# Patient Record
Sex: FEMALE | Race: WHITE | NOT HISPANIC OR LATINO | URBAN - METROPOLITAN AREA
[De-identification: names, ages, dates, MRNs, and addresses within clinical notes are randomized per-mention and may not be internally consistent; named-entity substitution may affect disease eponyms.]

---

## 2017-06-06 ENCOUNTER — INPATIENT (INPATIENT)
Facility: HOSPITAL | Age: 82
LOS: 1 days | Discharge: HOME | End: 2017-06-08
Attending: INTERNAL MEDICINE

## 2017-06-06 DIAGNOSIS — N39.0 URINARY TRACT INFECTION, SITE NOT SPECIFIED: ICD-10-CM

## 2017-06-28 DIAGNOSIS — N39.0 URINARY TRACT INFECTION, SITE NOT SPECIFIED: ICD-10-CM

## 2017-06-28 DIAGNOSIS — D50.9 IRON DEFICIENCY ANEMIA, UNSPECIFIED: ICD-10-CM

## 2017-06-28 DIAGNOSIS — C85.87 OTHER SPECIFIED TYPES OF NON-HODGKIN LYMPHOMA, SPLEEN: ICD-10-CM

## 2017-06-28 DIAGNOSIS — E86.0 DEHYDRATION: ICD-10-CM

## 2017-06-28 DIAGNOSIS — D70.3 NEUTROPENIA DUE TO INFECTION: ICD-10-CM

## 2019-07-03 ENCOUNTER — RECORD ABSTRACTING (OUTPATIENT)
Age: 84
End: 2019-07-03

## 2019-07-03 DIAGNOSIS — K63.5 POLYP OF COLON: ICD-10-CM

## 2019-07-03 DIAGNOSIS — Z80.3 FAMILY HISTORY OF MALIGNANT NEOPLASM OF BREAST: ICD-10-CM

## 2019-07-03 DIAGNOSIS — Z78.9 OTHER SPECIFIED HEALTH STATUS: ICD-10-CM

## 2019-07-03 DIAGNOSIS — Z86.2 PERSONAL HISTORY OF DISEASES OF THE BLOOD AND BLOOD-FORMING ORGANS AND CERTAIN DISORDERS INVOLVING THE IMMUNE MECHANISM: ICD-10-CM

## 2019-07-03 DIAGNOSIS — Z86.69 PERSONAL HISTORY OF OTHER DISEASES OF THE NERVOUS SYSTEM AND SENSE ORGANS: ICD-10-CM

## 2019-07-03 DIAGNOSIS — M25.473 EFFUSION, UNSPECIFIED ANKLE: ICD-10-CM

## 2019-07-03 DIAGNOSIS — N32.81 OVERACTIVE BLADDER: ICD-10-CM

## 2019-07-03 PROBLEM — Z00.00 ENCOUNTER FOR PREVENTIVE HEALTH EXAMINATION: Status: ACTIVE | Noted: 2019-07-03

## 2019-07-03 LAB — CYTOLOGY CVX/VAG DOC THIN PREP: NORMAL

## 2019-07-03 RX ORDER — DICYCLOMINE HYDROCHLORIDE 20 MG/1
TABLET ORAL
Refills: 0 | Status: ACTIVE | COMMUNITY

## 2019-07-03 RX ORDER — PREGABALIN 50 MG/1
50 CAPSULE ORAL
Refills: 0 | Status: ACTIVE | COMMUNITY

## 2019-07-08 ENCOUNTER — APPOINTMENT (OUTPATIENT)
Dept: OBGYN | Facility: CLINIC | Age: 84
End: 2019-07-08
Payer: MEDICARE

## 2019-07-08 VITALS
WEIGHT: 168 LBS | BODY MASS INDEX: 30.91 KG/M2 | HEIGHT: 62 IN | DIASTOLIC BLOOD PRESSURE: 74 MMHG | SYSTOLIC BLOOD PRESSURE: 128 MMHG

## 2019-07-08 DIAGNOSIS — N95.2 POSTMENOPAUSAL ATROPHIC VAGINITIS: ICD-10-CM

## 2019-07-08 DIAGNOSIS — N81.4 UTEROVAGINAL PROLAPSE, UNSPECIFIED: ICD-10-CM

## 2019-07-08 DIAGNOSIS — N39.3 STRESS INCONTINENCE (FEMALE) (MALE): ICD-10-CM

## 2019-07-08 PROCEDURE — 99214 OFFICE O/P EST MOD 30 MIN: CPT

## 2019-07-08 NOTE — PHYSICAL EXAM
[Awake] : awake [Alert] : alert [Soft] : soft [Oriented x3] : oriented to person, place, and time [Normal] : urethra [No Bleeding] : there was no active vaginal bleeding [Acute Distress] : no acute distress [Mass] : no breast mass [Nipple Discharge] : no nipple discharge [Axillary LAD] : no axillary lymphadenopathy [Tender] : non tender [Atrophy] : atrophy [Cystocele] : a cystocele [Absent] : was absent [Adnexa Absent] : absent bilaterally

## 2020-01-01 ENCOUNTER — EMERGENCY (EMERGENCY)
Facility: HOSPITAL | Age: 85
LOS: 0 days | Discharge: HOME | End: 2020-09-19
Attending: EMERGENCY MEDICINE | Admitting: EMERGENCY MEDICINE
Payer: MEDICARE

## 2020-01-01 VITALS
DIASTOLIC BLOOD PRESSURE: 84 MMHG | RESPIRATION RATE: 18 BRPM | SYSTOLIC BLOOD PRESSURE: 201 MMHG | HEIGHT: 63 IN | TEMPERATURE: 98 F | HEART RATE: 68 BPM | OXYGEN SATURATION: 98 %

## 2020-01-01 VITALS — HEART RATE: 94 BPM | DIASTOLIC BLOOD PRESSURE: 81 MMHG | SYSTOLIC BLOOD PRESSURE: 174 MMHG

## 2020-01-01 DIAGNOSIS — Z96.659 PRESENCE OF UNSPECIFIED ARTIFICIAL KNEE JOINT: Chronic | ICD-10-CM

## 2020-01-01 DIAGNOSIS — M79.89 OTHER SPECIFIED SOFT TISSUE DISORDERS: ICD-10-CM

## 2020-01-01 DIAGNOSIS — R60.0 LOCALIZED EDEMA: ICD-10-CM

## 2020-01-01 DIAGNOSIS — Z91.041 RADIOGRAPHIC DYE ALLERGY STATUS: ICD-10-CM

## 2020-01-01 DIAGNOSIS — Z88.0 ALLERGY STATUS TO PENICILLIN: ICD-10-CM

## 2020-01-01 LAB
ALBUMIN SERPL ELPH-MCNC: 3.8 G/DL — SIGNIFICANT CHANGE UP (ref 3.5–5.2)
ALP SERPL-CCNC: 61 U/L — SIGNIFICANT CHANGE UP (ref 30–115)
ALT FLD-CCNC: 36 U/L — SIGNIFICANT CHANGE UP (ref 0–41)
ANION GAP SERPL CALC-SCNC: 14 MMOL/L — SIGNIFICANT CHANGE UP (ref 7–14)
APPEARANCE UR: CLEAR — SIGNIFICANT CHANGE UP
AST SERPL-CCNC: 50 U/L — HIGH (ref 0–41)
BILIRUB SERPL-MCNC: 0.4 MG/DL — SIGNIFICANT CHANGE UP (ref 0.2–1.2)
BILIRUB UR-MCNC: NEGATIVE — SIGNIFICANT CHANGE UP
BUN SERPL-MCNC: 26 MG/DL — HIGH (ref 10–20)
CALCIUM SERPL-MCNC: 9.7 MG/DL — SIGNIFICANT CHANGE UP (ref 8.5–10.1)
CHLORIDE SERPL-SCNC: 100 MMOL/L — SIGNIFICANT CHANGE UP (ref 98–110)
CO2 SERPL-SCNC: 21 MMOL/L — SIGNIFICANT CHANGE UP (ref 17–32)
COLOR SPEC: SIGNIFICANT CHANGE UP
CREAT SERPL-MCNC: 0.9 MG/DL — SIGNIFICANT CHANGE UP (ref 0.7–1.5)
DIFF PNL FLD: NEGATIVE — SIGNIFICANT CHANGE UP
GLUCOSE SERPL-MCNC: 159 MG/DL — HIGH (ref 70–99)
GLUCOSE UR QL: NEGATIVE — SIGNIFICANT CHANGE UP
HCT VFR BLD CALC: 30.4 % — LOW (ref 37–47)
HGB BLD-MCNC: 9.6 G/DL — LOW (ref 12–16)
KETONES UR-MCNC: NEGATIVE — SIGNIFICANT CHANGE UP
LEUKOCYTE ESTERASE UR-ACNC: NEGATIVE — SIGNIFICANT CHANGE UP
MCHC RBC-ENTMCNC: 27.4 PG — SIGNIFICANT CHANGE UP (ref 27–31)
MCHC RBC-ENTMCNC: 31.6 G/DL — LOW (ref 32–37)
MCV RBC AUTO: 86.6 FL — SIGNIFICANT CHANGE UP (ref 81–99)
NITRITE UR-MCNC: NEGATIVE — SIGNIFICANT CHANGE UP
NRBC # BLD: 0 /100 WBCS — SIGNIFICANT CHANGE UP (ref 0–0)
NT-PROBNP SERPL-SCNC: 850 PG/ML — HIGH (ref 0–300)
PH UR: 6 — SIGNIFICANT CHANGE UP (ref 5–8)
PLATELET # BLD AUTO: 120 K/UL — LOW (ref 130–400)
POTASSIUM SERPL-MCNC: 5.6 MMOL/L — HIGH (ref 3.5–5)
POTASSIUM SERPL-SCNC: 5.6 MMOL/L — HIGH (ref 3.5–5)
PROT SERPL-MCNC: 6.2 G/DL — SIGNIFICANT CHANGE UP (ref 6–8)
PROT UR-MCNC: NEGATIVE — SIGNIFICANT CHANGE UP
RBC # BLD: 3.51 M/UL — LOW (ref 4.2–5.4)
RBC # FLD: 17.7 % — HIGH (ref 11.5–14.5)
SODIUM SERPL-SCNC: 135 MMOL/L — SIGNIFICANT CHANGE UP (ref 135–146)
SP GR SPEC: 1.01 — SIGNIFICANT CHANGE UP (ref 1.01–1.03)
UROBILINOGEN FLD QL: SIGNIFICANT CHANGE UP
WBC # BLD: 5.58 K/UL — SIGNIFICANT CHANGE UP (ref 4.8–10.8)
WBC # FLD AUTO: 5.58 K/UL — SIGNIFICANT CHANGE UP (ref 4.8–10.8)

## 2020-01-01 PROCEDURE — 93970 EXTREMITY STUDY: CPT | Mod: 26

## 2020-01-01 PROCEDURE — 71046 X-RAY EXAM CHEST 2 VIEWS: CPT | Mod: 26

## 2020-01-01 PROCEDURE — 99284 EMERGENCY DEPT VISIT MOD MDM: CPT

## 2020-06-22 ENCOUNTER — INPATIENT (INPATIENT)
Facility: HOSPITAL | Age: 85
LOS: 3 days | Discharge: HOME | End: 2020-06-26
Attending: INTERNAL MEDICINE | Admitting: INTERNAL MEDICINE
Payer: MEDICARE

## 2020-06-22 VITALS
RESPIRATION RATE: 18 BRPM | TEMPERATURE: 100 F | DIASTOLIC BLOOD PRESSURE: 58 MMHG | SYSTOLIC BLOOD PRESSURE: 133 MMHG | HEART RATE: 95 BPM

## 2020-06-22 LAB
ALBUMIN SERPL ELPH-MCNC: 4.1 G/DL — SIGNIFICANT CHANGE UP (ref 3.5–5.2)
ALLERGY+IMMUNOLOGY DIAG STUDY NOTE: SIGNIFICANT CHANGE UP
ALP SERPL-CCNC: 61 U/L — SIGNIFICANT CHANGE UP (ref 30–115)
ALT FLD-CCNC: 21 U/L — SIGNIFICANT CHANGE UP (ref 0–41)
ANION GAP SERPL CALC-SCNC: 15 MMOL/L — HIGH (ref 7–14)
ANISOCYTOSIS BLD QL: SLIGHT — SIGNIFICANT CHANGE UP
APPEARANCE UR: CLEAR — SIGNIFICANT CHANGE UP
AST SERPL-CCNC: 27 U/L — SIGNIFICANT CHANGE UP (ref 0–41)
BACTERIA # UR AUTO: NEGATIVE — SIGNIFICANT CHANGE UP
BASE EXCESS BLDV CALC-SCNC: 1.2 MMOL/L — SIGNIFICANT CHANGE UP (ref -2–2)
BASOPHILS # BLD AUTO: 0 K/UL — SIGNIFICANT CHANGE UP (ref 0–0.2)
BASOPHILS NFR BLD AUTO: 0 % — SIGNIFICANT CHANGE UP (ref 0–1)
BILIRUB DIRECT SERPL-MCNC: <0.2 MG/DL — SIGNIFICANT CHANGE UP (ref 0–0.2)
BILIRUB INDIRECT FLD-MCNC: >0.2 MG/DL — SIGNIFICANT CHANGE UP (ref 0.2–1.2)
BILIRUB SERPL-MCNC: 0.4 MG/DL — SIGNIFICANT CHANGE UP (ref 0.2–1.2)
BILIRUB UR-MCNC: NEGATIVE — SIGNIFICANT CHANGE UP
BLD GP AB SCN SERPL QL: SIGNIFICANT CHANGE UP
BUN SERPL-MCNC: 21 MG/DL — HIGH (ref 10–20)
CA-I SERPL-SCNC: 1.24 MMOL/L — SIGNIFICANT CHANGE UP (ref 1.12–1.3)
CALCIUM SERPL-MCNC: 9.3 MG/DL — SIGNIFICANT CHANGE UP (ref 8.5–10.1)
CHLORIDE SERPL-SCNC: 99 MMOL/L — SIGNIFICANT CHANGE UP (ref 98–110)
CO2 SERPL-SCNC: 23 MMOL/L — SIGNIFICANT CHANGE UP (ref 17–32)
COLOR SPEC: YELLOW — SIGNIFICANT CHANGE UP
CREAT SERPL-MCNC: 1.2 MG/DL — SIGNIFICANT CHANGE UP (ref 0.7–1.5)
DIFF PNL FLD: NEGATIVE — SIGNIFICANT CHANGE UP
DIR ANTIGLOB POLYSPECIFIC INTERPRETATION: SIGNIFICANT CHANGE UP
EOSINOPHIL # BLD AUTO: 0 K/UL — SIGNIFICANT CHANGE UP (ref 0–0.7)
EOSINOPHIL NFR BLD AUTO: 0 % — SIGNIFICANT CHANGE UP (ref 0–8)
EPI CELLS # UR: 3 /HPF — SIGNIFICANT CHANGE UP (ref 0–5)
GAS PNL BLDV: 137 MMOL/L — SIGNIFICANT CHANGE UP (ref 136–145)
GAS PNL BLDV: SIGNIFICANT CHANGE UP
GIANT PLATELETS BLD QL SMEAR: PRESENT — SIGNIFICANT CHANGE UP
GLUCOSE SERPL-MCNC: 139 MG/DL — HIGH (ref 70–99)
GLUCOSE UR QL: NEGATIVE — SIGNIFICANT CHANGE UP
HCO3 BLDV-SCNC: 26 MMOL/L — SIGNIFICANT CHANGE UP (ref 22–29)
HCT VFR BLD CALC: 28.8 % — LOW (ref 37–47)
HCT VFR BLDA CALC: 29 % — LOW (ref 34–44)
HGB BLD CALC-MCNC: 9.5 G/DL — LOW (ref 14–18)
HGB BLD-MCNC: 9 G/DL — LOW (ref 12–16)
HYALINE CASTS # UR AUTO: 1 /LPF — SIGNIFICANT CHANGE UP (ref 0–7)
HYPOCHROMIA BLD QL: SLIGHT — SIGNIFICANT CHANGE UP
INR BLD: 1.04 RATIO — SIGNIFICANT CHANGE UP (ref 0.65–1.3)
KETONES UR-MCNC: NEGATIVE — SIGNIFICANT CHANGE UP
LACTATE BLDV-MCNC: 2.4 MMOL/L — HIGH (ref 0.5–1.6)
LACTATE SERPL-SCNC: 1.7 MMOL/L — SIGNIFICANT CHANGE UP (ref 0.7–2)
LEUKOCYTE ESTERASE UR-ACNC: ABNORMAL
LIDOCAIN IGE QN: 40 U/L — SIGNIFICANT CHANGE UP (ref 7–60)
LYMPHOCYTES # BLD AUTO: 0.18 K/UL — LOW (ref 1.2–3.4)
LYMPHOCYTES # BLD AUTO: 7 % — LOW (ref 20.5–51.1)
MANUAL SMEAR VERIFICATION: SIGNIFICANT CHANGE UP
MCHC RBC-ENTMCNC: 27 PG — SIGNIFICANT CHANGE UP (ref 27–31)
MCHC RBC-ENTMCNC: 31.3 G/DL — LOW (ref 32–37)
MCV RBC AUTO: 86.5 FL — SIGNIFICANT CHANGE UP (ref 81–99)
MICROCYTES BLD QL: SLIGHT — SIGNIFICANT CHANGE UP
MONOCYTES # BLD AUTO: 0.09 K/UL — LOW (ref 0.1–0.6)
MONOCYTES NFR BLD AUTO: 3.5 % — SIGNIFICANT CHANGE UP (ref 1.7–9.3)
NEUTROPHILS # BLD AUTO: 2.35 K/UL — SIGNIFICANT CHANGE UP (ref 1.4–6.5)
NEUTROPHILS NFR BLD AUTO: 79.1 % — HIGH (ref 42.2–75.2)
NEUTS BAND # BLD: 10.4 % — HIGH (ref 0–6)
NITRITE UR-MCNC: NEGATIVE — SIGNIFICANT CHANGE UP
PCO2 BLDV: 44 MMHG — SIGNIFICANT CHANGE UP (ref 41–51)
PH BLDV: 7.39 — SIGNIFICANT CHANGE UP (ref 7.26–7.43)
PH UR: 6 — SIGNIFICANT CHANGE UP (ref 5–8)
PLAT MORPH BLD: NORMAL — SIGNIFICANT CHANGE UP
PLATELET # BLD AUTO: 22 K/UL — LOW (ref 130–400)
PO2 BLDV: 26 MMHG — SIGNIFICANT CHANGE UP (ref 20–40)
POIKILOCYTOSIS BLD QL AUTO: SLIGHT — SIGNIFICANT CHANGE UP
POLYCHROMASIA BLD QL SMEAR: SLIGHT — SIGNIFICANT CHANGE UP
POTASSIUM BLDV-SCNC: 4.2 MMOL/L — SIGNIFICANT CHANGE UP (ref 3.3–5.6)
POTASSIUM SERPL-MCNC: 4.3 MMOL/L — SIGNIFICANT CHANGE UP (ref 3.5–5)
POTASSIUM SERPL-SCNC: 4.3 MMOL/L — SIGNIFICANT CHANGE UP (ref 3.5–5)
PROT SERPL-MCNC: 5.9 G/DL — LOW (ref 6–8)
PROT UR-MCNC: SIGNIFICANT CHANGE UP
PROTHROM AB SERPL-ACNC: 12 SEC — SIGNIFICANT CHANGE UP (ref 9.95–12.87)
RBC # BLD: 3.33 M/UL — LOW (ref 4.2–5.4)
RBC # FLD: 15.7 % — HIGH (ref 11.5–14.5)
RBC BLD AUTO: ABNORMAL
RBC CASTS # UR COMP ASSIST: 3 /HPF — SIGNIFICANT CHANGE UP (ref 0–4)
SAO2 % BLDV: 45 % — SIGNIFICANT CHANGE UP
SODIUM SERPL-SCNC: 137 MMOL/L — SIGNIFICANT CHANGE UP (ref 135–146)
SP GR SPEC: 1.02 — SIGNIFICANT CHANGE UP (ref 1.01–1.02)
UROBILINOGEN FLD QL: SIGNIFICANT CHANGE UP
WBC # BLD: 2.63 K/UL — LOW (ref 4.8–10.8)
WBC # FLD AUTO: 2.63 K/UL — LOW (ref 4.8–10.8)
WBC UR QL: 11 /HPF — HIGH (ref 0–5)

## 2020-06-22 PROCEDURE — 99285 EMERGENCY DEPT VISIT HI MDM: CPT | Mod: CS

## 2020-06-22 PROCEDURE — 71045 X-RAY EXAM CHEST 1 VIEW: CPT | Mod: 26

## 2020-06-22 RX ORDER — SODIUM CHLORIDE 9 MG/ML
2150 INJECTION INTRAMUSCULAR; INTRAVENOUS; SUBCUTANEOUS ONCE
Refills: 0 | Status: COMPLETED | OUTPATIENT
Start: 2020-06-22 | End: 2020-06-22

## 2020-06-22 RX ORDER — ACETAMINOPHEN 500 MG
650 TABLET ORAL ONCE
Refills: 0 | Status: COMPLETED | OUTPATIENT
Start: 2020-06-22 | End: 2020-06-22

## 2020-06-22 RX ORDER — MEROPENEM 1 G/30ML
1000 INJECTION INTRAVENOUS ONCE
Refills: 0 | Status: COMPLETED | OUTPATIENT
Start: 2020-06-22 | End: 2020-06-22

## 2020-06-22 RX ADMIN — MEROPENEM 100 MILLIGRAM(S): 1 INJECTION INTRAVENOUS at 20:49

## 2020-06-22 RX ADMIN — SODIUM CHLORIDE 1075 MILLILITER(S): 9 INJECTION INTRAMUSCULAR; INTRAVENOUS; SUBCUTANEOUS at 19:37

## 2020-06-22 NOTE — H&P ADULT - NSHPPHYSICALEXAM_GEN_ALL_CORE
PHYSICAL EXAM:  GENERAL: NAD, lying in bed comfortably  HEAD:  Atraumatic, Normocephalic  EYES: EOMI, PERRLA, conjunctiva and sclera clear  ENT: Moist mucous membranes  NECK: Supple, No JVD  CHEST/LUNG: Clear to auscultation bilaterally; No rales, rhonchi, wheezing, or rubs. Unlabored respirations  HEART: Regular rate and rhythm; No murmurs, rubs, or gallops  ABDOMEN: Bowel sounds present; Soft, Nontender, Nondistended. No hepatomegally  EXTREMITIES:  2+ Peripheral Pulses, brisk capillary refill. No clubbing, cyanosis, or edema  NERVOUS SYSTEM:  Alert & Oriented X3, speech clear. No deficits   MSK: FROM all 4 extremities, full and equal strength  SKIN: No rashes or lesions PHYSICAL EXAM:  GENERAL: NAD, lying in bed comfortably  HEAD:  Atraumatic, Normocephalic  EYES: EOMI, PERRLA, conjunctiva and sclera clear  ENT: Moist mucous membranes  NECK: Supple, No JVD  CHEST/LUNG: Clear to auscultation bilaterally; No rales, rhonchi, wheezing, or rubs. Unlabored respirations  HEART: Regular rate and rhythm; No murmurs, rubs, or gallops  ABDOMEN: Bowel sounds present; Soft, Nontender, Nondistended. No hepatomegally  EXTREMITIES:  2+ Peripheral Pulses, brisk capillary refill. No clubbing, cyanosis, or edema  NERVOUS SYSTEM:  Alert & Oriented X3, speech clear. No deficits   MSK: FROM all 4 extremities, full and equal strength  SKIN: diffuse upper and lower extremity bruising.

## 2020-06-22 NOTE — H&P ADULT - NSHPLABSRESULTS_GEN_ALL_CORE
9.0    2.63  )-----------( 22       ( 22 Jun 2020 18:26 )             28.8       06-22    137  |  99  |  21<H>  ----------------------------<  139<H>  4.3   |  23  |  1.2    Ca    9.3      22 Jun 2020 18:26    TPro  5.9<L>  /  Alb  4.1  /  TBili  0.4  /  DBili  <0.2  /  AST  27  /  ALT  21  /  AlkPhos  61  06-22

## 2020-06-22 NOTE — ED PROVIDER NOTE - ATTENDING CONTRIBUTION TO CARE
89 y/o female h/o lymphoma on active chemo, denies PSH, non-smoker, now presents with urinary frequency x week (on po bactrim x 1 day) and fever Tm (103.9, oral) today, sx are intermittent, denies modifying factors, associated nbnb vomiting and chills / rigors, denies headache, ear pain, sore throat, stuffy / runny nose, neck pain, cough, abdominal pain, diarrhea, anorexia, respiratory sx, change in bowel habits, rash or other associated complaints at present.    No old chart available for review.  I have reviewed and agree with the nursing note, except as documented in my note.    VSS, awake, alert, non-toxic appearing, lying comfortably in stretcher, in NAD, oropharynx clear, mmm, no JVD or bruit, no skin rash or lesions, chest CTAB, non-labored breathing, no w/r/r, +S1/S2, RRR, no m/r/g, abdomen soft, NT, ND, +BS, no peripheral edema or deformities, alert, clear speech.

## 2020-06-22 NOTE — H&P ADULT - ATTENDING COMMENTS
HPI:  89 year old female with a PMhx of diffuse large cell lymphoma, ITP, cervical disk inflammation.   Patient is on active chemotherapy for lymphoma. Her last session was one week prior at [facility] where she is getting Rituximab.   She has received 1 dose of rituximab for DLCL (1 week ago) and was scheduled for her second dose on 6/23.     CC: fever of 103 at home and chills    History goes back to: 1 day prior to admission when she started having chills and high grade fever.   She was at a barbecue with her relatives when she suddenly experienced these symptoms.   No exposure to COVID-19.      ROS is positive for: chronic urinary frequency    ROS is negative for: She reports no dysuria, no cough, no shortness of breath, no GI symptoms, no abdominal pain, no decrease PO intake.    Of note, patient had a UTI a few weeks ago and she completed a course of bactrim.     In the ED: Patient was hemodynamically stable, Ofdu=286.1  WBC=2, and plts=22  UA --> mildly positive   CXR: no signs of acute cardiopulmonary disease (unofficial reading)  Patient got 1 dose of meropenem in the ED. (22 Jun 2020 22:52)    REVIEW OF SYSTEMS: see cc/HPI  CONSTITUTIONAL: No weakness, fevers or chills  EYES/ENT: No visual changes;  No vertigo or throat pain   NECK: No pain or stiffness  RESPIRATORY: No cough, wheezing, hemoptysis; No shortness of breath  CARDIOVASCULAR: No chest pain or palpitations  GASTROINTESTINAL: No abdominal or epigastric pain. No nausea, vomiting, or hematemesis; No diarrhea or constipation. No melena or hematochezia.  GENITOURINARY: No dysuria, frequency or hematuria  NEUROLOGICAL: No numbness or weakness  SKIN: No itching, rashes    Physical Exam:  General: WN/WD NAD  Neurology: A&Ox3, nonfocal, follows commands  Eyes: PERRLA/ EOMI  ENT/Neck: Neck supple, trachea midline, No JVD  Respiratory: CTA B/L, No wheezing, rales, rhonchi  CV: Normal rate regular rhythm, S1S2, no murmurs, rubs or gallops  Abdominal: Soft, NT, ND +BS,   Extremities: No edema, + peripheral pulses  Skin: No Rashes, Hematoma, Ecchymosis  Incisions: n/a  Tubes: n/a    A/p  UTI- symptomatic / Fever and chills - PUI   -IV Abx   -check blood and Ucx   -Check COVID-19 PCR    DLCL / Pancytopenia - on active chemo   -monitor CBC and monitor for signs of bleeding   -c/w Acyclovir     KATHIA - stable  -IV fluids x 24 and repeat BMP  SCD to LE bilaterally for DVT prophylaxis

## 2020-06-22 NOTE — ED ADULT NURSE NOTE - CHIEF COMPLAINT QUOTE
pt. complaining of shakes and vomiting after taking medications. pt. had first chemo therapy last Tuesday due to lymphoma. pt. stated that her temperature was 103 F hour prior to arrival at ED.

## 2020-06-22 NOTE — H&P ADULT - NSHPREVIEWOFSYSTEMS_GEN_ALL_CORE
As in HPI REVIEW OF SYSTEMS:    CONSTITUTIONAL: FEVER and CHILLS  EYES/ENT: No visual changes;  No vertigo or throat pain   NECK: No pain or stiffness  RESPIRATORY: No cough, wheezing, hemoptysis; No shortness of breath  CARDIOVASCULAR: No chest pain or palpitations  GASTROINTESTINAL: No abdominal or epigastric pain. No nausea, vomiting, or hematemesis; No diarrhea or constipation. No melena or hematochezia.  GENITOURINARY: chronic urinary frequency.   NEUROLOGICAL: No numbness or weakness  SKIN: chronic easy bruising.

## 2020-06-22 NOTE — ED PROVIDER NOTE - CARE PLAN
Principal Discharge DX:	UTI (urinary tract infection)  Secondary Diagnosis:	Sepsis  Secondary Diagnosis:	Thrombocytopenia

## 2020-06-22 NOTE — H&P ADULT - ASSESSMENT
89 year old female    #      # Anemia and thrombocytopenia  Likely chemotherapy induced. Patient has been taking steroids for her thrombocytopenia 89 year old female with a PMhx of diffuse large cell lymphoma (currently on rituximab last session 1 week ago, next due on 6/23/2) , ITP, cervical disk inflammation.   Patient is presenting for high grade fevers and chills at home  In the ED, Rydi=945.1, hemodynamically stable and AAOx3  Patient is admitted for further evaluation of fever in the setting of immunosuppression     # Fever + chills at home (no fever documented yet in ED). No sepsis  Most likely UTI. UA is mildly positive, no dysuria  (however patient has chronic urinary frequency which could mask her symptoms) Completed a course of bactrim 2 weeks ago for UTI  Less likely covid (no symptoms and leukopenia could be due to chemotherapy)  CXR normal   - will start patient on ceftriaxone for uncomplicated UTI (aware of penicillin allergy - rash)  - follow up Ucx and Bcx  - f/u covid swab  - monitor for fevers    # Pancytopenia -  Pancytopenia likely from rituximab   # Thrombocytopenia, likely multifactorial in the setting of Hx of ITP -  Patient unsure when the last time her plts were normal.   Possible element of bactrim induced thrombocytopenia  She had previously completed a course of steroids from her PCP (Dr. Aj) for possible ITP  - no active signs of bleeding. Monitor for bleeding  - hold DVT prophylaxis  - outpatient follow up     # DLCL - on rituximab  2nd dose was due on 6/23/20  - continue acylovir prophylaxis  - continue folic acid    #Misc  - DVT Prophylaxis: SCDs in the setting of thrombocytopenia,  - Diet: regular  - Activity: as tolerated  - Code Status: Full Code    SOCIAL: patient lives alone, walks with a cane, comfortable caring out self care.    * MED REC COMPLETED WITH PATIENT*    Oncologist: Dr. Michael Hernandez - [facility]  PCP: Dr. Lui Aj. 89 year old female with a PMhx of diffuse large cell lymphoma (currently on rituximab last session 1 week ago, next due on 6/23/2) , ITP, cervical disk inflammation.   Patient is presenting for high grade fevers and chills at home  In the ED, Hwrs=941.1, hemodynamically stable and AAOx3  Patient is admitted for further evaluation and management of fever in the setting of immunosuppression     # Fever + chills at home (no fever documented yet in ED) in an immunocompromised patient. No sepsis  Most likely UTI. UA is mildly positive, no dysuria  (however patient has chronic urinary frequency which could mask her symptoms). Additionally, patient completed a course of bactrim 2 weeks ago for UTI.  Less likely covid PNA (no symptoms, no suggestive CXR findings and leukopenia could be due to Rituximab)  CXR normal   - will start patient on ceftriaxone for uncomplicated UTI (aware of penicillin allergy - rash)  - follow up Ucx and Bcx  - f/u covid swab  - monitor for fevers    # Pancytopenia -  Pancytopenia likely from rituximab   # Thrombocytopenia, likely multifactorial in the setting of Hx of ITP, rituximab -  Patient unsure when the last time her plts were normal.   Possible element of bactrim induced thrombocytopenia  She had previously completed a course of steroids from her PCP (Dr. Aj) for possible ITP  - no active signs of bleeding. Monitor for bleeding  - hold DVT prophylaxis  - outpatient follow up     # Possible KATHIA? Cr=1.2  No baseline, but no known hx of kidney disease  s/p 1L LR in ED  - encourage PO intake  - trend cr    # DLCL - on rituximab  2nd dose was due on 6/23/20  - continue acyclovir prophylaxis for VZV/HSV in the setting of immunosuppression  - continue folic acid    #Misc  - DVT Prophylaxis: SCDs in the setting of thrombocytopenia,  - Diet: regular  - Activity: as tolerated  - Code Status: Full Code    SOCIAL: patient lives alone, walks with a cane, comfortable with daily personal care.   * MED REC COMPLETED WITH PATIENT*    Oncologist: Dr. Michael Hernandez - [facility]  PCP: Dr. Lui Aj. 89 year old female with a PMhx of diffuse large cell lymphoma (currently on rituximab last session 1 week ago, next due on 6/23/2) , ITP, cervical disk inflammation.   Patient is presenting for high grade fevers and chills at home  In the ED, Svnw=143.1, hemodynamically stable and AAOx3  Patient is admitted for further evaluation and management of fever in the setting of immunosuppression     # Fever + chills at home (no fever documented yet in ED) in an immunocompromised patient. No sepsis  Most likely UTI. UA is mildly positive, no dysuria  (however patient has chronic urinary frequency which could mask her symptoms). Additionally, patient completed a course of bactrim 2 weeks ago for UTI.  Less likely covid PNA/other viral URTI (no symptoms, no suggestive CXR findings and leukopenia could be due to Rituximab)  CXR normal   - will start patient on ceftriaxone for uncomplicated UTI (aware of penicillin allergy - rash)  - follow up Ucx and Bcx  - f/u covid swab  - monitor for fevers    # Pancytopenia -  Pancytopenia likely from rituximab   # Thrombocytopenia, likely multifactorial in the setting of Hx of ITP, rituximab -  Patient unsure when the last time her plts were normal.   Possible element of bactrim induced thrombocytopenia  She had previously completed a course of steroids from her PCP (Dr. Aj) for possible ITP  - no active signs of bleeding. Monitor for bleeding  - hold DVT prophylaxis  - outpatient follow up     # Possible KATHIA? Cr=1.2  No baseline, but no known hx of kidney disease  s/p 1L LR in ED  - encourage PO intake  - trend cr    # DLCL - on rituximab  2nd dose was due on 6/23/20  - continue acyclovir prophylaxis for VZV/HSV in the setting of immunosuppression  - continue folic acid    #Misc  - DVT Prophylaxis: SCDs in the setting of thrombocytopenia,  - Diet: regular  - Activity: as tolerated  - Code Status: Full Code    SOCIAL: patient lives alone, walks with a cane, comfortable with daily personal care.   * MED REC COMPLETED WITH PATIENT*    Oncologist: Dr. Michael Hernandez - [facility]  PCP: Dr. Lui Aj.

## 2020-06-22 NOTE — ED PROVIDER NOTE - NS ED ROS FT
CONST: No fever, chills or bodyaches  EYES: No pain, redness, drainage or visual changes.  ENT: No ear pain or discharge, nasal discharge or congestion. No sore throat  CARD: No chest pain, palpitations  RESP: No SOB, cough  GI: No abdominal pain, N/V/D  : Urinary frequency, no dysuria.  MS: No joint pain, back pain or extremity pain/injury  SKIN: No rashes  NEURO: No headache, dizziness, paresthesias or LOC

## 2020-06-22 NOTE — ED PROVIDER NOTE - OBJECTIVE STATEMENT
89yo female with PMHx overactive bladder, lymphoma on active chemo, with last session one week prior at [facility] and due for today, however canceled 2/2 low platelets. Was started on prednisone by her PMD in consultation with her heme/onc.  Pt reports she has had urinary frequency for one week and was recently started on Bactrim by her oncologist of which she took one dose today and shortly after vomited. Pt notes rigors and tmax 103 at home, responsive to tylenol. Denies abdominal or flank pain. No SOB,CP. Pt has been in close contact with multiple visiting family members and has traveled to MD appts in [facility] and [facility].

## 2020-06-22 NOTE — H&P ADULT - HISTORY OF PRESENT ILLNESS
89 year old female with a PMhx of lymphoma, overactive bladder.   Patient is on active chemotherapy for lymphoma. Her last session was one week prior at [facility]     CC:    History goes back to:       Of note      ROS is positive for:     ROS is negative for:     In the ED: Patient was hemodynamically stable, Odbs=298.1  UA --> mildly positive   CXR: no signs of acute cardiopulmonary disease unofficial reading) 89 year old female with a PMhx of diffuse large cell lymphoma, ITP, cervical disk inflammation.   Patient is on active chemotherapy for lymphoma. Her last session was one week prior at [facility] where she is getting Rituximab.   She was received 1 dose of rituximab for DLCL (1 week ago) and was scheduled for her second dose on 6/23.     CC: fever of 103 at home and chills    History goes back to: 1 day prior to admission when she started having chills and high grade fever.   She was at a barbecue with her relatives when she suddenly experienced these symptoms.   No exposure to COVID-19.      ROS is positive for: chronic urinary frequency    ROS is negative for: She reports no dysuria, no cough, no shortness of breath, no GI symptoms, no abdominal pain, no decrease PO intake.    Of note, patient had a UTI a few weeks ago and she completed a course of bactrim.     In the ED: Patient was hemodynamically stable, Hhux=873.1  WBC=2, and plts=22  UA --> mildly positive   CXR: no signs of acute cardiopulmonary disease (unofficial reading)  Patient got 1 dose of meropenem in the ED. 89 year old female with a PMhx of diffuse large cell lymphoma, ITP, cervical disk inflammation.   Patient is on active chemotherapy for lymphoma. Her last session was one week prior at [facility] where she is getting Rituximab.   She has received 1 dose of rituximab for DLCL (1 week ago) and was scheduled for her second dose on 6/23.     CC: fever of 103 at home and chills    History goes back to: 1 day prior to admission when she started having chills and high grade fever.   She was at a barbecue with her relatives when she suddenly experienced these symptoms.   No exposure to COVID-19.      ROS is positive for: chronic urinary frequency    ROS is negative for: She reports no dysuria, no cough, no shortness of breath, no GI symptoms, no abdominal pain, no decrease PO intake.    Of note, patient had a UTI a few weeks ago and she completed a course of bactrim.     In the ED: Patient was hemodynamically stable, Lqfm=677.1  WBC=2, and plts=22  UA --> mildly positive   CXR: no signs of acute cardiopulmonary disease (unofficial reading)  Patient got 1 dose of meropenem in the ED.

## 2020-06-22 NOTE — ED PROVIDER NOTE - PHYSICAL EXAMINATION
CONST: Well appearing in NAD  EYES: PERRL, EOMI, Sclera and conjunctiva clear.   ENT: No nasal discharge.   NECK: Non-tender  CARD: Normal S1 S2; Normal rate and rhythm  RESP: Equal BS B/L, No wheezes, rhonchi or rales. No distress  GI: Soft, non-tender, non-distended.  MS: Normal ROM in all extremities. No midline spinal tenderness.  SKIN: Warm, dry, no acute rashes. Good turgor. Scattered ecchymosis to UEs  NEURO: A&Ox3, No focal deficits. Strength 5/5 with no sensory deficits. Steady gait

## 2020-06-22 NOTE — ED ADULT NURSE NOTE - OBJECTIVE STATEMENT
pt. complaining of shakes and fever after taking medications. pt. had first chemotherapy last Tuesday. pt. complaining of shakes and fever after taking medications. pt. had an episode of bloody stool and ,UTI symptom. pt. had first chemotherapy last Tuesday.

## 2020-06-23 DIAGNOSIS — Z96.659 PRESENCE OF UNSPECIFIED ARTIFICIAL KNEE JOINT: Chronic | ICD-10-CM

## 2020-06-23 LAB
ALBUMIN SERPL ELPH-MCNC: 3.5 G/DL — SIGNIFICANT CHANGE UP (ref 3.5–5.2)
ALP SERPL-CCNC: 54 U/L — SIGNIFICANT CHANGE UP (ref 30–115)
ALT FLD-CCNC: 18 U/L — SIGNIFICANT CHANGE UP (ref 0–41)
ANION GAP SERPL CALC-SCNC: 12 MMOL/L — SIGNIFICANT CHANGE UP (ref 7–14)
APTT BLD: SIGNIFICANT CHANGE UP SEC (ref 27–39.2)
AST SERPL-CCNC: 26 U/L — SIGNIFICANT CHANGE UP (ref 0–41)
BASOPHILS # BLD AUTO: 0 K/UL — SIGNIFICANT CHANGE UP (ref 0–0.2)
BASOPHILS NFR BLD AUTO: 0 % — SIGNIFICANT CHANGE UP (ref 0–1)
BILIRUB SERPL-MCNC: 0.4 MG/DL — SIGNIFICANT CHANGE UP (ref 0.2–1.2)
BUN SERPL-MCNC: 17 MG/DL — SIGNIFICANT CHANGE UP (ref 10–20)
CALCIUM SERPL-MCNC: 8.3 MG/DL — LOW (ref 8.5–10.1)
CHLORIDE SERPL-SCNC: 105 MMOL/L — SIGNIFICANT CHANGE UP (ref 98–110)
CO2 SERPL-SCNC: 23 MMOL/L — SIGNIFICANT CHANGE UP (ref 17–32)
CREAT SERPL-MCNC: 1 MG/DL — SIGNIFICANT CHANGE UP (ref 0.7–1.5)
CULTURE RESULTS: SIGNIFICANT CHANGE UP
EOSINOPHIL # BLD AUTO: 0.04 K/UL — SIGNIFICANT CHANGE UP (ref 0–0.7)
EOSINOPHIL NFR BLD AUTO: 2.1 % — SIGNIFICANT CHANGE UP (ref 0–8)
GLUCOSE SERPL-MCNC: 98 MG/DL — SIGNIFICANT CHANGE UP (ref 70–99)
HCT VFR BLD CALC: 26.9 % — LOW (ref 37–47)
HGB BLD-MCNC: 8.8 G/DL — LOW (ref 12–16)
IMM GRANULOCYTES NFR BLD AUTO: 7.8 % — HIGH (ref 0.1–0.3)
INR BLD: 0.76 RATIO — SIGNIFICANT CHANGE UP (ref 0.65–1.3)
LYMPHOCYTES # BLD AUTO: 0.6 K/UL — LOW (ref 1.2–3.4)
LYMPHOCYTES # BLD AUTO: 31.3 % — SIGNIFICANT CHANGE UP (ref 20.5–51.1)
MCHC RBC-ENTMCNC: 28.9 PG — SIGNIFICANT CHANGE UP (ref 27–31)
MCHC RBC-ENTMCNC: 32.7 G/DL — SIGNIFICANT CHANGE UP (ref 32–37)
MCV RBC AUTO: 88.5 FL — SIGNIFICANT CHANGE UP (ref 81–99)
MONOCYTES # BLD AUTO: 0.2 K/UL — SIGNIFICANT CHANGE UP (ref 0.1–0.6)
MONOCYTES NFR BLD AUTO: 10.4 % — HIGH (ref 1.7–9.3)
NEUTROPHILS # BLD AUTO: 0.93 K/UL — LOW (ref 1.4–6.5)
NEUTROPHILS NFR BLD AUTO: 48.4 % — SIGNIFICANT CHANGE UP (ref 42.2–75.2)
NRBC # BLD: 0 /100 WBCS — SIGNIFICANT CHANGE UP (ref 0–0)
PLATELET # BLD AUTO: 17 K/UL — CRITICAL LOW (ref 130–400)
POTASSIUM SERPL-MCNC: 4.3 MMOL/L — SIGNIFICANT CHANGE UP (ref 3.5–5)
POTASSIUM SERPL-SCNC: 4.3 MMOL/L — SIGNIFICANT CHANGE UP (ref 3.5–5)
PROT SERPL-MCNC: 5.1 G/DL — LOW (ref 6–8)
PROTHROM AB SERPL-ACNC: 8.7 SEC — LOW (ref 9.95–12.87)
RBC # BLD: 3.04 M/UL — LOW (ref 4.2–5.4)
RBC # FLD: 15.9 % — HIGH (ref 11.5–14.5)
SARS-COV-2 RNA SPEC QL NAA+PROBE: SIGNIFICANT CHANGE UP
SODIUM SERPL-SCNC: 140 MMOL/L — SIGNIFICANT CHANGE UP (ref 135–146)
SPECIMEN SOURCE: SIGNIFICANT CHANGE UP
WBC # BLD: 1.92 K/UL — LOW (ref 4.8–10.8)
WBC # FLD AUTO: 1.92 K/UL — LOW (ref 4.8–10.8)

## 2020-06-23 PROCEDURE — 86077 PHYS BLOOD BANK SERV XMATCH: CPT

## 2020-06-23 PROCEDURE — 99223 1ST HOSP IP/OBS HIGH 75: CPT

## 2020-06-23 RX ORDER — ACETAMINOPHEN 500 MG
650 TABLET ORAL ONCE
Refills: 0 | Status: COMPLETED | OUTPATIENT
Start: 2020-06-23 | End: 2020-06-23

## 2020-06-23 RX ORDER — FOLIC ACID 0.8 MG
1 TABLET ORAL DAILY
Refills: 0 | Status: DISCONTINUED | OUTPATIENT
Start: 2020-06-23 | End: 2020-06-26

## 2020-06-23 RX ORDER — LATANOPROST 0.05 MG/ML
1 SOLUTION/ DROPS OPHTHALMIC; TOPICAL AT BEDTIME
Refills: 0 | Status: DISCONTINUED | OUTPATIENT
Start: 2020-06-23 | End: 2020-06-26

## 2020-06-23 RX ORDER — CEFEPIME 1 G/1
2000 INJECTION, POWDER, FOR SOLUTION INTRAMUSCULAR; INTRAVENOUS EVERY 12 HOURS
Refills: 0 | Status: DISCONTINUED | OUTPATIENT
Start: 2020-06-23 | End: 2020-06-24

## 2020-06-23 RX ORDER — CEFTRIAXONE 500 MG/1
1000 INJECTION, POWDER, FOR SOLUTION INTRAMUSCULAR; INTRAVENOUS EVERY 24 HOURS
Refills: 0 | Status: DISCONTINUED | OUTPATIENT
Start: 2020-06-23 | End: 2020-06-23

## 2020-06-23 RX ORDER — ACYCLOVIR SODIUM 500 MG
400 VIAL (EA) INTRAVENOUS
Refills: 0 | Status: DISCONTINUED | OUTPATIENT
Start: 2020-06-23 | End: 2020-06-26

## 2020-06-23 RX ORDER — CHOLECALCIFEROL (VITAMIN D3) 125 MCG
1000 CAPSULE ORAL DAILY
Refills: 0 | Status: DISCONTINUED | OUTPATIENT
Start: 2020-06-23 | End: 2020-06-26

## 2020-06-23 RX ORDER — ACETAMINOPHEN 500 MG
650 TABLET ORAL EVERY 6 HOURS
Refills: 0 | Status: DISCONTINUED | OUTPATIENT
Start: 2020-06-23 | End: 2020-06-26

## 2020-06-23 RX ADMIN — Medication 650 MILLIGRAM(S): at 16:04

## 2020-06-23 RX ADMIN — Medication 1 MILLIGRAM(S): at 12:07

## 2020-06-23 RX ADMIN — Medication 1000 UNIT(S): at 12:07

## 2020-06-23 RX ADMIN — Medication 400 MILLIGRAM(S): at 18:50

## 2020-06-23 RX ADMIN — LATANOPROST 1 DROP(S): 0.05 SOLUTION/ DROPS OPHTHALMIC; TOPICAL at 21:52

## 2020-06-23 RX ADMIN — Medication 650 MILLIGRAM(S): at 00:06

## 2020-06-23 RX ADMIN — CEFTRIAXONE 100 MILLIGRAM(S): 500 INJECTION, POWDER, FOR SOLUTION INTRAMUSCULAR; INTRAVENOUS at 01:19

## 2020-06-23 RX ADMIN — Medication 400 MILLIGRAM(S): at 06:19

## 2020-06-23 RX ADMIN — Medication 650 MILLIGRAM(S): at 09:20

## 2020-06-23 RX ADMIN — CEFEPIME 100 MILLIGRAM(S): 1 INJECTION, POWDER, FOR SOLUTION INTRAMUSCULAR; INTRAVENOUS at 18:51

## 2020-06-23 NOTE — ED ADULT NURSE REASSESSMENT NOTE - NS ED NURSE REASSESS COMMENT FT1
patient admitted to medicine and moved to ED3 bed 4. Patient in stable condition and nad. Patient offers no complaints. Report given to BASHIR Ba.

## 2020-06-23 NOTE — PHARMACOTHERAPY INTERVENTION NOTE - COMMENTS
s/w dr frankel- aware of pt allergy & wishes to continue ceftriaxone- also is changing IVP to regular IV

## 2020-06-23 NOTE — PROGRESS NOTE ADULT - ASSESSMENT
89 year old female with a PMhx of diffuse large cell lymphoma (currently on rituximab last session 1 week ago, next due on 6/23/2) , ITP, cervical disk inflammation.   Patient is presenting for high grade fevers and chills at home  In the ED, Ddfz=863.1, hemodynamically stable and AAOx3  Patient is admitted for further evaluation and management of fever in the setting of immunosuppression     # Fever + chills at home (no fever documented yet in ED) in an immunocompromised patient. No sepsis  -Possible UTI. UA is not convincing , no dysuria  (however patient has chronic urinary frequency which could mask her symptoms). Additionally, patient completed a course of bactrim 2 weeks ago for UTI.  -Less likely covid PNA/other viral URTI (no symptoms, no suggestive CXR findings and leukopenia could be due to Rituximab)  -CXR normal   - will start patient on cefepime 2g IV q 12 hours until 48 hours afebrile.  for neutropenic fever with possibility of UTI   - follow up Ucx and Bcx  - f/u covid swab  - monitor for fevers    # Pancytopenia -  Pancytopenia likely from rituximab   # Thrombocytopenia, likely multifactorial in the setting of Hx of ITP, rituximab -  Patient unsure when the last time her plts were normal.   Possible element of bactrim induced thrombocytopenia  She had previously completed a course of steroids from her PCP (Dr. Aj) for possible ITP  - no active signs of bleeding. Monitor for bleeding  - hold DVT prophylaxis  - outpatient follow up     # Possible KATHIA? Cr=1.2  -No baseline, but no known hx of kidney disease  -s/p 1L LR in ED  - encourage PO intake  - trend cr    # DLCL - on rituximab  2nd dose was due on 6/23/20  - continue acyclovir prophylaxis for VZV/HSV in the setting of immunosuppression  - continue folic acid    #Misc  - DVT Prophylaxis: SCDs in the setting of thrombocytopenia,  - Diet: regular  - Activity: as tolerated  - Code Status: Full Code    SOCIAL: patient lives alone, walks with a cane, comfortable with daily personal care.   * MED REC COMPLETED WITH PATIENT*    Oncologist: Dr. Michael Hernandez - [facility]  PCP: Dr. Lui Aj.

## 2020-06-23 NOTE — CHART NOTE - NSCHARTNOTEFT_GEN_A_CORE
Pt seen by nocturnist in am.   Pt seen and examined at bedside. vitals and exam stable. Chart reviewed. Discussed plan with resident.   neurtopenic fever 2/2 UTI- follow up ID, continue cefipeme, follow up Urine culture, pt stable, follow up blood cultures.

## 2020-06-24 LAB
ALBUMIN SERPL ELPH-MCNC: 3.5 G/DL — SIGNIFICANT CHANGE UP (ref 3.5–5.2)
ALP SERPL-CCNC: 85 U/L — SIGNIFICANT CHANGE UP (ref 30–115)
ALT FLD-CCNC: 28 U/L — SIGNIFICANT CHANGE UP (ref 0–41)
ANION GAP SERPL CALC-SCNC: 14 MMOL/L — SIGNIFICANT CHANGE UP (ref 7–14)
AST SERPL-CCNC: 43 U/L — HIGH (ref 0–41)
BASOPHILS # BLD AUTO: 0.01 K/UL — SIGNIFICANT CHANGE UP (ref 0–0.2)
BASOPHILS NFR BLD AUTO: 0.5 % — SIGNIFICANT CHANGE UP (ref 0–1)
BILIRUB SERPL-MCNC: 0.6 MG/DL — SIGNIFICANT CHANGE UP (ref 0.2–1.2)
BUN SERPL-MCNC: 17 MG/DL — SIGNIFICANT CHANGE UP (ref 10–20)
CALCIUM SERPL-MCNC: 8.6 MG/DL — SIGNIFICANT CHANGE UP (ref 8.5–10.1)
CHLORIDE SERPL-SCNC: 103 MMOL/L — SIGNIFICANT CHANGE UP (ref 98–110)
CO2 SERPL-SCNC: 21 MMOL/L — SIGNIFICANT CHANGE UP (ref 17–32)
CREAT SERPL-MCNC: 1.1 MG/DL — SIGNIFICANT CHANGE UP (ref 0.7–1.5)
DIR ANTIGLOB POLYSPECIFIC INTERPRETATION: SIGNIFICANT CHANGE UP
EOSINOPHIL # BLD AUTO: 0.03 K/UL — SIGNIFICANT CHANGE UP (ref 0–0.7)
EOSINOPHIL NFR BLD AUTO: 1.4 % — SIGNIFICANT CHANGE UP (ref 0–8)
GLUCOSE SERPL-MCNC: 107 MG/DL — HIGH (ref 70–99)
HCT VFR BLD CALC: 26.7 % — LOW (ref 37–47)
HCT VFR BLD CALC: 29.6 % — LOW (ref 37–47)
HGB BLD-MCNC: 8.6 G/DL — LOW (ref 12–16)
HGB BLD-MCNC: 9.2 G/DL — LOW (ref 12–16)
IMM GRANULOCYTES NFR BLD AUTO: 7.9 % — HIGH (ref 0.1–0.3)
LDH SERPL L TO P-CCNC: 334 — HIGH (ref 50–242)
LDH SERPL L TO P-CCNC: 358 — HIGH (ref 50–242)
LYMPHOCYTES # BLD AUTO: 1.03 K/UL — LOW (ref 1.2–3.4)
LYMPHOCYTES # BLD AUTO: 47.7 % — SIGNIFICANT CHANGE UP (ref 20.5–51.1)
MCHC RBC-ENTMCNC: 27.1 PG — SIGNIFICANT CHANGE UP (ref 27–31)
MCHC RBC-ENTMCNC: 27.2 PG — SIGNIFICANT CHANGE UP (ref 27–31)
MCHC RBC-ENTMCNC: 31.1 G/DL — LOW (ref 32–37)
MCHC RBC-ENTMCNC: 32.2 G/DL — SIGNIFICANT CHANGE UP (ref 32–37)
MCV RBC AUTO: 84.5 FL — SIGNIFICANT CHANGE UP (ref 81–99)
MCV RBC AUTO: 87.3 FL — SIGNIFICANT CHANGE UP (ref 81–99)
MONOCYTES # BLD AUTO: 0.14 K/UL — SIGNIFICANT CHANGE UP (ref 0.1–0.6)
MONOCYTES NFR BLD AUTO: 6.5 % — SIGNIFICANT CHANGE UP (ref 1.7–9.3)
NEUTROPHILS # BLD AUTO: 0.78 K/UL — LOW (ref 1.4–6.5)
NEUTROPHILS NFR BLD AUTO: 36 % — LOW (ref 42.2–75.2)
NRBC # BLD: 0 /100 WBCS — SIGNIFICANT CHANGE UP (ref 0–0)
NRBC # BLD: 0 /100 WBCS — SIGNIFICANT CHANGE UP (ref 0–0)
PLATELET # BLD AUTO: 15 K/UL — CRITICAL LOW (ref 130–400)
PLATELET # BLD AUTO: 15 K/UL — CRITICAL LOW (ref 130–400)
POTASSIUM SERPL-MCNC: 4.4 MMOL/L — SIGNIFICANT CHANGE UP (ref 3.5–5)
POTASSIUM SERPL-SCNC: 4.4 MMOL/L — SIGNIFICANT CHANGE UP (ref 3.5–5)
PROT SERPL-MCNC: 5.6 G/DL — LOW (ref 6–8)
RBC # BLD: 3.16 M/UL — LOW (ref 4.2–5.4)
RBC # BLD: 3.16 M/UL — LOW (ref 4.2–5.4)
RBC # BLD: 3.39 M/UL — LOW (ref 4.2–5.4)
RBC # FLD: 15.9 % — HIGH (ref 11.5–14.5)
RBC # FLD: 16 % — HIGH (ref 11.5–14.5)
RETICS #: 47.7 K/UL — SIGNIFICANT CHANGE UP (ref 25–125)
RETICS/RBC NFR: 1.5 % — SIGNIFICANT CHANGE UP (ref 0.5–1.5)
SODIUM SERPL-SCNC: 138 MMOL/L — SIGNIFICANT CHANGE UP (ref 135–146)
URATE SERPL-MCNC: 5.8 MG/DL — SIGNIFICANT CHANGE UP (ref 2.5–7)
WBC # BLD: 2.16 K/UL — LOW (ref 4.8–10.8)
WBC # BLD: 5.54 K/UL — SIGNIFICANT CHANGE UP (ref 4.8–10.8)
WBC # FLD AUTO: 2.16 K/UL — LOW (ref 4.8–10.8)
WBC # FLD AUTO: 5.54 K/UL — SIGNIFICANT CHANGE UP (ref 4.8–10.8)

## 2020-06-24 PROCEDURE — 88189 FLOWCYTOMETRY/READ 16 & >: CPT | Mod: 59

## 2020-06-24 PROCEDURE — 76770 US EXAM ABDO BACK WALL COMP: CPT | Mod: 26

## 2020-06-24 PROCEDURE — 99223 1ST HOSP IP/OBS HIGH 75: CPT

## 2020-06-24 PROCEDURE — 99223 1ST HOSP IP/OBS HIGH 75: CPT | Mod: AI

## 2020-06-24 RX ORDER — MEROPENEM 1 G/30ML
500 INJECTION INTRAVENOUS EVERY 8 HOURS
Refills: 0 | Status: DISCONTINUED | OUTPATIENT
Start: 2020-06-24 | End: 2020-06-26

## 2020-06-24 RX ORDER — SODIUM CHLORIDE 9 MG/ML
1000 INJECTION, SOLUTION INTRAVENOUS
Refills: 0 | Status: DISCONTINUED | OUTPATIENT
Start: 2020-06-24 | End: 2020-06-26

## 2020-06-24 RX ORDER — SODIUM CHLORIDE 9 MG/ML
1000 INJECTION, SOLUTION INTRAVENOUS
Refills: 0 | Status: DISCONTINUED | OUTPATIENT
Start: 2020-06-24 | End: 2020-06-24

## 2020-06-24 RX ORDER — PANTOPRAZOLE SODIUM 20 MG/1
40 TABLET, DELAYED RELEASE ORAL
Refills: 0 | Status: DISCONTINUED | OUTPATIENT
Start: 2020-06-24 | End: 2020-06-26

## 2020-06-24 RX ADMIN — MEROPENEM 100 MILLIGRAM(S): 1 INJECTION INTRAVENOUS at 21:02

## 2020-06-24 RX ADMIN — MEROPENEM 100 MILLIGRAM(S): 1 INJECTION INTRAVENOUS at 15:17

## 2020-06-24 RX ADMIN — Medication 400 MILLIGRAM(S): at 05:52

## 2020-06-24 RX ADMIN — LATANOPROST 1 DROP(S): 0.05 SOLUTION/ DROPS OPHTHALMIC; TOPICAL at 21:19

## 2020-06-24 RX ADMIN — CEFEPIME 100 MILLIGRAM(S): 1 INJECTION, POWDER, FOR SOLUTION INTRAMUSCULAR; INTRAVENOUS at 05:52

## 2020-06-24 RX ADMIN — Medication 1000 UNIT(S): at 11:19

## 2020-06-24 RX ADMIN — Medication 650 MILLIGRAM(S): at 00:18

## 2020-06-24 RX ADMIN — SODIUM CHLORIDE 50 MILLILITER(S): 9 INJECTION, SOLUTION INTRAVENOUS at 15:19

## 2020-06-24 RX ADMIN — Medication 400 MILLIGRAM(S): at 17:47

## 2020-06-24 RX ADMIN — Medication 650 MILLIGRAM(S): at 14:01

## 2020-06-24 RX ADMIN — PANTOPRAZOLE SODIUM 40 MILLIGRAM(S): 20 TABLET, DELAYED RELEASE ORAL at 17:47

## 2020-06-24 RX ADMIN — Medication 70 MILLIGRAM(S): at 17:47

## 2020-06-24 RX ADMIN — Medication 1 MILLIGRAM(S): at 11:19

## 2020-06-24 NOTE — PROGRESS NOTE ADULT - SUBJECTIVE AND OBJECTIVE BOX
Patient is a 89y old  Female who presents with a chief complaint of Fever (2020 09:13)    INTERVAL HPI/OVERNIGHT EVENTS: reports having chills, feels slightly better, no dysuria. Not sure if she is having diarrhea.  ROS: Denies CP, SOB, AP  All other systems reviewed and are within normal limits.  InitialHPI:  89 year old female with a PMhx of diffuse large cell lymphoma, ITP, cervical disk inflammation.   Patient is on active chemotherapy for lymphoma. Her last session was one week prior at [facility] where she is getting Rituximab.   She has received 1 dose of rituximab for DLCL (1 week ago) and was scheduled for her second dose on .     CC: fever of 103 at home and chills    History goes back to: 1 day prior to admission when she started having chills and high grade fever.   She was at a barbecue with her relatives when she suddenly experienced these symptoms.   No exposure to COVID-19.      ROS is positive for: chronic urinary frequency    ROS is negative for: She reports no dysuria, no cough, no shortness of breath, no GI symptoms, no abdominal pain, no decrease PO intake.    Of note, patient had a UTI a few weeks ago and she completed a course of bactrim.     In the ED: Patient was hemodynamically stable, Emyq=114.1  WBC=2, and plts=22  UA --> mildly positive   CXR: no signs of acute cardiopulmonary disease (unofficial reading)  Patient got 1 dose of meropenem in the ED. (2020 22:52)    UTI(URINARY TRACT INFECTION),SEPSIS,THROMBOCYTOPENIA  ^UTI(URINARY TRACT INFECTION),SEPSIS,THROMBOCYTOPENIA  Handoff  MEWS Score  History of ITP  Overactive bladder  Diffuse large B cell lymphoma  UTI (urinary tract infection)  S/P total knee arthroplasty  FEVER  90+  Thrombocytopenia  Sepsis    PAST MEDICAL & SURGICAL HISTORY:  History of ITP  Overactive bladder  Diffuse large B cell lymphoma  S/P total knee arthroplasty: bilateral      General: NAD, AAO3  CV: S1 S2  Resp: decreased breath sounds at bases  GI: NT/ND/S +BS  MS: no clubbing/cyanosis/edema, 2+ pulses b/l  Neuro: NICHOLS, 2+reflexes thruout    MEDICATIONS  (STANDING):  acyclovir   Oral Tab/Cap 400 milliGRAM(s) Oral two times a day  cefepime   IVPB 2000 milliGRAM(s) IV Intermittent every 12 hours  cholecalciferol 1000 Unit(s) Oral daily  folic acid 1 milliGRAM(s) Oral daily  lactated ringers. 1000 milliLiter(s) (50 mL/Hr) IV Continuous <Continuous>  latanoprost 0.005% Ophthalmic Solution 1 Drop(s) Both EYES at bedtime  meropenem  IVPB 500 milliGRAM(s) IV Intermittent every 8 hours  pantoprazole    Tablet 40 milliGRAM(s) Oral before breakfast  predniSONE   Tablet 70 milliGRAM(s) Oral daily    MEDICATIONS  (PRN):  acetaminophen   Tablet .. 650 milliGRAM(s) Oral every 6 hours PRN Temp greater or equal to 38C (100.4F)    Home Medications:  acyclovir 400 mg oral tablet: 1 tab(s) orally 2 times a day (2020 00:06)  Centrum Silver oral tablet, chewable: 1 tab(s) orally once a day (2020 00:06)  folic acid 1 mg oral tablet: 1 tab(s) orally once a day (2020 00:06)  latanoprost 0.005% ophthalmic solution: 1 drop(s) to each affected eye once a day (in the evening) (2020 00:06)  Omega-3 oral capsule: 1 cap(s) orally once a day (2020 00:06)  Vitamin D3 1000 intl units (25 mcg) oral tablet: 1000 unit(s) orally once a day (2020 00:06)    Vital Signs Last 24 Hrs  T(C): 38.7 (2020 13:54), Max: 39 (2020 00:31)  T(F): 101.6 (2020 13:54), Max: 102.2 (2020 00:31)  HR: 92 (2020 13:54) (87 - 97)  BP: 106/53 (2020 13:54) (106/53 - 136/63)  BP(mean): --  RR: 18 (2020 13:54) (16 - 18)  SpO2: 98% (2020 18:58) (98% - 98%)  CAPILLARY BLOOD GLUCOSE        LABS:                        9.2    2.16  )-----------( 15       ( 2020 05:34 )             29.6     06-24    138  |  103  |  17  ----------------------------<  107<H>  4.4   |  21  |  1.1    Ca    8.6      2020 05:34    TPro  5.6<L>  /  Alb  3.5  /  TBili  0.6  /  DBili  x   /  AST  43<H>  /  ALT  28  /  AlkPhos  85  06-24    LIVER FUNCTIONS - ( 2020 05:34 )  Alb: 3.5 g/dL / Pro: 5.6 g/dL / ALK PHOS: 85 U/L / ALT: 28 U/L / AST: 43 U/L / GGT: x               PT/INR - ( 2020 07:14 )   PT: TNP sec;   INR: TNP ratio         PTT - ( 2020 07:14 )  PTT:tnp sec  Urinalysis Basic - ( 2020 20:09 )    Color: Yellow / Appearance: Clear / S.025 / pH: x  Gluc: x / Ketone: Negative  / Bili: Negative / Urobili: <2 mg/dL   Blood: x / Protein: Trace / Nitrite: Negative   Leuk Esterase: Large / RBC: 3 /HPF / WBC 11 /HPF   Sq Epi: x / Non Sq Epi: 3 /HPF / Bacteria: Negative              Culture - Urine (collected 2020 20:09)  Source: .Urine Clean Catch (Midstream)  Final Report (2020 21:16):    <10,000 CFU/mL Normal Urogenital Karmen    Culture - Blood (collected 2020 18:26)  Source: .Blood Blood  Preliminary Report (2020 02:00):    No growth to date.    Culture - Blood (collected 2020 18:26)  Source: .Blood Blood  Preliminary Report (2020 02:00):    No growth to date.      Chart, Consultant(s) Notes Reviewed:  [x ] YES  [ ] NO  Care Discussed with Consultants/Other Providers/ Housestaff [ x] YES  [ ] NO  Radiology, labs, old available records personally reviewed.

## 2020-06-24 NOTE — CONSULT NOTE ADULT - ASSESSMENT
89 yo female with PMHx of DLBCL, ITP, presented for fever with chills.     #Neutropenic fever -Unknown source at this time  #Hx of DLBCL (On Rituximab for 5 weeks s/p 1 dose on 6/16, due for next dose on 6/23)  #Moderate neutropenia () with thrombocytopenia (Plt 15)  - 89 yo female with PMHx of DLBCL, ITP, presented for fever with chills.     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  -If platelet count continue to trend down, can start IVIG vs steroids (Dexamethasone 40mg/day for 4 days (with no taper) or prednisone 1mg/kg for 1-2 weeks and then taper).  -Monitor CBC with differential daily. Transfuse platelet if less than 10k or if <50k with active bleeding.       ***INCOMPLETE NOTE*** 89 yo female with PMHx of DLBCL, ITP, presented for fever with chills.     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  -If platelet count continue to trend down, can start IVIG vs steroids (Dexamethasone 40mg/day for 4 days (with no taper) or prednisone 1mg/kg for 1-2 weeks and then taper).  -Monitor CBC with differential daily.  -Transfuse platelet if less than 10k or if <50k with active bleeding.   -Get records from Dr Hernandez's office if possible -Prev testing (HIV, Hepatitis panel, EBV, BM biopsy, prev CBC)    #Neutropenic fever with sepsis present on admission-Likely source is acute pyelonephritis   -UA shows no pyuria and Ucx negative likely because pt recently was on PO Bactrim.   -Treat with meropenem 500mg IV q8hrly for at least 7-10 days  -Neutropenic precautions  -Follow up with ID for further recommendations.       ***INCOMPLETE NOTE*** 89 yo female with PMHx of DLBCL, ITP, presented for fever with chills.     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 Pt was due for rituximab as well outpt.   -We can try 1 dose of Neupogen for neutropenia  -Monitor CBC with differential daily.  -Transfuse platelet if less than 10k or if <50k with active bleeding.   -Get records from Dr Hernandez's office if possible -Prev testing(HIV, Hepatitis panel, EBV, BM biopsy, prev CBC), type and staging of lymphoma, pre bone marrow biopsies.    #Neutropenic fever with sepsis present on admission-Likely source is acute pyelonephritis   -UA shows no pyuria and Ucx negative likely because pt recently was on PO Bactrim.   -Treat with meropenem 500mg IV q8hrly for at least 7-10 days  -Neutropenic precautions  -Follow up with ID for further recommendations.       ***INCOMPLETE NOTE*** 89 yo female with PMHx of low grade B cell lymphoma with bone marrow involvement, ITP, presented for fever with chills.     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 Pt was due for rituximab as well outpt.   -We can try 1 dose of Neupogen for neutropenia  -Monitor CBC with differential daily.  -Transfuse platelet if less than 10k or if <50k with active bleeding.   -Get records from Dr Hernandez's office if possible -Prev testing(HIV, Hepatitis panel, EBV, BM biopsy, prev CBC), type and staging of lymphoma, pre bone marrow biopsies.    #Neutropenic fever with sepsis present on admission-Likely source is acute pyelonephritis   -UA shows no pyuria and Ucx negative likely because pt recently was on PO Bactrim.   -Treat with meropenem 500mg IV q8hrly for at least 7-10 days  -Neutropenic precautions  -Follow up with ID for further recommendations.       ***INCOMPLETE NOTE*** 89 yo female with PMHx of low grade B cell lymphoma with bone marrow involvement, immune thrombocytopenia, presented for fever with chills.   #Thrombocytopenia  -Likely due to sepsis vs drug induced (ws on bactrim recently) with history of Immune thrombocytopenia (was treated with Rituxan 375mg/m2 on 6/16/2020).   -HIV, Hepatitis panel was recently checked on 6/10/2020 -Negative  -Treat the underlying infection with IV antibiotics per ID.  -Continue to monitor platelet count for now. Transfuse platelet if less than 10k or if <50k with active bleeding.   -If platelet count continues to trend down, can consider trial of steroids if okay with ID -Prednisone 1mg/kg daily with Protonix 40mg daily    #Chronic normocytic anemia   -Hb 9.2; baseline around 10  -Check Vit B12, folate, haptoglobin, LDH, retic count, Zackary profile, SPEP  -C/w folic acid supplementation    #Neutropenia with Hx of low grade B cell lymphoma with bone marrow involvement in 2017 (; no neutropenia on admission)   -Could be autoimmune (hard to say given no recent flow, bone marrow biopsy or imaging) vs due to sepsis  -Last flow cytometry from 2017 showed T cell granulocytes    -Send peripheral flow cytometry; Check Quantitative immunoglobulin levels  -No need fo Neupogen at this time  -Pt can follow up with Dr Hernandez for further workup outpatient    #Acute pyelonephritis with sepsis present on admission  -Treat with abx per ID  -Neutropenic precautions  -Pt is having diarrheal episodes -Would recommend sending C diff with appropriate contact isolation in the meantime. 89 yo female with PMHx of low grade B cell lymphoma with bone marrow involvement, ?immune thrombocytopenia, presented for fever with chills.       #Thrombocytopenia -Likely due to sepsis vs drug induced (ws on bactrim recently) with history of Immune thrombocytopenia (was treated with one dose Rituxan 375mg/m2 on 6/16/2020).   -HIV, Hepatitis panel was recently checked on 6/10/2020 -Negative  -Treat the underlying infection with IV antibiotics per ID.  -Continue to monitor platelet count for now. Transfuse platelet if less than 10k or if active bleeding.   If Ok with ID- can start her on prednisone 1mg/kg once daily with Protonix 40mg daily- We can see if platelets improve with steroids     #Chronic normocytic anemia   -Hb 9.2; baseline around 10  -Check Vit B12, folate, haptoglobin, LDH, retic count, Zackary profile, SPEP, HAROON  -C/w folic acid supplementation    #Neutropenia with Hx of low grade B cell lymphoma with bone marrow involvement in 2017 (; no neutropenia on admission)   -Could be autoimmune (hard to say given no recent flow, bone marrow biopsy or imaging) vs due to sepsis  -Last flow cytometry from 2017 showed T cell granulocytes    -Send peripheral flow cytometry; Check Quantitative immunoglobulin levels  -No need for Neupogen at this time  -Pt can follow up with Dr Hernandez for further workup outpatient    #Acute pyelonephritis with sepsis present on admission  -Treat with abx per ID  -Neutropenic precautions  -Pt is having diarrheal episodes -Would recommend sending C diff with appropriate contact isolation in the meantime.     Above plan discussed with medical resident   Dr Garrison also spoke to pt's Oncologist Dr Hernandez    We will follow with you 89 yo female with PMHx of low grade B cell lymphoma with bone marrow involvement,  Pt has been chronically neutropenic and thrombocytopenic  treated previously with Rituxan and steroids.    #Thrombocytopenia -Likely worsened due to sepsis vs drug induced (ws on bactrim recently) with history of Immune thrombocytopenia (was treated with one dose Rituxan 375mg/m2 on 6/16/2020).   -HIV, Hepatitis panel was recently checked on 6/10/2020 -Negative  -Treat the underlying infection with IV antibiotics per ID.  -Continue to monitor platelet count for now. Transfuse platelet if less than 10k or if active bleeding.   If Ok with ID- can start her on prednisone 1mg/kg once daily with Protonix 40mg daily- We can see if platelets improve with steroids     #Chronic normocytic anemia   -Hb 9.2; baseline around 10  -Check Vit B12, folate, haptoglobin, LDH, retic count, Zackary profile, SPEP, HAROON  -C/w folic acid supplementation    #Neutropenia with Hx of low grade B cell lymphoma with bone marrow involvement in 2017 (; no neutropenia on admission)   -Could be autoimmune (hard to say given no recent flow, bone marrow biopsy or imaging) vs due to sepsis  -Last flow cytometry from 2017 showed T cell granulocytes    -Send peripheral flow cytometry; Check Quantitative immunoglobulin levels  -No need for Neupogen at this time  -Pt can follow up with Dr Hernandez for further workup outpatient    #Acute pyelonephritis with sepsis present on admission  -Treat with abx per ID  -Neutropenic precautions  -Pt is having diarrheal episodes -Would recommend sending C diff with appropriate contact isolation in the meantime.     Above plan discussed with medical resident   Dr Garrison also spoke to pt's Oncologist Dr Hernandez    We will follow with you

## 2020-06-24 NOTE — CONSULT NOTE ADULT - ASSESSMENT
89 yo female with PMHx of DLBCL, ITP, presented for fever with chills.     IMPRESSION;   #Sepsis ( T 102, WBC 1.9 ) secondary to possible acute pyelonephritis  No pyuria ( was on po Bactrim 2 wks PTA )  Doubt drug induced fevers ( would expect a rash )  No PNA  Blood & Urine cxs NGTD 6/22  COVID-19 NG 6/22    RECOMMENDATIONS;   Meropenem 500 mg iv q8h

## 2020-06-24 NOTE — CONSULT NOTE ADULT - SUBJECTIVE AND OBJECTIVE BOX
MONTSENEYMAR SINGH  89y, Female  Allergy: iodine (Unknown)  penicillins (Unknown)  Vicodin (Unknown)      All historical available data reviewed.    HPI:  89 year old female with a PMhx of diffuse large cell lymphoma, ITP, cervical disk inflammation.   Patient is on active chemotherapy for lymphoma. Her last session was one week prior at [facility] where she is getting Rituximab.   She has received 1 dose of rituximab for DLCL (1 week ago) and was scheduled for her second dose on .     CC: fever of 103 at home and chills    History goes back to: 1 day prior to admission when she started having chills and high grade fever.   She was at a barbecue with her relatives when she suddenly experienced these symptoms.   No exposure to COVID-19.      ROS is positive for: chronic urinary frequency    ROS is negative for: She reports no dysuria, no cough, no shortness of breath, no GI symptoms, no abdominal pain, no decrease PO intake.    Of note, patient had a UTI a few weeks ago and she completed a course of bactrim.     In the ED: Patient was hemodynamically stable, Vdan=459.1  WBC=2, and plts=22  UA --> mildly positive   CXR: no signs of acute cardiopulmonary disease (unofficial reading)  Patient got 1 dose of meropenem in the ED. (2020 22:52)    FAMILY HISTORY:    PAST MEDICAL & SURGICAL HISTORY:  History of ITP  Overactive bladder  Diffuse large B cell lymphoma  S/P total knee arthroplasty: bilateral        VITALS:  T(F): 99.1, Max: 102.2 (20 @ 00:31)  HR: 97  BP: 112/55  RR: 18Vital Signs Last 24 Hrs  T(C): 37.3 (2020 05:46), Max: 39 (2020 00:31)  T(F): 99.1 (2020 05:46), Max: 102.2 (2020 00:31)  HR: 97 (2020 05:46) (87 - 97)  BP: 112/55 (2020 05:46) (112/55 - 141/60)  BP(mean): --  RR: 18 (2020 05:46) (16 - 18)  SpO2: 98% (2020 18:58) (98% - 98%)    TESTS & MEASUREMENTS:                        9.2    2.16  )-----------( 15       ( 2020 05:34 )             29.6         138  |  103  |  17  ----------------------------<  107<H>  4.4   |  21  |  1.1    Ca    8.6      2020 05:34    TPro  5.6<L>  /  Alb  3.5  /  TBili  0.6  /  DBili  x   /  AST  43<H>  /  ALT  28  /  AlkPhos  85  24    LIVER FUNCTIONS - ( 2020 05:34 )  Alb: 3.5 g/dL / Pro: 5.6 g/dL / ALK PHOS: 85 U/L / ALT: 28 U/L / AST: 43 U/L / GGT: x             Culture - Urine (collected 20 @ 20:09)  Source: .Urine Clean Catch (Midstream)  Final Report (20 @ 21:16):    <10,000 CFU/mL Normal Urogenital Karmen    Culture - Blood (collected 20 @ 18:26)  Source: .Blood Blood  Preliminary Report (20 @ 02:00):    No growth to date.    Culture - Blood (collected 20 @ 18:26)  Source: .Blood Blood  Preliminary Report (20 @ 02:00):    No growth to date.      Urinalysis Basic - ( 2020 20:09 )    Color: Yellow / Appearance: Clear / S.025 / pH: x  Gluc: x / Ketone: Negative  / Bili: Negative / Urobili: <2 mg/dL   Blood: x / Protein: Trace / Nitrite: Negative   Leuk Esterase: Large / RBC: 3 /HPF / WBC 11 /HPF   Sq Epi: x / Non Sq Epi: 3 /HPF / Bacteria: Negative          RADIOLOGY & ADDITIONAL TESTS:  Personal review of radiological diagnostics performed  Echo and EKG results noted when applicable.     MEDICATIONS:  acetaminophen   Tablet .. 650 milliGRAM(s) Oral every 6 hours PRN  acyclovir   Oral Tab/Cap 400 milliGRAM(s) Oral two times a day  cefepime   IVPB 2000 milliGRAM(s) IV Intermittent every 12 hours  cholecalciferol 1000 Unit(s) Oral daily  folic acid 1 milliGRAM(s) Oral daily  latanoprost 0.005% Ophthalmic Solution 1 Drop(s) Both EYES at bedtime      ANTIBIOTICS:  acyclovir   Oral Tab/Cap 400 milliGRAM(s) Oral two times a day  cefepime   IVPB 2000 milliGRAM(s) IV Intermittent every 12 hours

## 2020-06-24 NOTE — CONSULT NOTE ADULT - SUBJECTIVE AND OBJECTIVE BOX
Patient is a 89y old  Female who presents with a chief complaint of Fever and chills (2020 09:12)    REASON FOR CONSULT: Fever with neutropenia in the setting of DLBCL    HPI:  89 year old female with a PMhx of diffuse large cell lymphoma, ITP, cervical disk inflammation.   Patient is on active chemotherapy for lymphoma. Her last session was one week prior at [facility] where she is getting Rituximab.   She has received 1 dose of rituximab for DLCL (1 week ago) and was scheduled for her second dose on .   CC: fever of 103 at home and chills  History goes back to: 1 day prior to admission when she started having chills and high grade fever.   She was at a barbecue with her relatives when she suddenly experienced these symptoms.   No exposure to COVID-19.    ROS is positive for: chronic urinary frequency  ROS is negative for: She reports no dysuria, no cough, no shortness of breath, no GI symptoms, no abdominal pain, no decrease PO intake.  Of note, patient had a UTI a few weeks ago and she completed a course of bactrim.   In the ED: Patient was hemodynamically stable, Mzug=042.1  WBC=2, and plts=22; UA --> mildly positive     Hospital course:  Pt admitted for neutropenic fever, with Tmax of 102.2F. CXR does not show any focal consolidation. UA showed large leuk esterase, otherwise unremarkable. Blood cx negative. Urine cx showed <08891 normal urogenital brett. Pt on cefepime and acyclovir. COVID-PCR negative on . ID consulted as well.     Interval history:      PAST MEDICAL & SURGICAL HISTORY:  History of ITP  Overactive bladder  Diffuse large B cell lymphoma  S/P total knee arthroplasty: bilateral      SOCIAL HISTORY:  Lives with                       at   Smoking:  Alcohol:  Illicit drugs:    ECOG:    FAMILY HISTORY:      Allergies  iodine (Unknown)  penicillins -Rash  Vicodin (Unknown)    ROS:  Negative except for:      Height (cm): 160 (20 @ 18:58)  Weight (kg): 73.6 (20 @ 18:58)  BMI (kg/m2): 28.7 (20 @ 18:58)  BSA (m2): 1.77 (20 @ 18:58)    Vital Signs Last 24 Hrs  T(C): 37.3 (2020 05:46), Max: 39 (2020 00:31)  T(F): 99.1 (2020 05:46), Max: 102.2 (2020 00:31)  HR: 97 (2020 05:46) (87 - 97)  BP: 112/55 (2020 05:46) (112/55 - 141/60)  RR: 18 (2020 05:46) (16 - 18)  SpO2: 98% (2020 18:58) (98% - 98%)    PHYSICAL EXAM  General: adult in NAD  HEENT: clear oropharynx, anicteric sclera, pink conjunctiva  Neck: supple  CV: normal S1/S2 with no murmur rubs or gallops  Lungs: positive air movement b/l ant lungs,clear to auscultation, no wheezes, no rales  Abdomen: soft non-tender non-distended, no hepatosplenomegaly  Ext: no clubbing cyanosis or edema  Skin: no rashes and no petechiae  Neuro: alert and oriented X 4, no focal deficits    MEDICATIONS:  HOME:  acyclovir 400 mg oral tablet: 1 tab(s) orally 2 times a day (2020 00:06)  Centrum Silver oral tablet, chewable: 1 tab(s) orally once a day (2020 00:06)  folic acid 1 mg oral tablet: 1 tab(s) orally once a day (2020 00:06)  latanoprost 0.005% ophthalmic solution: 1 drop(s) to each affected eye once a day (in the evening) (2020 00:06)  Omega-3 oral capsule: 1 cap(s) orally once a day (2020 00:06)  Vitamin D3 1000 intl units (25 mcg) oral tablet: 1000 unit(s) orally once a day (2020 00:06)    INPATIENT  acyclovir   Oral Tab/Cap 400 milliGRAM(s) Oral two times a day  cefepime   IVPB 2000 milliGRAM(s) IV Intermittent every 12 hours  cholecalciferol 1000 Unit(s) Oral daily  folic acid 1 milliGRAM(s) Oral daily  latanoprost 0.005% Ophthalmic Solution 1 Drop(s) Both EYES at bedtime  acetaminophen   Tablet .. 650 milliGRAM(s) Oral every 6 hours PRN Temp greater or equal to 38C (100.4F)      LABS:                          9.2    2.16  )-----------( 15       ( 2020 05:34 )             29.6         Mean Cell Volume : 87.3 fL  Mean Cell Hemoglobin : 27.1 pg  Mean Cell Hemoglobin Concentration : 31.1 g/dL  Auto Neutrophil # : 0.78 K/uL  Auto Lymphocyte # : 1.03 K/uL  Auto Monocyte # : 0.14 K/uL  Auto Eosinophil # : 0.03 K/uL  Auto Basophil # : 0.01 K/uL  Auto Neutrophil % : 36.0 %  Auto Lymphocyte % : 47.7 %  Auto Monocyte % : 6.5 %  Auto Eosinophil % : 1.4 %  Auto Basophil % : 0.5 %      Serial CBC's   @ 05:34  Hct-29.6 / Hgb-9.2 / Plat-15 / RBC-3.39 / WBC-2.16  Serial CBC's   @ 07:14  Hct-26.9 / Hgb-8.8 / Plat-17 / RBC-3.04 / WBC-1.92  Serial CBC's   @ 18:26  Hct-28.8 / Hgb-9.0 / Plat-22 / RBC-3.33 / WBC-2.63      06-24    138  |  103  |  17  ----------------------------<  107<H>  4.4   |  21  |  1.1    Ca    8.6      2020 05:34    TPro  5.6<L>  /  Alb  3.5  /  TBili  0.6  /  DBili  x   /  AST  43<H>  /  ALT  28  /  AlkPhos  85  06-24      PT/INR - ( 2020 07:14 )   PT: TNP sec;   INR: TNP ratio    PTT - ( 2020 07:14 )  PTT:tnp sec    Urinalysis Basic - ( 2020 20:09 )  Color: Yellow / Appearance: Clear / S.025 / pH: x  Gluc: x / Ketone: Negative  / Bili: Negative / Urobili: <2 mg/dL   Blood: x / Protein: Trace / Nitrite: Negative   Leuk Esterase: Large / RBC: 3 /HPF / WBC 11 /HPF   Sq Epi: x / Non Sq Epi: 3 /HPF / Bacteria: Negative      Culture - Urine (collected 2020 20:09)  Source: .Urine Clean Catch (Midstream)  Final Report (2020 21:16):    <10,000 CFU/mL Normal Urogenital Brett    Culture - Blood (collected 2020 18:26)  Source: .Blood Blood  Preliminary Report (2020 02:00):    No growth to date.    Culture - Blood (collected 2020 18:26)  Source: .Blood Blood  Preliminary Report (2020 02:00):    No growth to date.    RADIOLOGY & ADDITIONAL STUDIES:    Xray Chest 1 View- PORTABLE-Urgent (20 @ 20:19) >    Impression:    No radiographic evidence of acute cardiopulmonary disease. Patient is a 89y old  Female who presents with a chief complaint of Fever and chills (2020 09:12)    REASON FOR CONSULT: Fever with neutropenia in the setting of DLBCL    HPI:  89 year old female with a PMhx of diffuse large cell lymphoma, ITP, cervical disk inflammation, urinary stress incontinence   Patient is on active chemotherapy for lymphoma. Her last session was one week prior at [facility] where she is getting Rituximab.   She has received 1 dose of rituximab for DLCL (1 week ago) and was scheduled for her second dose on .   CC: fever of 103 at home and chills  History goes back to: 1 day prior to admission when she started having chills and high grade fever.   She was at a barbecue with her relatives when she suddenly experienced these symptoms.   No exposure to COVID-19.    ROS is positive for: chronic urinary frequency  ROS is negative for: She reports no dysuria, no cough, no shortness of breath, no GI symptoms, no abdominal pain, no decrease PO intake.  Of note, patient had a UTI a few weeks ago and she completed a course of bactrim.   In the ED: Patient was hemodynamically stable, Pxut=744.1  WBC=2, and plts=22; UA --> mildly positive     Hospital course:  Pt admitted for neutropenic fever, with Tmax of 102.2F. CXR does not show any focal consolidation. UA showed large leuk esterase, otherwise unremarkable. Blood cx negative. Urine cx showed <[id number] normal urogenital brett. Pt on cefepime and acyclovir. COVID-PCR negative on . ID consulted as well.     Interval history:  Per pt, she attended a BBQ on , where she had to hold her urine for several hours, and on monday she started having fever 103.9F associated with chills. Pt also had fever with chills last night and this AM. Pt denied any difficulty breathing, cough, chest pain. Pt complains of right sided abdominal pain that has been present for years, is episodic, non radiating, and is not associated with nausea/vomiting/diarrhea or constipation.  Pt had 1 episode of vomiting in the ER as well, which was non bloody and non bilious. Pt had an episode of loose stool this AM, which was non bloody (pt has had hx of bright red blood mixed with stool about 3 weeks ago).   Pt endorsed having increased urinary frequency but no dysuria or hesitancy.   Other than the bluish/black discoloration of b/l UE and LE, pt denied any other skin rash.     Hem/Onc History:     PAST MEDICAL & SURGICAL HISTORY:  History of ITP  Overactive bladder  Diffuse large B cell lymphoma  S/P total knee arthroplasty: bilateral  Hx of colon polyps s/p removal  Hx of hysterectomy  Hx of cholecystectomy       SOCIAL HISTORY:  Lives alone and is independent with ADLs;  and has 6 children with good family support  Smoking: None  Alcohol: Occasional wine  Illicit drugs: None    ECO (capable of self care, but no work activities, out of bed >50% of the day)    FAMILY HISTORY:  Hx of malignant Colon Ca -  and brother (Last colonoscopy >10 yrs ago, normal per pt)   Hx of Brest malignancy- Sister (Last mammo  -normal)    Allergies  iodine (Unknown)  penicillins -Rash  Vicodin (Unknown)    ROS:  Negative except for:  fever  Chills  Right sided abdominal pain  loose stool x1; vomiting x1  Increased urinary frequency    Height (cm): 160 (20 @ 18:58)  Weight (kg): 73.6 (20 @ 18:58)  BMI (kg/m2): 28.7 (20 @ 18:58)  BSA (m2): 1.77 (20 @ 18:58)    Vital Signs Last 24 Hrs  T(C): 37.3 (2020 05:46), Max: 39 (2020 00:31)  T(F): 99.1 (2020 05:46), Max: 102.2 (2020 00:31)  HR: 97 (2020 05:46) (87 - 97)  BP: 112/55 (2020 05:46) (112/55 - 141/60)  RR: 18 (2020 05:46) (16 - 18)  SpO2: 98% (2020 18:58) (98% - 98%)    PHYSICAL EXAM  General: adult in NAD  HEENT: clear oropharynx, anicteric sclera, pink conjunctiva  Neck: supple  CV: normal S1/S2 with no murmur rubs or gallops  Lungs: positive air movement b/l ant lungs, clear to auscultation, no wheezes, no rales  Abdomen: soft non-tender non-distended, no hepatosplenomegaly  Ext: no clubbing cyanosis or edema  Skin: Black/blue discoloration present on b/l UE; Legs covered with SCDs.   Neuro: alert and oriented X 4, no focal deficits    MEDICATIONS:  HOME:  acyclovir 400 mg oral tablet: 1 tab(s) orally 2 times a day (2020 00:06)  Centrum Silver oral tablet, chewable: 1 tab(s) orally once a day (2020 00:06)  folic acid 1 mg oral tablet: 1 tab(s) orally once a day (2020 00:06)  latanoprost 0.005% ophthalmic solution: 1 drop(s) to each affected eye once a day (in the evening) (2020 00:06)  Omega-3 oral capsule: 1 cap(s) orally once a day (2020 00:06)  Vitamin D3 1000 intl units (25 mcg) oral tablet: 1000 unit(s) orally once a day (2020 00:06)    INPATIENT  acyclovir   Oral Tab/Cap 400 milliGRAM(s) Oral two times a day  cefepime   IVPB 2000 milliGRAM(s) IV Intermittent every 12 hours  cholecalciferol 1000 Unit(s) Oral daily  folic acid 1 milliGRAM(s) Oral daily  latanoprost 0.005% Ophthalmic Solution 1 Drop(s) Both EYES at bedtime  acetaminophen   Tablet .. 650 milliGRAM(s) Oral every 6 hours PRN Temp greater or equal to 38C (100.4F)      LABS:                          9.2    2.16  )-----------( 15       ( 2020 05:34 )             29.6         Mean Cell Volume : 87.3 fL  Mean Cell Hemoglobin : 27.1 pg  Mean Cell Hemoglobin Concentration : 31.1 g/dL  Auto Neutrophil # : 0.78 K/uL  Auto Lymphocyte # : 1.03 K/uL  Auto Monocyte # : 0.14 K/uL  Auto Eosinophil # : 0.03 K/uL  Auto Basophil # : 0.01 K/uL  Auto Neutrophil % : 36.0 %  Auto Lymphocyte % : 47.7 %  Auto Monocyte % : 6.5 %  Auto Eosinophil % : 1.4 %  Auto Basophil % : 0.5 %      Serial CBC's   @ 05:34  Hct-29.6 / Hgb-9.2 / Plat-15 / RBC-3.39 / WBC-2.16  Serial CBC's   @ 07:14  Hct-26.9 / Hgb-8.8 / Plat-17 / RBC-3.04 / WBC-1.92  Serial CBC's   @ 18:26  Hct-28.8 / Hgb-9.0 / Plat-22 / RBC-3.33 / WBC-2.63      06    138  |  103  |  17  ----------------------------<  107<H>  4.4   |  21  |  1.1    Ca    8.6      2020 05:34    TPro  5.6<L>  /  Alb  3.5  /  TBili  0.6  /  DBili  x   /  AST  43<H>  /  ALT  28  /  AlkPhos  85  0624      PT/INR - ( 2020 07:14 )   PT: TNP sec;   INR: TNP ratio    PTT - ( 2020 07:14 )  PTT:tnp sec    Urinalysis Basic - ( 2020 20:09 )  Color: Yellow / Appearance: Clear / S.025 / pH: x  Gluc: x / Ketone: Negative  / Bili: Negative / Urobili: <2 mg/dL   Blood: x / Protein: Trace / Nitrite: Negative   Leuk Esterase: Large / RBC: 3 /HPF / WBC 11 /HPF   Sq Epi: x / Non Sq Epi: 3 /HPF / Bacteria: Negative      Culture - Urine (collected 2020 20:09)  Source: .Urine Clean Catch (Midstream)  Final Report (2020 21:16):    <10,000 CFU/mL Normal Urogenital Brett    Culture - Blood (collected 2020 18:26)  Source: .Blood Blood  Preliminary Report (2020 02:00):    No growth to date.    Culture - Blood (collected 2020 18:26)  Source: .Blood Blood  Preliminary Report (2020 02:00):    No growth to date.    RADIOLOGY & ADDITIONAL STUDIES:    Xray Chest 1 View- PORTABLE-Urgent (20 @ 20:19) >    Impression:    No radiographic evidence of acute cardiopulmonary disease. Patient is a 89y old  Female who presents with a chief complaint of Fever and chills (2020 09:12)    REASON FOR CONSULT: Fever with neutropenia in the setting of DLBCL    HPI:  89 year old female with a PMhx of diffuse large cell lymphoma, ITP, cervical disk inflammation, urinary stress incontinence   Patient is on active chemotherapy for lymphoma. Her last session was one week prior at [facility] where she is getting Rituximab.   She has received 1 dose of rituximab for DLCL (1 week ago) and was scheduled for her second dose on .   CC: fever of 103 at home and chills  History goes back to: 1 day prior to admission when she started having chills and high grade fever.   She was at a barbecue with her relatives when she suddenly experienced these symptoms.   No exposure to COVID-19.    ROS is positive for: chronic urinary frequency  ROS is negative for: She reports no dysuria, no cough, no shortness of breath, no GI symptoms, no abdominal pain, no decrease PO intake.  Of note, patient had a UTI a few weeks ago and she completed a course of bactrim.   In the ED: Patient was hemodynamically stable, Klua=209.1  WBC=2, and plts=22; UA --> mildly positive     Hospital course:  Pt admitted for neutropenic fever, with Tmax of 102.2F. CXR does not show any focal consolidation. UA showed large leuk esterase, otherwise unremarkable. Blood cx negative. Urine cx showed <99275 normal urogenital karmen. Pt on cefepime and acyclovir. COVID-PCR negative on . ID consulted as well.     Interval history:  Per pt, she attended a BBQ on , where she had to hold her urine for several hours, and on monday she started having fever 103.9F associated with chills. Pt also had fever with chills last night and this AM. Pt denied any difficulty breathing, cough, chest pain. Pt complains of right sided abdominal pain that has been present for years, is episodic, non radiating, and is not associated with nausea/vomiting/diarrhea or constipation.  Pt had 1 episode of vomiting in the ER as well, which was non bloody and non bilious. Pt had an episode of loose stool this AM, which was non bloody (pt has had hx of bright red blood mixed with stool about 3 weeks ago).   Pt endorsed having increased urinary frequency but no dysuria or hesitancy.   Other than the bluish/black discoloration of b/l UE and LE, pt denied any other skin rash.     Hem/Onc History: Pt is being treated for DLBCL and low platelets (?ITP) by Dr Michael Hernandez at [facility]. Pt received 8 weeks of weekly Rituxan 4 years ago for 'low platelets' and 'large lymph nodes'. Rituxan was started last week again for a course of 5 weeks this time.     PAST MEDICAL & SURGICAL HISTORY:  History of ITP  Overactive bladder  Diffuse large B cell lymphoma  S/P total knee arthroplasty: bilateral  Hx of colon polyps s/p removal  Hx of hysterectomy  Hx of cholecystectomy       SOCIAL HISTORY:  Lives alone and is independent with ADLs;  and has 6 children with good family support  Smoking: None  Alcohol: Occasional wine  Illicit drugs: None    ECO (capable of self care, but no work activities, out of bed >50% of the day)    FAMILY HISTORY:  Hx of malignant Colon Ca -  and brother (Last colonoscopy >10 yrs ago, normal per pt)   Hx of Brest malignancy- Sister (Last mammo  -normal)    Allergies  iodine (Unknown)  penicillins -Rash  Vicodin (Unknown)    ROS:  Negative except for:  fever  Chills  Right sided abdominal pain  loose stool x1; vomiting x1  Increased urinary frequency    Height (cm): 160 (20 @ 18:58)  Weight (kg): 73.6 (20 @ 18:58)  BMI (kg/m2): 28.7 (20 @ 18:58)  BSA (m2): 1.77 (20 @ 18:58)    Vital Signs Last 24 Hrs  T(C): 37.3 (2020 05:46), Max: 39 (2020 00:31)  T(F): 99.1 (2020 05:46), Max: 102.2 (2020 00:31)  HR: 97 (2020 05:46) (87 - 97)  BP: 112/55 (2020 05:46) (112/55 - 141/60)  RR: 18 (2020 05:46) (16 - 18)  SpO2: 98% (2020 18:58) (98% - 98%)    PHYSICAL EXAM  General: adult in NAD  HEENT: clear oropharynx, anicteric sclera, pink conjunctiva  Neck: supple  CV: normal S1/S2 with no murmur rubs or gallops  Lungs: positive air movement b/l ant lungs, clear to auscultation, no wheezes, no rales  Abdomen: soft non-tender non-distended, no hepatosplenomegaly  Ext: no clubbing cyanosis or edema  Skin: Black/blue discoloration present on b/l UE; Legs covered with SCDs.   Neuro: alert and oriented X 4, no focal deficits    MEDICATIONS:  HOME:  acyclovir 400 mg oral tablet: 1 tab(s) orally 2 times a day (2020 00:06)  Centrum Silver oral tablet, chewable: 1 tab(s) orally once a day (2020 00:06)  folic acid 1 mg oral tablet: 1 tab(s) orally once a day (2020 00:06)  latanoprost 0.005% ophthalmic solution: 1 drop(s) to each affected eye once a day (in the evening) (2020 00:06)  Omega-3 oral capsule: 1 cap(s) orally once a day (2020 00:06)  Vitamin D3 1000 intl units (25 mcg) oral tablet: 1000 unit(s) orally once a day (2020 00:06)    INPATIENT  acyclovir   Oral Tab/Cap 400 milliGRAM(s) Oral two times a day  cefepime   IVPB 2000 milliGRAM(s) IV Intermittent every 12 hours  cholecalciferol 1000 Unit(s) Oral daily  folic acid 1 milliGRAM(s) Oral daily  latanoprost 0.005% Ophthalmic Solution 1 Drop(s) Both EYES at bedtime  acetaminophen   Tablet .. 650 milliGRAM(s) Oral every 6 hours PRN Temp greater or equal to 38C (100.4F)      LABS:                          9.2    2.16  )-----------( 15       ( 2020 05:34 )             29.6         Mean Cell Volume : 87.3 fL  Mean Cell Hemoglobin : 27.1 pg  Mean Cell Hemoglobin Concentration : 31.1 g/dL  Auto Neutrophil # : 0.78 K/uL  Auto Lymphocyte # : 1.03 K/uL  Auto Monocyte # : 0.14 K/uL  Auto Eosinophil # : 0.03 K/uL  Auto Basophil # : 0.01 K/uL  Auto Neutrophil % : 36.0 %  Auto Lymphocyte % : 47.7 %  Auto Monocyte % : 6.5 %  Auto Eosinophil % : 1.4 %  Auto Basophil % : 0.5 %      Serial CBC's   @ 05:34  Hct-29.6 / Hgb-9.2 / Plat-15 / RBC-3.39 / WBC-2.16  Serial CBC's   @ 07:14  Hct-26.9 / Hgb-8.8 / Plat-17 / RBC-3.04 / WBC-1.92  Serial CBC's   @ 18:26  Hct-28.8 / Hgb-9.0 / Plat-22 / RBC-3.33 / WBC-2.63          138  |  103  |  17  ----------------------------<  107<H>  4.4   |  21  |  1.1    Ca    8.6      2020 05:34    TPro  5.6<L>  /  Alb  3.5  /  TBili  0.6  /  DBili  x   /  AST  43<H>  /  ALT  28  /  AlkPhos  85  06-24      PT/INR - ( 2020 07:14 )   PT: TNP sec;   INR: TNP ratio    PTT - ( 2020 07:14 )  PTT:tnp sec    Urinalysis Basic - ( 2020 20:09 )  Color: Yellow / Appearance: Clear / S.025 / pH: x  Gluc: x / Ketone: Negative  / Bili: Negative / Urobili: <2 mg/dL   Blood: x / Protein: Trace / Nitrite: Negative   Leuk Esterase: Large / RBC: 3 /HPF / WBC 11 /HPF   Sq Epi: x / Non Sq Epi: 3 /HPF / Bacteria: Negative      Culture - Urine (collected 2020 20:09)  Source: .Urine Clean Catch (Midstream)  Final Report (2020 21:16):    <10,000 CFU/mL Normal Urogenital Karmen    Culture - Blood (collected 2020 18:26)  Source: .Blood Blood  Preliminary Report (2020 02:00):    No growth to date.    Culture - Blood (collected 2020 18:26)  Source: .Blood Blood  Preliminary Report (2020 02:00):    No growth to date.    RADIOLOGY & ADDITIONAL STUDIES:    Xray Chest 1 View- PORTABLE-Urgent (20 @ 20:19) >    Impression:    No radiographic evidence of acute cardiopulmonary disease. Patient is a 89y old  Female who presents with a chief complaint of Fever and chills (2020 09:12)    REASON FOR CONSULT: Fever with neutropenia in the setting of DLBCL    HPI:  89 year old female with a PMhx of diffuse large cell lymphoma, ITP, cervical disk inflammation, urinary stress incontinence   Patient is on active chemotherapy for lymphoma. Her last session was one week prior at [facility] where she is getting Rituximab.   She has received 1 dose of rituximab for DLCL (1 week ago) and was scheduled for her second dose on .   CC: fever of 103 at home and chills  History goes back to: 1 day prior to admission when she started having chills and high grade fever.   She was at a barbecue with her relatives when she suddenly experienced these symptoms.   No exposure to COVID-19.    ROS is positive for: chronic urinary frequency  ROS is negative for: She reports no dysuria, no cough, no shortness of breath, no GI symptoms, no abdominal pain, no decrease PO intake.  Of note, patient had a UTI a few weeks ago and she completed a course of bactrim.   In the ED: Patient was hemodynamically stable, Meri=467.1  WBC=2, and plts=22; UA --> mildly positive     Hospital course:  Pt admitted for neutropenic fever, with Tmax of 102.2F. CXR does not show any focal consolidation. UA showed large leuk esterase, otherwise unremarkable. Blood cx negative. Urine cx showed <52811 normal urogenital karmen. Pt on cefepime and acyclovir. COVID-PCR negative on . ID consulted as well.     Interval history:  Per pt, she attended a BBQ on , where she had to hold her urine for several hours, and on monday she started having fever 103.9F associated with chills. Pt also had fever with chills last night and this AM. Pt denied any difficulty breathing, cough, chest pain. Pt complains of right sided abdominal pain that has been present for years, is episodic, non radiating, and is not associated with nausea/vomiting/diarrhea or constipation.  Pt had 1 episode of vomiting in the ER as well, which was non bloody and non bilious. Pt had an episode of loose stool this AM, which was non bloody (pt has had hx of bright red blood mixed with stool about 3 weeks ago).   Pt endorsed having increased urinary frequency but no dysuria or hesitancy.   Other than the bluish/black discoloration of b/l UE and LE, pt denied any other skin rash.     Hem/Onc History: Per pt, she is being treated for DLBCL and low platelets (?ITP) by Dr Michael Hernandez at [facility]. Pt received 8 weeks of weekly Rituxan 4 years ago for 'low platelets' and 'large lymph nodes'. Rituxan was started last week again for a course of 5 weeks this time.   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 Bone marrow biopsy showed monoclonal B cell lymphocytes. Pt also has history of immune neutropenia and immune thrombocytopenia.  Pt was treated with Rituxan 4 years ago, and was stable, but recently she had her plt count drop to 30s-40s and she was started on Prednisone with improvement in platelet count, and was started on Rituxan as well last week.   Per the oncologist, pt was supposed to taper the dose of prednisone but given her admission to the hospital, she likely stopped it abruptly.     PAST MEDICAL & SURGICAL HISTORY:  History of ITP  Overactive bladder  Low grade B cell lymphoma  S/P total knee arthroplasty: bilateral  Hx of colon polyps s/p removal  Hx of hysterectomy  Hx of cholecystectomy       SOCIAL HISTORY:  Lives alone and is independent with ADLs;  and has 6 children with good family support  Smoking: None  Alcohol: Occasional wine  Illicit drugs: None    ECO (capable of self care, but no work activities, out of bed >50% of the day)    FAMILY HISTORY:  Hx of malignant Colon Ca -  and brother (Last colonoscopy >10 yrs ago, normal per pt)   Hx of Brest malignancy- Sister (Last mammo  -normal)    Allergies  iodine (Unknown)  penicillins -Rash  Vicodin (Unknown)    ROS:  Negative except for:  fever  Chills  Right sided abdominal pain  loose stool x1; vomiting x1  Increased urinary frequency    Height (cm): 160 (20 @ 18:58)  Weight (kg): 73.6 (20 @ 18:58)  BMI (kg/m2): 28.7 (20 @ 18:58)  BSA (m2): 1.77 (20 @ 18:58)    Vital Signs Last 24 Hrs  T(C): 37.3 (2020 05:46), Max: 39 (2020 00:31)  T(F): 99.1 (2020 05:46), Max: 102.2 (2020 00:31)  HR: 97 (2020 05:46) (87 - 97)  BP: 112/55 (2020 05:46) (112/55 - 141/60)  RR: 18 (2020 05:46) (16 - 18)  SpO2: 98% (2020 18:58) (98% - 98%)    PHYSICAL EXAM  General: adult in NAD  HEENT: clear oropharynx, anicteric sclera, pink conjunctiva  Neck: supple  CV: normal S1/S2 with no murmur rubs or gallops  Lungs: positive air movement b/l ant lungs, clear to auscultation, no wheezes, no rales  Abdomen: soft non-tender non-distended, no hepatosplenomegaly  Ext: no clubbing cyanosis or edema  Skin: Black/blue discoloration present on b/l UE; Legs covered with SCDs.   Neuro: alert and oriented X 4, no focal deficits    MEDICATIONS:  HOME:  acyclovir 400 mg oral tablet: 1 tab(s) orally 2 times a day (2020 00:06)  Centrum Silver oral tablet, chewable: 1 tab(s) orally once a day (:06)  folic acid 1 mg oral tablet: 1 tab(s) orally once a day (2020 00:06)  latanoprost 0.005% ophthalmic solution: 1 drop(s) to each affected eye once a day (in the evening) (2020 00:06)  Omega-3 oral capsule: 1 cap(s) orally once a day (2020 00:06)  Vitamin D3 1000 intl units (25 mcg) oral tablet: 1000 unit(s) orally once a day (2020 00:06)    INPATIENT  acyclovir   Oral Tab/Cap 400 milliGRAM(s) Oral two times a day  cefepime   IVPB 2000 milliGRAM(s) IV Intermittent every 12 hours  cholecalciferol 1000 Unit(s) Oral daily  folic acid 1 milliGRAM(s) Oral daily  latanoprost 0.005% Ophthalmic Solution 1 Drop(s) Both EYES at bedtime  acetaminophen   Tablet .. 650 milliGRAM(s) Oral every 6 hours PRN Temp greater or equal to 38C (100.4F)      LABS:                          9.2    2.16  )-----------( 15       ( 2020 05:34 )             29.6         Mean Cell Volume : 87.3 fL  Mean Cell Hemoglobin : 27.1 pg  Mean Cell Hemoglobin Concentration : 31.1 g/dL  Auto Neutrophil # : 0.78 K/uL <--0.93<----2.35  Auto Lymphocyte # : 1.03 K/uL  Auto Monocyte # : 0.14 K/uL  Auto Eosinophil # : 0.03 K/uL  Auto Basophil # : 0.01 K/uL  Auto Neutrophil % : 36.0 %  Auto Lymphocyte % : 47.7 %  Auto Monocyte % : 6.5 %  Auto Eosinophil % : 1.4 %  Auto Basophil % : 0.5 %      Serial CBC's   @ 05:34  Hct-29.6 / Hgb-9.2 / Plat-15 / RBC-3.39 / WBC-2.16  Serial CBC's   @ 07:14  Hct-26.9 / Hgb-8.8 / Plat-17 / RBC-3.04 / WBC-1.92  Serial CBC's   @ 18:26  Hct-28.8 / Hgb-9.0 / Plat-22 / RBC-3.33 / WBC-2.63    PREVIOUS RECORDS in   WBC trend (2017) 5.19-->2.74-->2.90  Hb trend (2017) 9.7-->9.8-->10.3--->8.9  Platelet count (2017) 259-->179-->176-->135        138  |  103  |  17  ----------------------------<  107<H>  4.4   |  21  |  1.1    Ca    8.6      2020 05:34    TPro  5.6<L>  /  Alb  3.5  /  TBili  0.6  /  DBili  x   /  AST  43<H>  /  ALT  28  /  AlkPhos  85        PT/INR - ( 2020 07:14 )   PT: TNP sec;   INR: TNP ratio    PTT - ( 2020 07:14 )  PTT:tnp sec    Urinalysis Basic - ( 2020 20:09 )  Color: Yellow / Appearance: Clear / S.025 / pH: x  Gluc: x / Ketone: Negative  / Bili: Negative / Urobili: <2 mg/dL   Blood: x / Protein: Trace / Nitrite: Negative   Leuk Esterase: Large / RBC: 3 /HPF / WBC 11 /HPF   Sq Epi: x / Non Sq Epi: 3 /HPF / Bacteria: Negative      Culture - Urine (collected 2020 20:09)  Source: .Urine Clean Catch (Midstream)  Final Report (2020 21:16):    <10,000 CFU/mL Normal Urogenital Karmen    Culture - Blood (collected 2020 18:26)  Source: .Blood Blood  Preliminary Report (2020 02:00):    No growth to date.    Culture - Blood (collected 2020 18:26)  Source: .Blood Blood  Preliminary Report (2020 02:00):    No growth to date.    RADIOLOGY & ADDITIONAL STUDIES:    Xray Chest 1 View- PORTABLE-Urgent (20 @ 20:19) >    Impression:    No radiographic evidence of acute cardiopulmonary disease. Patient is a 89y old  Female who presents with a chief complaint of Fever and chills (2020 09:12)    REASON FOR CONSULT: Fever with neutropenia in the setting of DLBCL    HPI:  89 year old female with a PMhx of diffuse large cell lymphoma, ITP, cervical disk inflammation, urinary stress incontinence   Patient is on active chemotherapy for lymphoma. Her last session was one week prior at [facility] where she is getting Rituximab.   She has received 1 dose of rituximab for DLCL (1 week ago) and was scheduled for her second dose on .   CC: fever of 103 at home and chills  History goes back to: 1 day prior to admission when she started having chills and high grade fever.   She was at a barbecue with her relatives when she suddenly experienced these symptoms.   No exposure to COVID-19.    ROS is positive for: chronic urinary frequency  ROS is negative for: She reports no dysuria, no cough, no shortness of breath, no GI symptoms, no abdominal pain, no decrease PO intake.  Of note, patient had a UTI a few weeks ago and she completed a course of bactrim.   In the ED: Patient was hemodynamically stable, Hbbr=160.1  WBC=2, and plts=22; UA --> mildly positive     Hospital course:  Pt admitted for neutropenic fever, with Tmax of 102.2F. CXR does not show any focal consolidation. UA showed large leuk esterase, otherwise unremarkable. Blood cx negative. Urine cx showed <[id number] normal urogenital karmen. Pt on cefepime and acyclovir. COVID-PCR negative on . ID consulted as well.     Interval history:  Per pt, she attended a BBQ on , where she had to hold her urine for several hours, and on monday she started having fever 103.9F associated with chills. Pt also had fever with chills last night and this AM. Pt denied any difficulty breathing, cough, chest pain. Pt complains of right sided abdominal pain that has been present for years, is episodic, non radiating, and is not associated with nausea/vomiting/diarrhea or constipation.  Pt had 1 episode of vomiting in the ER as well, which was non bloody and non bilious. Pt had an episode of loose stool this AM, which was non bloody (pt has had hx of bright red blood mixed with stool about 3 weeks ago).   Pt endorsed having increased urinary frequency but no dysuria or hesitancy.   Pt denied any recent fall, bleeding, any family hx of bleeding disorders or any new medications.  Other than the bluish/black discoloration of b/l UE and LE, pt denied any other skin rash.     Hem/Onc History: Per pt, she is being treated for DLBCL and low platelets (?ITP) by Dr Michael Hernandez at [facility]. Pt received 8 weeks of weekly Rituxan 4 years ago for 'low platelets' and 'large lymph nodes'. Rituxan was started last week again for a course of 5 weeks this time.   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 Bone marrow biopsy showed monoclonal B cell lymphocytes. Pt also has history of immune neutropenia and immune thrombocytopenia.  Pt was treated with Rituxan 4 years ago, and was stable, but recently she had her plt count drop to 30s-40s and she was started on Prednisone with improvement in platelet count, and was started on Rituxan as well last week.   Per the oncologist, pt was supposed to taper the dose of prednisone but given her admission to the hospital, she likely stopped it abruptly.   Unclear if pt was ever tested for HIV, Hepatitis panel, KEITH, Vit B12 or folate. Given pt was on Rituxan, pt should have been likely tested for Hep B.    PAST MEDICAL & SURGICAL HISTORY:  History of ITP  Overactive bladder  Low grade B cell lymphoma  S/P total knee arthroplasty: bilateral  Hx of colon polyps s/p removal  Hx of hysterectomy  Hx of cholecystectomy       SOCIAL HISTORY:  Lives alone and is independent with ADLs;  and has 6 children with good family support  Smoking: None  Alcohol: Occasional wine  Illicit drugs: None    ECO (capable of self care, but no work activities, out of bed >50% of the day)    FAMILY HISTORY:  Hx of malignant Colon Ca -  and brother (Last colonoscopy >10 yrs ago, normal per pt)   Hx of Brest malignancy- Sister (Last mammo  -normal)    Allergies  iodine (Unknown)  penicillins -Rash  Vicodin (Unknown)    ROS:  Negative except for:  fever  Chills  Right sided abdominal pain  loose stool x1; vomiting x1  Increased urinary frequency    Height (cm): 160 (20 @ 18:58)  Weight (kg): 73.6 (20 @ 18:58)  BMI (kg/m2): 28.7 (20 @ 18:58)  BSA (m2): 1.77 (20 @ 18:58)    Vital Signs Last 24 Hrs  T(C): 37.3 (2020 05:46), Max: 39 (2020 00:31)  T(F): 99.1 (2020 05:46), Max: 102.2 (2020 00:31)  HR: 97 (2020 05:46) (87 - 97)  BP: 112/55 (2020 05:46) (112/55 - 141/60)  RR: 18 (2020 05:46) (16 - 18)  SpO2: 98% (2020 18:58) (98% - 98%)    PHYSICAL EXAM  General: adult in NAD  HEENT: clear oropharynx, anicteric sclera, pink conjunctiva  Neck: supple  CV: normal S1/S2 with no murmur rubs or gallops  Lungs: positive air movement b/l ant lungs, clear to auscultation, no wheezes, no rales  Abdomen: soft non-tender non-distended, no hepatosplenomegaly  Ext: no clubbing cyanosis or edema  Skin: Black/blue discoloration present on b/l UE; Legs covered with SCDs.   Neuro: alert and oriented X 4, no focal deficits    MEDICATIONS:  HOME:  acyclovir 400 mg oral tablet: 1 tab(s) orally 2 times a day (2020 00:06)  Centrum Silver oral tablet, chewable: 1 tab(s) orally once a day (2020 00:06)  folic acid 1 mg oral tablet: 1 tab(s) orally once a day (2020 00:06)  latanoprost 0.005% ophthalmic solution: 1 drop(s) to each affected eye once a day (in the evening) (:06)  Omega-3 oral capsule: 1 cap(s) orally once a day (2020 00:06)  Vitamin D3 1000 intl units (25 mcg) oral tablet: 1000 unit(s) orally once a day (2020 00:06)    INPATIENT  acyclovir   Oral Tab/Cap 400 milliGRAM(s) Oral two times a day  cefepime   IVPB 2000 milliGRAM(s) IV Intermittent every 12 hours  cholecalciferol 1000 Unit(s) Oral daily  folic acid 1 milliGRAM(s) Oral daily  latanoprost 0.005% Ophthalmic Solution 1 Drop(s) Both EYES at bedtime  acetaminophen   Tablet .. 650 milliGRAM(s) Oral every 6 hours PRN Temp greater or equal to 38C (100.4F)      LABS:                          9.2    2.16  )-----------( 15       ( 2020 05:34 )             29.6         Mean Cell Volume : 87.3 fL  Mean Cell Hemoglobin : 27.1 pg  Mean Cell Hemoglobin Concentration : 31.1 g/dL  Auto Neutrophil # : 0.78 K/uL <--0.93<----2.35  Auto Lymphocyte # : 1.03 K/uL  Auto Monocyte # : 0.14 K/uL  Auto Eosinophil # : 0.03 K/uL  Auto Basophil # : 0.01 K/uL  Auto Neutrophil % : 36.0 %  Auto Lymphocyte % : 47.7 %  Auto Monocyte % : 6.5 %  Auto Eosinophil % : 1.4 %  Auto Basophil % : 0.5 %      Serial CBC's   @ 05:34  Hct-29.6 / Hgb-9.2 / Plat-15 / RBC-3.39 / WBC-2.16  Serial CBC's   @ 07:14  Hct-26.9 / Hgb-8.8 / Plat-17 / RBC-3.04 / WBC-1.92  Serial CBC's   @ 18:26  Hct-28.8 / Hgb-9.0 / Plat-22 / RBC-3.33 / WBC-2.63    PREVIOUS RECORDS in   WBC trend (2017) 5.19-->2.74-->2.90  Hb trend (2017) 9.7-->9.8-->10.3--->8.9  Platelet count (2017) 259-->179-->176-->135        138  |  103  |  17  ----------------------------<  107<H>  4.4   |  21  |  1.1    Ca    8.6      2020 05:34    TPro  5.6<L>  /  Alb  3.5  /  TBili  0.6  /  DBili  x   /  AST  43<H>  /  ALT  28  /  AlkPhos  85  06-24      PT/INR - ( 2020 07:14 )   PT: TNP sec;   INR: TNP ratio    PTT - ( 2020 07:14 )  PTT:tnp sec    Urinalysis Basic - ( 2020 20:09 )  Color: Yellow / Appearance: Clear / S.025 / pH: x  Gluc: x / Ketone: Negative  / Bili: Negative / Urobili: <2 mg/dL   Blood: x / Protein: Trace / Nitrite: Negative   Leuk Esterase: Large / RBC: 3 /HPF / WBC 11 /HPF   Sq Epi: x / Non Sq Epi: 3 /HPF / Bacteria: Negative      Culture - Urine (collected 2020 20:09)  Source: .Urine Clean Catch (Midstream)  Final Report (2020 21:16):    <10,000 CFU/mL Normal Urogenital Karmen    Culture - Blood (collected 2020 18:26)  Source: .Blood Blood  Preliminary Report (2020 02:00):    No growth to date.    Culture - Blood (collected 2020 18:26)  Source: .Blood Blood  Preliminary Report (2020 02:00):    No growth to date.    RADIOLOGY & ADDITIONAL STUDIES:    Xray Chest 1 View- PORTABLE-Urgent (20 @ 20:19) >    Impression:    No radiographic evidence of acute cardiopulmonary disease. Patient is a 89y old  Female who presents with a chief complaint of Fever and chills (2020 09:12)    REASON FOR CONSULT: Fever with neutropenia in the setting of DLBCL    HPI:  89 year old female with a PMhx of diffuse large cell lymphoma, ITP, cervical disk inflammation, urinary stress incontinence   Patient is on active chemotherapy for lymphoma. Her last session was one week prior at [facility] where she is getting Rituximab.   She has received 1 dose of rituximab for DLCL (1 week ago) and was scheduled for her second dose on .   CC: fever of 103 at home and chills  History goes back to: 1 day prior to admission when she started having chills and high grade fever.   She was at a barbecue with her relatives when she suddenly experienced these symptoms.   No exposure to COVID-19.    ROS is positive for: chronic urinary frequency  ROS is negative for: She reports no dysuria, no cough, no shortness of breath, no GI symptoms, no abdominal pain, no decrease PO intake.  Of note, patient had a UTI a few weeks ago and she completed a course of bactrim.   In the ED: Patient was hemodynamically stable, Wibf=041.1  WBC=2, and plts=22; UA --> mildly positive     Hospital course:  Pt admitted for neutropenic fever, with Tmax of 102.2F. CXR does not show any focal consolidation. UA showed large leuk esterase, otherwise unremarkable. Blood cx negative. Urine cx showed <86058 normal urogenital karmen. Pt on cefepime and acyclovir. COVID-PCR negative on . ID consulted as well.     Interval history:  Per pt, she attended a BBQ on , where she had to hold her urine for several hours, and on monday she started having fever 103.9F associated with chills. Pt also had fever with chills last night and this AM. Pt denied any difficulty breathing, cough, chest pain. Pt complains of right sided abdominal pain that has been present for years, is episodic, non radiating, and is not associated with nausea/vomiting/diarrhea or constipation.  Pt had 1 episode of vomiting in the ER as well, which was non bloody and non bilious. Pt had an episode of loose stool this AM, which was non bloody (pt has had hx of bright red blood mixed with stool about 3 weeks ago).   Pt endorsed having increased urinary frequency but no dysuria or hesitancy.   Pt denied any recent fall, bleeding, any family hx of bleeding disorders or any new medications.  Other than the bluish/black discoloration of b/l UE and LE, pt denied any other skin rash.     Hem/Onc History: Per pt, she is being treated for DLBCL and low platelets (?ITP) by Dr Michael Hernandez at [facility]. Pt received 8 weeks of weekly Rituxan 4 years ago for 'low platelets' and 'large lymph nodes'. Rituxan was started last week again for a course of 5 weeks this time.   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 Bone marrow biopsy showed monoclonal B cell lymphocytes. Pt also has history of immune neutropenia and immune thrombocytopenia.  Pt was treated with Rituxan 4 years ago, and was stable, but recently she had her plt count drop to 30s-40s and she was started on Prednisone with improvement in platelet count, and was started on Rituxan as well last week.   Per the oncologist, pt was supposed to taper the dose of prednisone but given her admission to the hospital, she likely stopped it abruptly.   Unclear if pt was ever tested for HIV, Hepatitis panel, KEITH, Vit B12 or folate. Given pt was on Rituxan, pt should have been likely tested for Hep B.    PAST MEDICAL & SURGICAL HISTORY:  History of ITP  Overactive bladder  Low grade B cell lymphoma  S/P total knee arthroplasty: bilateral  Hx of colon polyps s/p removal  Hx of hysterectomy  Hx of cholecystectomy       SOCIAL HISTORY:  Lives alone and is independent with ADLs;  and has 6 children with good family support  Smoking: None  Alcohol: Occasional wine  Illicit drugs: None    ECO (capable of self care, but no work activities, out of bed >50% of the day)    FAMILY HISTORY:  Hx of malignant Colon Ca -  and brother (Last colonoscopy >10 yrs ago, normal per pt)   Hx of Brest malignancy- Sister (Last mammo  -normal)    Allergies  iodine (Unknown)  penicillins -Rash  Vicodin (Unknown)    ROS:  Negative except for:  fever  Chills  Right sided abdominal pain  loose stool x1; vomiting x1  Increased urinary frequency    Height (cm): 160 (20 @ 18:58)  Weight (kg): 73.6 (20 @ 18:58)  BMI (kg/m2): 28.7 (20 @ 18:58)  BSA (m2): 1.77 (20 @ 18:58)    Vital Signs Last 24 Hrs  T(C): 37.3 (2020 05:46), Max: 39 (2020 00:31)  T(F): 99.1 (2020 05:46), Max: 102.2 (2020 00:31)  HR: 97 (2020 05:46) (87 - 97)  BP: 112/55 (2020 05:46) (112/55 - 141/60)  RR: 18 (2020 05:46) (16 - 18)  SpO2: 98% (2020 18:58) (98% - 98%)    PHYSICAL EXAM  General: adult in NAD  HEENT: clear oropharynx, anicteric sclera, pink conjunctiva  Neck: supple  CV: normal S1/S2 with no murmur rubs or gallops  Lungs: positive air movement b/l ant lungs, clear to auscultation, no wheezes, no rales  Abdomen: soft non-tender non-distended, no hepatosplenomegaly  Ext: no clubbing cyanosis or edema  Skin: Black/blue discoloration present on b/l UE; Legs covered with SCDs.   Neuro: alert and oriented X 4, no focal deficits    MEDICATIONS:  HOME:  acyclovir 400 mg oral tablet: 1 tab(s) orally 2 times a day (2020 00:06)  Centrum Silver oral tablet, chewable: 1 tab(s) orally once a day (2020 00:06)  folic acid 1 mg oral tablet: 1 tab(s) orally once a day (2020 00:06)  latanoprost 0.005% ophthalmic solution: 1 drop(s) to each affected eye once a day (in the evening) (:06)  Omega-3 oral capsule: 1 cap(s) orally once a day (2020 00:06)  Vitamin D3 1000 intl units (25 mcg) oral tablet: 1000 unit(s) orally once a day (2020 00:06)    INPATIENT  acyclovir   Oral Tab/Cap 400 milliGRAM(s) Oral two times a day  cefepime   IVPB 2000 milliGRAM(s) IV Intermittent every 12 hours  cholecalciferol 1000 Unit(s) Oral daily  folic acid 1 milliGRAM(s) Oral daily  latanoprost 0.005% Ophthalmic Solution 1 Drop(s) Both EYES at bedtime  acetaminophen   Tablet .. 650 milliGRAM(s) Oral every 6 hours PRN Temp greater or equal to 38C (100.4F)      LABS:                          9.2    2.16  )-----------( 15       ( 2020 05:34 )             29.6         Mean Cell Volume : 87.3 fL  Mean Cell Hemoglobin : 27.1 pg  Mean Cell Hemoglobin Concentration : 31.1 g/dL  Auto Neutrophil # : 0.78 K/uL <--0.93<----2.35  Auto Lymphocyte # : 1.03 K/uL  Auto Monocyte # : 0.14 K/uL  Auto Eosinophil # : 0.03 K/uL  Auto Basophil # : 0.01 K/uL  Auto Neutrophil % : 36.0 %  Auto Lymphocyte % : 47.7 %  Auto Monocyte % : 6.5 %  Auto Eosinophil % : 1.4 %  Auto Basophil % : 0.5 %      Serial CBC's   @ 05:34  Hct-29.6 / Hgb-9.2 / Plat-15 / RBC-3.39 / WBC-2.16  Serial CBC's   @ 07:14  Hct-26.9 / Hgb-8.8 / Plat-17 / RBC-3.04 / WBC-1.92  Serial CBC's   @ 18:26  Hct-28.8 / Hgb-9.0 / Plat-22 / RBC-3.33 / WBC-2.63    PREVIOUS RECORDS in   WBC trend (2017) 5.19-->2.74-->2.90  Hb trend (2017) 9.7-->9.8-->10.3--->8.9  Platelet count (2017) 259-->179-->176-->135        138  |  103  |  17  ----------------------------<  107<H>  4.4   |  21  |  1.1    Ca    8.6      2020 05:34    TPro  5.6<L>  /  Alb  3.5  /  TBili  0.6  /  DBili  x   /  AST  43<H>  /  ALT  28  /  AlkPhos  85  06-24      PT/INR - ( 2020 07:14 )   PT: TNP sec;   INR: TNP ratio    PTT - ( 2020 07:14 )  PTT:tnp sec    Lactate Dehydrogenase, Serum: 358 (20 @ 05:34)  Lactate, Blood: 1.7 mmol/L (20 @ 22:03)    Urinalysis Basic - ( 2020 20:09 )  Color: Yellow / Appearance: Clear / S.025 / pH: x  Gluc: x / Ketone: Negative  / Bili: Negative / Urobili: <2 mg/dL   Blood: x / Protein: Trace / Nitrite: Negative   Leuk Esterase: Large / RBC: 3 /HPF / WBC 11 /HPF   Sq Epi: x / Non Sq Epi: 3 /HPF / Bacteria: Negative      Culture - Urine (collected 2020 20:09)  Source: .Urine Clean Catch (Midstream)  Final Report (2020 21:16):    <10,000 CFU/mL Normal Urogenital Karmen    Culture - Blood (collected 2020 18:26)  Source: .Blood Blood  Preliminary Report (2020 02:00):    No growth to date.    Culture - Blood (collected 2020 18:26)  Source: .Blood Blood  Preliminary Report (2020 02:00):    No growth to date.    RADIOLOGY & ADDITIONAL STUDIES:    Xray Chest 1 View- PORTABLE-Urgent (20 @ 20:19) >    Impression:    No radiographic evidence of acute cardiopulmonary disease. Patient is a 89y old  Female who presents with a chief complaint of Fever and chills (2020 09:12)    REASON FOR CONSULT: Fever with neutropenia in the setting of DLBCL    HPI:  89 year old female with a PMhx of diffuse large cell lymphoma, ITP, cervical disk inflammation, urinary stress incontinence   Patient is on active chemotherapy for lymphoma. Her last session was one week prior at [facility] where she is getting Rituximab.   She has received 1 dose of rituximab for DLCL (1 week ago) and was scheduled for her second dose on .   CC: fever of 103 at home and chills  History goes back to: 1 day prior to admission when she started having chills and high grade fever.   She was at a barbecue with her relatives when she suddenly experienced these symptoms.   No exposure to COVID-19.    ROS is positive for: chronic urinary frequency  ROS is negative for: She reports no dysuria, no cough, no shortness of breath, no GI symptoms, no abdominal pain, no decrease PO intake.  Of note, patient had a UTI a few weeks ago and she completed a course of bactrim.   In the ED: Patient was hemodynamically stable, Afzk=295.1  WBC=2, and plts=22; UA --> mildly positive     Hospital course:  Pt admitted for neutropenic fever, with Tmax of 102.2F. CXR does not show any focal consolidation. UA showed large leuk esterase, otherwise unremarkable. Blood cx negative. Urine cx showed <87154 normal urogenital karmen. Pt on cefepime and acyclovir. COVID-PCR negative on . ID consulted as well.     Interval history:  Per pt, she attended a BBQ on , where she had to hold her urine for several hours, and on monday she started having fever 103.9F associated with chills. Pt also had fever with chills last night and this AM. Pt denied any difficulty breathing, cough, chest pain. Pt complains of right sided abdominal pain that has been present for years, is episodic, non radiating, and is not associated with nausea/vomiting/diarrhea or constipation.  Pt had 1 episode of vomiting in the ER as well, which was non bloody and non bilious. Pt had an episode of loose stool this AM, which was non bloody (pt has had hx of bright red blood mixed with stool about 3 weeks ago).   Pt endorsed having increased urinary frequency but no dysuria or hesitancy.   Pt denied any recent fall, bleeding, any family hx of bleeding disorders or any new medications.  Other than the bluish/black discoloration of b/l UE and LE, pt denied any other skin rash.     Hem/Onc History: Per pt, she is being treated for DLBCL and low platelets (?ITP) by Dr Michael Hernandez at [facility]. Pt received 8 weeks of weekly Rituxan 2 years ago for 'low platelets' and 'large lymph nodes'. Rituxan was started last week again for a course of 5 weeks this time.   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 Bone marrow biopsy showed monoclonal B cell lymphocytes. Pt also has history of immune neutropenia and immune thrombocytopenia.  Pt was treated with Rituxan 2 years ago, and was stable, but recently she had her plt count drop to 30s-40s and she was started on Prednisone with improvement in platelet count, and was started on Rituxan as well last week.   Per the oncologist, pt was supposed to taper the dose of prednisone but given her admission to the hospital, she likely stopped it abruptly, but the family says that she finished the taper.   Unclear if pt was ever tested for HIV, Hepatitis panel, KEITH, Vit B12 or folate. Given pt was on Rituxan, pt should have been likely tested for Hep B.    PAST MEDICAL & SURGICAL HISTORY:  History of ITP  Overactive bladder  Low grade B cell lymphoma  S/P total knee arthroplasty: bilateral  Hx of colon polyps s/p removal  Hx of hysterectomy  Hx of cholecystectomy       SOCIAL HISTORY:  Lives alone and is independent with ADLs;  and has 6 children with good family support  Smoking: None  Alcohol: Occasional wine  Illicit drugs: None    ECO (capable of self care, but no work activities, out of bed >50% of the day)    FAMILY HISTORY:  Hx of malignant Colon Ca -  and brother (Last colonoscopy >10 yrs ago, normal per pt)   Hx of Brest malignancy- Sister (Last mammo 2018 -normal)    Allergies  iodine (Unknown)  penicillins -Rash  Vicodin (Unknown)    ROS:  Negative except for:  fever  Chills  Right sided abdominal pain  loose stool x1; vomiting x1  Increased urinary frequency    Height (cm): 160 (20 @ 18:58)  Weight (kg): 73.6 (20 @ 18:58)  BMI (kg/m2): 28.7 (20 @ 18:58)  BSA (m2): 1.77 (20 @ 18:58)    Vital Signs Last 24 Hrs  T(C): 37.3 (2020 05:46), Max: 39 (2020 00:31)  T(F): 99.1 (2020 05:46), Max: 102.2 (2020 00:31)  HR: 97 (2020 05:46) (87 - 97)  BP: 112/55 (2020 05:46) (112/55 - 141/60)  RR: 18 (2020 05:46) (16 - 18)  SpO2: 98% (2020 18:58) (98% - 98%)    PHYSICAL EXAM  General: adult in NAD  HEENT: clear oropharynx, anicteric sclera, pink conjunctiva  Neck: supple  CV: normal S1/S2 with no murmur rubs or gallops  Lungs: positive air movement b/l ant lungs, clear to auscultation, no wheezes, no rales  Abdomen: soft non-tender non-distended, no hepatosplenomegaly  Ext: no clubbing cyanosis or edema  Skin: Black/blue discoloration present on b/l UE; Legs covered with SCDs.   Neuro: alert and oriented X 4, no focal deficits    MEDICATIONS:  HOME:  acyclovir 400 mg oral tablet: 1 tab(s) orally 2 times a day (:06)  Centrum Silver oral tablet, chewable: 1 tab(s) orally once a day (2020 00:06)  folic acid 1 mg oral tablet: 1 tab(s) orally once a day (:06)  latanoprost 0.005% ophthalmic solution: 1 drop(s) to each affected eye once a day (in the evening) (:06)  Omega-3 oral capsule: 1 cap(s) orally once a day (2020 00:06)  Vitamin D3 1000 intl units (25 mcg) oral tablet: 1000 unit(s) orally once a day (:06)    INPATIENT  acyclovir   Oral Tab/Cap 400 milliGRAM(s) Oral two times a day  cefepime   IVPB 2000 milliGRAM(s) IV Intermittent every 12 hours  cholecalciferol 1000 Unit(s) Oral daily  folic acid 1 milliGRAM(s) Oral daily  latanoprost 0.005% Ophthalmic Solution 1 Drop(s) Both EYES at bedtime  acetaminophen   Tablet .. 650 milliGRAM(s) Oral every 6 hours PRN Temp greater or equal to 38C (100.4F)      LABS:                          9.2    2.16  )-----------( 15       ( 2020 05:34 )             29.6         Mean Cell Volume : 87.3 fL  Mean Cell Hemoglobin : 27.1 pg  Mean Cell Hemoglobin Concentration : 31.1 g/dL  Auto Neutrophil # : 0.78 K/uL <--0.93<----2.35  Auto Lymphocyte # : 1.03 K/uL  Auto Monocyte # : 0.14 K/uL  Auto Eosinophil # : 0.03 K/uL  Auto Basophil # : 0.01 K/uL  Auto Neutrophil % : 36.0 %  Auto Lymphocyte % : 47.7 %  Auto Monocyte % : 6.5 %  Auto Eosinophil % : 1.4 %  Auto Basophil % : 0.5 %      Serial CBC's   @ 05:34  Hct-29.6 / Hgb-9.2 / Plat-15 / RBC-3.39 / WBC-2.16  Serial CBC's   @ 07:14  Hct-26.9 / Hgb-8.8 / Plat-17 / RBC-3.04 / WBC-1.92  Serial CBC's   @ 18:26  Hct-28.8 / Hgb-9.0 / Plat-22 / RBC-3.33 / WBC-2.63    PREVIOUS RECORDS in   WBC trend (2017) 5.19-->2.74-->2.90  Hb trend (2017) 9.7-->9.8-->10.3--->8.9  Platelet count (2017) 259-->179-->176-->135        138  |  103  |  17  ----------------------------<  107<H>  4.4   |  21  |  1.1    Ca    8.6      2020 05:34    TPro  5.6<L>  /  Alb  3.5  /  TBili  0.6  /  DBili  x   /  AST  43<H>  /  ALT  28  /  AlkPhos  85  06-24      PT/INR - ( 2020 07:14 )   PT: TNP sec;   INR: TNP ratio    PTT - ( 2020 07:14 )  PTT:tnp sec    Lactate Dehydrogenase, Serum: 358 (20 @ 05:34)  Lactate, Blood: 1.7 mmol/L (20 @ 22:03)    Urinalysis Basic - ( 2020 20:09 )  Color: Yellow / Appearance: Clear / S.025 / pH: x  Gluc: x / Ketone: Negative  / Bili: Negative / Urobili: <2 mg/dL   Blood: x / Protein: Trace / Nitrite: Negative   Leuk Esterase: Large / RBC: 3 /HPF / WBC 11 /HPF   Sq Epi: x / Non Sq Epi: 3 /HPF / Bacteria: Negative      Culture - Urine (collected 2020 20:09)  Source: .Urine Clean Catch (Midstream)  Final Report (2020 21:16):    <10,000 CFU/mL Normal Urogenital Karmen    Culture - Blood (collected 2020 18:26)  Source: .Blood Blood  Preliminary Report (2020 02:00):    No growth to date.    Culture - Blood (collected 2020 18:26)  Source: .Blood Blood  Preliminary Report (2020 02:00):    No growth to date.    RADIOLOGY & ADDITIONAL STUDIES:    Xray Chest 1 View- PORTABLE-Urgent (20 @ 20:19) >    Impression:    No radiographic evidence of acute cardiopulmonary disease. Patient is a 89y old  Female who presents with a chief complaint of Fever and chills (2020 09:12)    REASON FOR CONSULT: Fever with neutropenia in the setting of low grade B cell lymphoma    HPI:  89 year old female with a PMhx of diffuse large cell lymphoma, ITP, cervical disk inflammation, urinary stress incontinence   Patient is on active chemotherapy for lymphoma. Her last session was one week prior at [facility] where she is getting Rituximab.   She has received 1 dose of rituximab for DLCL (1 week ago) and was scheduled for her second dose on .   CC: fever of 103 at home and chills  History goes back to: 1 day prior to admission when she started having chills and high grade fever.   She was at a barbecue with her relatives when she suddenly experienced these symptoms.   No exposure to COVID-19.    ROS is positive for: chronic urinary frequency  ROS is negative for: She reports no dysuria, no cough, no shortness of breath, no GI symptoms, no abdominal pain, no decrease PO intake.  Of note, patient had a UTI a few weeks ago and she completed a course of bactrim.   In the ED: Patient was hemodynamically stable, Nssm=309.1  WBC=2, and plts=22; UA --> mildly positive     Hospital course:  Pt admitted for neutropenic fever, with Tmax of 102.2F. CXR does not show any focal consolidation. UA showed large leuk esterase, otherwise unremarkable. Blood cx negative. Urine cx showed <[id number] normal urogenital karmen. Pt on cefepime and acyclovir. COVID-PCR negative on . ID consulted as well.     Interval history:  Per pt, she attended a BBQ on , where she had to hold her urine for several hours, and on monday she started having fever 103.9F associated with chills. Pt also had fever with chills last night and this AM. Pt denied any difficulty breathing, cough, chest pain. Pt complains of right sided abdominal pain that has been present for years, is episodic, non radiating, and is not associated with nausea/vomiting/diarrhea or constipation.  Pt had 1 episode of vomiting in the ER as well, which was non bloody and non bilious. Pt had an episode of loose stool this AM, which was non bloody (pt has had hx of bright red blood mixed with stool about 3 weeks ago).   Pt endorsed having increased urinary frequency but no dysuria or hesitancy.   Pt denied any recent fall, bleeding, any family hx of bleeding disorders or any new medications.  Other than the bluish/black discoloration of b/l UE and LE, pt denied any other skin rash.     Hem/Onc History: Per pt, she is being treated for DLBCL and low platelets (?ITP) by Dr Michael Hernandez at [facility]. Pt received 8 weeks of weekly Rituxan 2 years ago for 'low platelets' and 'large lymph nodes'. Rituxan was started last week again for a course of 5 weeks this time.   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 Bone marrow biopsy showed monoclonal B cell lymphocytes. Pt also has history of immune neutropenia and immune thrombocytopenia.  Pt was treated with Rituxan 2 years ago, and was stable, but recently she had her plt count drop to 30s-40s and she was started on Prednisone with improvement in platelet count, and was started on Rituxan as well last week.   Per the oncologist, pt was supposed to taper the dose of prednisone but given her admission to the hospital, she likely stopped it abruptly, but the family says that she finished the taper.   Unclear if pt was ever tested for HIV, Hepatitis panel, KEITH, Vit B12 or folate. Given pt was on Rituxan, pt should have been likely tested for Hep B.    PAST MEDICAL & SURGICAL HISTORY:  History of ITP  Overactive bladder  Low grade B cell lymphoma  S/P total knee arthroplasty: bilateral  Hx of colon polyps s/p removal  Hx of hysterectomy  Hx of cholecystectomy       SOCIAL HISTORY:  Lives alone and is independent with ADLs;  and has 6 children with good family support  Smoking: None  Alcohol: Occasional wine  Illicit drugs: None    ECO (capable of self care, but no work activities, out of bed >50% of the day)    FAMILY HISTORY:  Hx of malignant Colon Ca -  and brother (Last colonoscopy >10 yrs ago, normal per pt)   Hx of Brest malignancy- Sister (Last mammo 2018 -normal)    Allergies  iodine (Unknown)  penicillins -Rash  Vicodin (Unknown)    ROS:  Negative except for:  fever  Chills  Right sided abdominal pain  loose stool x1; vomiting x1  Increased urinary frequency    Height (cm): 160 (06-23-20 @ 18:58)  Weight (kg): 73.6 (20 @ 18:58)  BMI (kg/m2): 28.7 (20 @ 18:58)  BSA (m2): 1.77 (20 @ 18:58)    Vital Signs Last 24 Hrs  T(C): 37.3 (2020 05:46), Max: 39 (2020 00:31)  T(F): 99.1 (2020 05:46), Max: 102.2 (2020 00:31)  HR: 97 (2020 05:46) (87 - 97)  BP: 112/55 (2020 05:46) (112/55 - 141/60)  RR: 18 (2020 05:46) (16 - 18)  SpO2: 98% (2020 18:58) (98% - 98%)    PHYSICAL EXAM  General: adult in NAD  HEENT: clear oropharynx, anicteric sclera, pink conjunctiva  Neck: supple  CV: normal S1/S2 with no murmur rubs or gallops  Lungs: positive air movement b/l ant lungs, clear to auscultation, no wheezes, no rales  Abdomen: soft non-tender non-distended, no hepatosplenomegaly  Ext: no clubbing cyanosis or edema  Skin: Black/blue discoloration present on b/l UE; Legs covered with SCDs.   Neuro: alert and oriented X 4, no focal deficits    MEDICATIONS:  HOME:  acyclovir 400 mg oral tablet: 1 tab(s) orally 2 times a day (:06)  Centrum Silver oral tablet, chewable: 1 tab(s) orally once a day (2020 00:06)  folic acid 1 mg oral tablet: 1 tab(s) orally once a day (2020 00:06)  latanoprost 0.005% ophthalmic solution: 1 drop(s) to each affected eye once a day (in the evening) (:06)  Omega-3 oral capsule: 1 cap(s) orally once a day (2020 00:06)  Vitamin D3 1000 intl units (25 mcg) oral tablet: 1000 unit(s) orally once a day (2020 00:06)    INPATIENT  acyclovir   Oral Tab/Cap 400 milliGRAM(s) Oral two times a day  cefepime   IVPB 2000 milliGRAM(s) IV Intermittent every 12 hours  cholecalciferol 1000 Unit(s) Oral daily  folic acid 1 milliGRAM(s) Oral daily  latanoprost 0.005% Ophthalmic Solution 1 Drop(s) Both EYES at bedtime  acetaminophen   Tablet .. 650 milliGRAM(s) Oral every 6 hours PRN Temp greater or equal to 38C (100.4F)      LABS:                          9.2    2.16  )-----------( 15       ( 2020 05:34 )             29.6         Mean Cell Volume : 87.3 fL  Mean Cell Hemoglobin : 27.1 pg  Mean Cell Hemoglobin Concentration : 31.1 g/dL  Auto Neutrophil # : 0.78 K/uL <--0.93<----2.35  Auto Lymphocyte # : 1.03 K/uL  Auto Monocyte # : 0.14 K/uL  Auto Eosinophil # : 0.03 K/uL  Auto Basophil # : 0.01 K/uL  Auto Neutrophil % : 36.0 %  Auto Lymphocyte % : 47.7 %  Auto Monocyte % : 6.5 %  Auto Eosinophil % : 1.4 %  Auto Basophil % : 0.5 %      Serial CBC's   @ 05:34  Hct-29.6 / Hgb-9.2 / Plat-15 / RBC-3.39 / WBC-2.16  Serial CBC's   @ 07:14  Hct-26.9 / Hgb-8.8 / Plat-17 / RBC-3.04 / WBC-1.92  Serial CBC's   @ 18:26  Hct-28.8 / Hgb-9.0 / Plat-22 / RBC-3.33 / WBC-2.63    PREVIOUS RECORDS in 2017  WBC trend (2017) 5.19-->2.74-->2.90  Hb trend (2017) 9.7-->9.8-->10.3--->8.9  Platelet count (2017) 259-->179-->176-->135        138  |  103  |  17  ----------------------------<  107<H>  4.4   |  21  |  1.1    Ca    8.6      2020 05:34    TPro  5.6<L>  /  Alb  3.5  /  TBili  0.6  /  DBili  x   /  AST  43<H>  /  ALT  28  /  AlkPhos  85  06-24      PT/INR - ( 2020 07:14 )   PT: TNP sec;   INR: TNP ratio    PTT - ( 2020 07:14 )  PTT:tnp sec    Lactate Dehydrogenase, Serum: 358 (20 @ 05:34)  Lactate, Blood: 1.7 mmol/L (20 @ 22:03)    Urinalysis Basic - ( 2020 20:09 )  Color: Yellow / Appearance: Clear / S.025 / pH: x  Gluc: x / Ketone: Negative  / Bili: Negative / Urobili: <2 mg/dL   Blood: x / Protein: Trace / Nitrite: Negative   Leuk Esterase: Large / RBC: 3 /HPF / WBC 11 /HPF   Sq Epi: x / Non Sq Epi: 3 /HPF / Bacteria: Negative      Culture - Urine (collected 2020 20:09)  Source: .Urine Clean Catch (Midstream)  Final Report (2020 21:16):    <10,000 CFU/mL Normal Urogenital Karmen    Culture - Blood (collected 2020 18:26)  Source: .Blood Blood  Preliminary Report (2020 02:00):    No growth to date.    Culture - Blood (collected 2020 18:26)  Source: .Blood Blood  Preliminary Report (2020 02:00):    No growth to date.    RADIOLOGY & ADDITIONAL STUDIES:    Xray Chest 1 View- PORTABLE-Urgent (20 @ 20:19) >    Impression:    No radiographic evidence of acute cardiopulmonary disease. Patient is a 89y old  Female who presents with a chief complaint of Fever and chills (2020 09:12)    REASON FOR CONSULT: Fever with neutropenia in the setting of low grade B cell lymphoma    HPI:  89 year old female with a PMhx of diffuse large cell lymphoma, ITP, cervical disk inflammation, urinary stress incontinence   Patient is on active chemotherapy for lymphoma. Her last session was one week prior at [facility] where she is getting Rituximab.   She has received 1 dose of rituximab for DLCL (1 week ago) and was scheduled for her second dose on .   CC: fever of 103 at home and chills  History goes back to: 1 day prior to admission when she started having chills and high grade fever.   She was at a barbecue with her relatives when she suddenly experienced these symptoms.   No exposure to COVID-19.    ROS is positive for: chronic urinary frequency  ROS is negative for: She reports no dysuria, no cough, no shortness of breath, no GI symptoms, no abdominal pain, no decrease PO intake.  Of note, patient had a UTI a few weeks ago and she completed a course of bactrim.   In the ED: Patient was hemodynamically stable, Gzcx=569.1  WBC=2, and plts=22; UA --> mildly positive     Hospital course:  Pt admitted for neutropenic fever, with Tmax of 102.2F. CXR does not show any focal consolidation. UA showed large leuk esterase, otherwise unremarkable. Blood cx negative. Urine cx showed <[id number] normal urogenital karmen. Pt on cefepime and acyclovir. COVID-PCR negative on . ID consulted as well.     Interval history:  Per pt, she attended a BBQ on , where she had to hold her urine for several hours, and on monday she started having fever 103.9F associated with chills. Pt also had fever with chills last night and this AM. Pt denied any difficulty breathing, cough, chest pain. Pt complains of right sided abdominal pain that has been present for years, is episodic, non radiating, and is not associated with nausea/vomiting/diarrhea or constipation.  Pt had 1 episode of vomiting in the ER as well, which was non bloody and non bilious. Pt had an episode of loose stool this AM, which was non bloody (pt has had hx of bright red blood mixed with stool about 3 weeks ago).   Pt endorsed having increased urinary frequency but no dysuria or hesitancy.   Pt denied any recent fall, bleeding, any family hx of bleeding disorders or any new medications.  Other than the bluish/black discoloration of b/l UE and LE, pt denied any other skin rash.     Hem/Onc History: Per pt, she is being treated for DLBCL and low platelets (?ITP) by Dr Michael Hernandez at [facility]. Pt received 8 weeks of weekly Rituxan 2 years ago for 'low platelets' and 'large lymph nodes'. Rituxan was started last week again for a course of 5 weeks this time.   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 Bone marrow biopsy showed monoclonal B cell lymphocytes. Pt also has history of immune neutropenia and immune thrombocytopenia.  Pt was treated with Rituxan 2 years ago, and was stable, but recently she had her plt count drop to 30s-40s and she was started on Prednisone with improvement in platelet count, and was started on Rituxan as well last week.   Per the oncologist, pt was supposed to taper the dose of prednisone but given her admission to the hospital, she likely stopped it abruptly, but the family says that she finished the taper.   Unclear if pt was ever tested for HIV, Hepatitis panel, KEITH, Vit B12 or folate. Given pt was on Rituxan, pt should have been likely tested for Hep B.    PAST MEDICAL & SURGICAL HISTORY:  History of ITP  Overactive bladder  Low grade B cell lymphoma  S/P total knee arthroplasty: bilateral  Hx of colon polyps s/p removal  Hx of hysterectomy  Hx of cholecystectomy       SOCIAL HISTORY:  Lives alone and is independent with ADLs;  and has 6 children with good family support  Smoking: None  Alcohol: Occasional wine  Illicit drugs: None    ECO (capable of self care, but no work activities, out of bed >50% of the day)    FAMILY HISTORY:  Hx of malignant Colon Ca -  and brother (Last colonoscopy >10 yrs ago, normal per pt)   Hx of Brest malignancy- Sister (Last mammo 2018 -normal)    Allergies  iodine (Unknown)  penicillins -Rash  Vicodin (Unknown)    ROS:  Negative except for:  fever  Chills  Right sided abdominal pain  loose stool x1; vomiting x1  Increased urinary frequency    Height (cm): 160 (06-23-20 @ 18:58)  Weight (kg): 73.6 (20 @ 18:58)  BMI (kg/m2): 28.7 (20 @ 18:58)  BSA (m2): 1.77 (20 @ 18:58)    Vital Signs Last 24 Hrs  T(C): 37.3 (2020 05:46), Max: 39 (2020 00:31)  T(F): 99.1 (2020 05:46), Max: 102.2 (2020 00:31)  HR: 97 (2020 05:46) (87 - 97)  BP: 112/55 (2020 05:46) (112/55 - 141/60)  RR: 18 (2020 05:46) (16 - 18)  SpO2: 98% (2020 18:58) (98% - 98%)    PHYSICAL EXAM  General: adult in NAD  HEENT: clear oropharynx, anicteric sclera, pink conjunctiva  Neck: supple  CV: normal S1/S2 with no murmur rubs or gallops  Lungs: positive air movement b/l ant lungs, clear to auscultation, no wheezes, no rales  Abdomen: soft non-tender non-distended, no hepatosplenomegaly  Ext: no clubbing cyanosis or edema  Skin: Black/blue discoloration present on b/l UE; Legs covered with SCDs.   Neuro: alert and oriented X 4, no focal deficits    MEDICATIONS:  HOME:  acyclovir 400 mg oral tablet: 1 tab(s) orally 2 times a day (:06)  Centrum Silver oral tablet, chewable: 1 tab(s) orally once a day (2020 00:06)  folic acid 1 mg oral tablet: 1 tab(s) orally once a day (2020 00:06)  latanoprost 0.005% ophthalmic solution: 1 drop(s) to each affected eye once a day (in the evening) (:06)  Omega-3 oral capsule: 1 cap(s) orally once a day (2020 00:06)  Vitamin D3 1000 intl units (25 mcg) oral tablet: 1000 unit(s) orally once a day (2020 00:06)    INPATIENT  acyclovir   Oral Tab/Cap 400 milliGRAM(s) Oral two times a day  cefepime   IVPB 2000 milliGRAM(s) IV Intermittent every 12 hours  cholecalciferol 1000 Unit(s) Oral daily  folic acid 1 milliGRAM(s) Oral daily  latanoprost 0.005% Ophthalmic Solution 1 Drop(s) Both EYES at bedtime  acetaminophen   Tablet .. 650 milliGRAM(s) Oral every 6 hours PRN Temp greater or equal to 38C (100.4F)      LABS:                          9.2    2.16  )-----------( 15       ( 2020 05:34 )             29.6         Mean Cell Volume : 87.3 fL  Mean Cell Hemoglobin : 27.1 pg  Mean Cell Hemoglobin Concentration : 31.1 g/dL  Auto Neutrophil # : 0.78 K/uL <--0.93<----2.35  Auto Lymphocyte # : 1.03 K/uL  Auto Monocyte # : 0.14 K/uL  Auto Eosinophil # : 0.03 K/uL  Auto Basophil # : 0.01 K/uL  Auto Neutrophil % : 36.0 %  Auto Lymphocyte % : 47.7 %  Auto Monocyte % : 6.5 %  Auto Eosinophil % : 1.4 %  Auto Basophil % : 0.5 %      Serial CBC's   @ 05:34  Hct-29.6 / Hgb-9.2 / Plat-15 / RBC-3.39 / WBC-2.16  Serial CBC's   @ 07:14  Hct-26.9 / Hgb-8.8 / Plat-17 / RBC-3.04 / WBC-1.92  Serial CBC's   @ 18:26  Hct-28.8 / Hgb-9.0 / Plat-22 / RBC-3.33 / WBC-2.63    PREVIOUS RECORDS in 2017  WBC trend (2017) 5.19-->2.74-->2.90  Hb trend (2017) 9.7-->9.8-->10.3--->8.9  Platelet count (2017) 259-->179-->176-->135        138  |  103  |  17  ----------------------------<  107<H>  4.4   |  21  |  1.1    Ca    8.6      2020 05:34    TPro  5.6<L>  /  Alb  3.5  /  TBili  0.6  /  DBili  x   /  AST  43<H>  /  ALT  28  /  AlkPhos  85  06-24      PT/INR - ( 2020 07:14 )   PT: TNP sec;   INR: TNP ratio    PTT - ( 2020 07:14 )  PTT:tnp sec    Lactate Dehydrogenase, Serum: 358 (20 @ 05:34)  Lactate, Blood: 1.7 mmol/L (20 @ 22:03)    Urinalysis Basic - ( 2020 20:09 )  Color: Yellow / Appearance: Clear / S.025 / pH: x  Gluc: x / Ketone: Negative  / Bili: Negative / Urobili: <2 mg/dL   Blood: x / Protein: Trace / Nitrite: Negative   Leuk Esterase: Large / RBC: 3 /HPF / WBC 11 /HPF   Sq Epi: x / Non Sq Epi: 3 /HPF / Bacteria: Negative      Culture - Urine (collected 2020 20:09)  Source: .Urine Clean Catch (Midstream)  Final Report (2020 21:16):    <10,000 CFU/mL Normal Urogenital Karmen    Culture - Blood (collected 2020 18:26)  Source: .Blood Blood  Preliminary Report (2020 02:00):    No growth to date.    Culture - Blood (collected 2020 18:26)  Source: .Blood Blood  Preliminary Report (2020 02:00):    No growth to date.    PBF: No giant platelets, no clumps seen. 2-3 platelets/HPF, lymphocytes+; No blast cells seen    RADIOLOGY & ADDITIONAL STUDIES:    Xray Chest 1 View- PORTABLE-Urgent (20 @ 20:19) >    Impression:    No radiographic evidence of acute cardiopulmonary disease.

## 2020-06-24 NOTE — PROGRESS NOTE ADULT - SUBJECTIVE AND OBJECTIVE BOX
HPI  Patient is a 89y old Female who presents with a chief complaint of Fever and chills (2020 16:21)    Currently admitted to medicine with the primary diagnosis of UTI (urinary tract infection)     Today is hospital day 2d.     INTERVAL HPI / OVERNIGHT EVENTS:  Patient was examined and seen at bedside. This morning she is resting comfortably in bed and reports no new issues or overnight events.     ROS: Otherwise unremarkable     PAST MEDICAL & SURGICAL HISTORY  History of ITP  Overactive bladder  Diffuse large B cell lymphoma  S/P total knee arthroplasty: bilateral    ALLERGIES  iodine (Unknown)  penicillins (Unknown)  Vicodin (Unknown)    MEDICATIONS  STANDING MEDICATIONS  acyclovir   Oral Tab/Cap 400 milliGRAM(s) Oral two times a day  cefepime   IVPB 2000 milliGRAM(s) IV Intermittent every 12 hours  cholecalciferol 1000 Unit(s) Oral daily  folic acid 1 milliGRAM(s) Oral daily  lactated ringers. 1000 milliLiter(s) IV Continuous <Continuous>  latanoprost 0.005% Ophthalmic Solution 1 Drop(s) Both EYES at bedtime  meropenem  IVPB 500 milliGRAM(s) IV Intermittent every 8 hours  pantoprazole    Tablet 40 milliGRAM(s) Oral before breakfast  predniSONE   Tablet 70 milliGRAM(s) Oral daily    PRN MEDICATIONS  acetaminophen   Tablet .. 650 milliGRAM(s) Oral every 6 hours PRN    VITALS:  T(F): 101.6  HR: 92  BP: 106/53  RR: 18  SpO2: 98%    PHYSICAL EXAM  GEN: NAD, Resting comfortably in bed  PULM: Clear to auscultation bilaterally, No wheezes  CVS: Regular rate and rhythm, S1-S2, no murmurs  ABD: Soft, non-tender, non-distended, no guarding  EXT: No edema  NEURO: AAOx3, no focal deficits    LABS                        9.2    2.16  )-----------( 15       ( 2020 05:34 )             29.6     06-24    138  |  103  |  17  ----------------------------<  107<H>  4.4   |  21  |  1.1    Ca    8.6      2020 05:34    TPro  5.6<L>  /  Alb  3.5  /  TBili  0.6  /  DBili  x   /  AST  43<H>  /  ALT  28  /  AlkPhos  85  06-24    PT/INR - ( 2020 07:14 )   PT: TNP sec;   INR: TNP ratio         PTT - ( 2020 07:14 )  PTT:tnp sec  Urinalysis Basic - ( 2020 20:09 )    Color: Yellow / Appearance: Clear / S.025 / pH: x  Gluc: x / Ketone: Negative  / Bili: Negative / Urobili: <2 mg/dL   Blood: x / Protein: Trace / Nitrite: Negative   Leuk Esterase: Large / RBC: 3 /HPF / WBC 11 /HPF   Sq Epi: x / Non Sq Epi: 3 /HPF / Bacteria: Negative            Culture - Urine (collected 2020 20:09)  Source: .Urine Clean Catch (Midstream)  Final Report (2020 21:16):    <10,000 CFU/mL Normal Urogenital Karmen    Culture - Blood (collected 2020 18:26)  Source: .Blood Blood  Preliminary Report (2020 02:00):    No growth to date.    Culture - Blood (collected 2020 18:26)  Source: .Blood Blood  Preliminary Report (2020 02:00):    No growth to date.      RADIOLOGY    Assessment and Plan:   · Assessment		  89 year old female with a PMhx of diffuse large cell lymphoma (currently on rituximab last session 1 week ago, next due on ) , ITP, cervical disk inflammation presenting for high grade fevers and chills at home. Patient is admitted for further evaluation and management of fever in the setting of immunosuppression.    #thrombocytopenia, possibly sepsis vs. 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 (; no neutropenia on admission)   -Could be autoimmune (hard to say given no recent flow, bone marrow biopsy or imaging) vs due to sepsis  -Last flow cytometry from 2017 showed T cell granulocytes    -Send peripheral flow cytometry; Check Quantitative immunoglobulin levels  -No need for Neupogen at this time  -Pt can follow up with Dr Hernandez for further workup outpatient    #Acute pyelonephritis with sepsis present on admission  -Treat with abx per ID  -Neutropenic precautions  -Pt is having diarrheal episodes -Would recommend sending C diff with appropriate contact isolation in the meantime.     #Misc  - DVT Prophylaxis: SCDs in the setting of thrombocytopenia,  - Diet: regular  - Activity: as tolerated  - Code Status: Full Code

## 2020-06-24 NOTE — CONSULT NOTE ADULT - ATTENDING COMMENTS
Pt seen and examined. Agree with above A/p as detailed by resident with edits as appropriate.  Case also d/w with pt's oncologist Dr Hernandez.

## 2020-06-24 NOTE — PROGRESS NOTE ADULT - ASSESSMENT
89 year old female with a PMhx of diffuse large cell lymphoma (currently on rituximab last session 1 week ago, next due on 6/23/2) , ITP, cervical disk inflammation.   Patient is presenting for high grade fevers and chills at home  In the ED, Veho=844.1, hemodynamically stable and AAOx3  Patient is admitted for further evaluation and management of fever in the setting of immunosuppression     # Sepsis/Neuropenic Fever   -Possible UTI/Pyelo. UA is not convincing , no dysuria  (however patient has chronic urinary frequency which could mask her symptoms). Additionally, patient completed a course of bactrim 2 weeks ago for UTI.  -CXR normal   - cont Merop  - follow up Ucx and Bcx  - monitor for fevers  -renal u/s w/ PVR    # Pancytopenia -  Pancytopenia likely from rituximab   # Thrombocytopenia, likely multifactorial in the setting of Hx of ITP, rituximab -  Patient unsure when the last time her plts were normal.   Possible element of bactrim induced thrombocytopenia  She had previously completed a course of steroids from her PCP (Dr. Aj) for possible ITP  - no active signs of bleeding. Monitor for bleeding  - hold DVT prophylaxis  - PO Prednisone if sepsis improves   -keep Plts>10 or >50 if active bleeding (currently none)    # Possible KATHIA? Cr=1.2  -No baseline, but no known hx of kidney disease  -s/p 1L LR in ED  - gentle hydration    # DLCL - on rituximab  2nd dose was due on 6/23/20  - continue acyclovir prophylaxis for VZV/HSV in the setting of immunosuppression  - continue folic acid    #Misc  - DVT Prophylaxis: SCDs in the setting of thrombocytopenia,  - Diet: regular  - Activity: as tolerated  - Code Status: Full Code    SOCIAL: patient lives alone, walks with a cane, comfortable with daily personal care.     Oncologist: Dr. Michael Hernandez - [facility]  PCP: Dr. Lui Aj.     #Progress Note Handoff  Pending (specify):  Consults_ID, Onc___Clinical improvement and stability_____Tests___Renal u/s_____PT____x____  Pt/Family discussion: Pt informed and agrees with the current plan  Disposition: Home______/SNF_______/To be determined____x____ 89 year old female with a PMhx of diffuse large cell lymphoma (currently on rituximab last session 1 week ago, next due on 6/23/2) , ITP, cervical disk inflammation.   Patient is presenting for high grade fevers and chills at home  In the ED, Tidn=182.1, hemodynamically stable and AAOx3  Patient is admitted for further evaluation and management of fever in the setting of immunosuppression     # Sepsis/Neuropenic Fever   -Possible UTI/Pyelo. UA is not convincing , no dysuria  (however patient has chronic urinary frequency which could mask her symptoms). Additionally, patient completed a course of bactrim 2 weeks ago for UTI.  -CXR normal   - cont Merop  - follow up Ucx and Bcx  - monitor for fevers  -renal u/s w/ PVR  -check c. dif if having diarrhea    # Pancytopenia -  Pancytopenia likely from rituximab   # Thrombocytopenia, likely multifactorial in the setting of Hx of ITP, rituximab -  Patient unsure when the last time her plts were normal.   Possible element of bactrim induced thrombocytopenia  She had previously completed a course of steroids from her PCP (Dr. Aj) for possible ITP  - no active signs of bleeding. Monitor for bleeding  - hold DVT prophylaxis  - PO Prednisone if sepsis improves   -keep Plts>10 or >50 if active bleeding (currently none)    # Possible KATHIA? Cr=1.2  -No baseline, but no known hx of kidney disease  -s/p 1L LR in ED  - gentle hydration    # DLCL - on rituximab  2nd dose was due on 6/23/20  - continue acyclovir prophylaxis for VZV/HSV in the setting of immunosuppression  - continue folic acid    #Misc  - DVT Prophylaxis: SCDs in the setting of thrombocytopenia,  - Diet: regular  - Activity: as tolerated  - Code Status: Full Code    High risk pt. Prognosis is guarded    SOCIAL: patient lives alone, walks with a cane, comfortable with daily personal care.     Oncologist: Dr. Michael Hernandez - [facility]  PCP: Dr. Lui Aj.     #Progress Note Handoff  Pending (specify):  Consults_ID, Onc___Clinical improvement and stability_____Tests___Renal u/s_____PT____x____  Pt/Family discussion: Pt informed and agrees with the current plan  Disposition: Home______/SNF_______/To be determined____x____

## 2020-06-25 LAB
% ALBUMIN: 57.8 % — SIGNIFICANT CHANGE UP
% ALPHA 1: 9.6 % — SIGNIFICANT CHANGE UP
% ALPHA 2: 12.7 % — SIGNIFICANT CHANGE UP
% BETA: 9.2 % — SIGNIFICANT CHANGE UP
% GAMMA: 10.7 % — SIGNIFICANT CHANGE UP
ALBUMIN SERPL ELPH-MCNC: 3.1 G/DL — LOW (ref 3.6–5.5)
ALBUMIN/GLOB SERPL ELPH: 1.4 RATIO — SIGNIFICANT CHANGE UP
ALPHA1 GLOB SERPL ELPH-MCNC: 0.5 G/DL — HIGH (ref 0.1–0.4)
ALPHA2 GLOB SERPL ELPH-MCNC: 0.7 G/DL — SIGNIFICANT CHANGE UP (ref 0.5–1)
ANION GAP SERPL CALC-SCNC: 14 MMOL/L — SIGNIFICANT CHANGE UP (ref 7–14)
B-GLOBULIN SERPL ELPH-MCNC: 0.5 G/DL — SIGNIFICANT CHANGE UP (ref 0.5–1)
BASOPHILS # BLD AUTO: 0.01 K/UL — SIGNIFICANT CHANGE UP (ref 0–0.2)
BASOPHILS NFR BLD AUTO: 0.2 % — SIGNIFICANT CHANGE UP (ref 0–1)
BUN SERPL-MCNC: 26 MG/DL — HIGH (ref 10–20)
C DIFF BY PCR RESULT: NEGATIVE — SIGNIFICANT CHANGE UP
C DIFF TOX GENS STL QL NAA+PROBE: SIGNIFICANT CHANGE UP
CALCIUM SERPL-MCNC: 8.2 MG/DL — LOW (ref 8.5–10.1)
CHLORIDE SERPL-SCNC: 99 MMOL/L — SIGNIFICANT CHANGE UP (ref 98–110)
CO2 SERPL-SCNC: 18 MMOL/L — SIGNIFICANT CHANGE UP (ref 17–32)
CREAT SERPL-MCNC: 1 MG/DL — SIGNIFICANT CHANGE UP (ref 0.7–1.5)
DIR ANTIGLOB POLYSPECIFIC INTERPRETATION: SIGNIFICANT CHANGE UP
EOSINOPHIL # BLD AUTO: 0 K/UL — SIGNIFICANT CHANGE UP (ref 0–0.7)
EOSINOPHIL NFR BLD AUTO: 0 % — SIGNIFICANT CHANGE UP (ref 0–8)
FOLATE SERPL-MCNC: >20 NG/ML — SIGNIFICANT CHANGE UP
GAMMA GLOBULIN: 0.6 G/DL — SIGNIFICANT CHANGE UP (ref 0.6–1.6)
GLUCOSE SERPL-MCNC: 180 MG/DL — HIGH (ref 70–99)
HAPTOGLOB SERPL-MCNC: 145 MG/DL — SIGNIFICANT CHANGE UP (ref 34–200)
HCT VFR BLD CALC: 26.9 % — LOW (ref 37–47)
HGB BLD-MCNC: 8.7 G/DL — LOW (ref 12–16)
IGA FLD-MCNC: 55 MG/DL — LOW (ref 84–499)
IGG FLD-MCNC: 542 MG/DL — LOW (ref 610–1660)
IGM SERPL-MCNC: 79 MG/DL — SIGNIFICANT CHANGE UP (ref 35–242)
IMM GRANULOCYTES NFR BLD AUTO: 5.6 % — HIGH (ref 0.1–0.3)
INTERPRETATION SERPL IFE-IMP: SIGNIFICANT CHANGE UP
LYMPHOCYTES # BLD AUTO: 0.72 K/UL — LOW (ref 1.2–3.4)
LYMPHOCYTES # BLD AUTO: 11 % — LOW (ref 20.5–51.1)
MAGNESIUM SERPL-MCNC: 1.8 MG/DL — SIGNIFICANT CHANGE UP (ref 1.8–2.4)
MCHC RBC-ENTMCNC: 27.8 PG — SIGNIFICANT CHANGE UP (ref 27–31)
MCHC RBC-ENTMCNC: 32.3 G/DL — SIGNIFICANT CHANGE UP (ref 32–37)
MCV RBC AUTO: 85.9 FL — SIGNIFICANT CHANGE UP (ref 81–99)
MONOCYTES # BLD AUTO: 0.26 K/UL — SIGNIFICANT CHANGE UP (ref 0.1–0.6)
MONOCYTES NFR BLD AUTO: 4 % — SIGNIFICANT CHANGE UP (ref 1.7–9.3)
NEUTROPHILS # BLD AUTO: 5.2 K/UL — SIGNIFICANT CHANGE UP (ref 1.4–6.5)
NEUTROPHILS NFR BLD AUTO: 79.2 % — HIGH (ref 42.2–75.2)
NRBC # BLD: 0 /100 WBCS — SIGNIFICANT CHANGE UP (ref 0–0)
PLATELET # BLD AUTO: 18 K/UL — CRITICAL LOW (ref 130–400)
POTASSIUM SERPL-MCNC: 4.3 MMOL/L — SIGNIFICANT CHANGE UP (ref 3.5–5)
POTASSIUM SERPL-SCNC: 4.3 MMOL/L — SIGNIFICANT CHANGE UP (ref 3.5–5)
PROT PATTERN SERPL ELPH-IMP: SIGNIFICANT CHANGE UP
PROT SERPL-MCNC: 5.3 G/DL — LOW (ref 6–8.3)
PROT SERPL-MCNC: 5.3 G/DL — LOW (ref 6–8.3)
RBC # BLD: 3.13 M/UL — LOW (ref 4.2–5.4)
RBC # FLD: 16.1 % — HIGH (ref 11.5–14.5)
SODIUM SERPL-SCNC: 131 MMOL/L — LOW (ref 135–146)
VIT B12 SERPL-MCNC: 746 PG/ML — SIGNIFICANT CHANGE UP (ref 232–1245)
WBC # BLD: 6.56 K/UL — SIGNIFICANT CHANGE UP (ref 4.8–10.8)
WBC # FLD AUTO: 6.56 K/UL — SIGNIFICANT CHANGE UP (ref 4.8–10.8)

## 2020-06-25 PROCEDURE — 99232 SBSQ HOSP IP/OBS MODERATE 35: CPT

## 2020-06-25 PROCEDURE — 99233 SBSQ HOSP IP/OBS HIGH 50: CPT

## 2020-06-25 RX ORDER — VANCOMYCIN HCL 1 G
125 VIAL (EA) INTRAVENOUS EVERY 6 HOURS
Refills: 0 | Status: DISCONTINUED | OUTPATIENT
Start: 2020-06-25 | End: 2020-06-26

## 2020-06-25 RX ORDER — SODIUM CHLORIDE 9 MG/ML
500 INJECTION INTRAMUSCULAR; INTRAVENOUS; SUBCUTANEOUS ONCE
Refills: 0 | Status: COMPLETED | OUTPATIENT
Start: 2020-06-25 | End: 2020-06-25

## 2020-06-25 RX ORDER — DIPHENHYDRAMINE HCL 50 MG
25 CAPSULE ORAL ONCE
Refills: 0 | Status: COMPLETED | OUTPATIENT
Start: 2020-06-25 | End: 2020-06-25

## 2020-06-25 RX ADMIN — Medication 400 MILLIGRAM(S): at 18:12

## 2020-06-25 RX ADMIN — SODIUM CHLORIDE 75 MILLILITER(S): 9 INJECTION, SOLUTION INTRAVENOUS at 12:53

## 2020-06-25 RX ADMIN — Medication 25 MILLIGRAM(S): at 02:17

## 2020-06-25 RX ADMIN — LATANOPROST 1 DROP(S): 0.05 SOLUTION/ DROPS OPHTHALMIC; TOPICAL at 23:03

## 2020-06-25 RX ADMIN — Medication 70 MILLIGRAM(S): at 06:12

## 2020-06-25 RX ADMIN — Medication 1000 UNIT(S): at 11:44

## 2020-06-25 RX ADMIN — Medication 400 MILLIGRAM(S): at 06:14

## 2020-06-25 RX ADMIN — MEROPENEM 100 MILLIGRAM(S): 1 INJECTION INTRAVENOUS at 23:04

## 2020-06-25 RX ADMIN — PANTOPRAZOLE SODIUM 40 MILLIGRAM(S): 20 TABLET, DELAYED RELEASE ORAL at 06:12

## 2020-06-25 RX ADMIN — Medication 125 MILLIGRAM(S): at 11:43

## 2020-06-25 RX ADMIN — Medication 125 MILLIGRAM(S): at 18:12

## 2020-06-25 RX ADMIN — Medication 125 MILLIGRAM(S): at 23:03

## 2020-06-25 RX ADMIN — MEROPENEM 100 MILLIGRAM(S): 1 INJECTION INTRAVENOUS at 06:14

## 2020-06-25 RX ADMIN — MEROPENEM 100 MILLIGRAM(S): 1 INJECTION INTRAVENOUS at 14:33

## 2020-06-25 RX ADMIN — Medication 1 MILLIGRAM(S): at 11:44

## 2020-06-25 RX ADMIN — SODIUM CHLORIDE 500 MILLILITER(S): 9 INJECTION INTRAMUSCULAR; INTRAVENOUS; SUBCUTANEOUS at 06:16

## 2020-06-25 NOTE — PROGRESS NOTE ADULT - ASSESSMENT
· Assessment		  89 yo female with PMHx of DLBCL, ITP, presented for fever with chills.     IMPRESSION;   #Resolved Sepsis secondary to possible acute pyelonephritis  No pyuria ( was on po Bactrim 2 wks PTA )  Doubt drug induced fevers ( would expect a rash )  Not Bactrim induced thrombocytopenia as would be very unusual after 2 weeks  No PNA  Blood & Urine cxs NGTD 6/22  COVID-19 NG 6/22    diarrhea : r/o CD    RECOMMENDATIONS;   Meropenem 500 mg iv q8h  stools for CD  po vancomycin 125 mg q6h

## 2020-06-25 NOTE — PROGRESS NOTE ADULT - SUBJECTIVE AND OBJECTIVE BOX
Patient is a 89y old  Female who presents with a chief complaint of Fever and chills (24 Jun 2020 16:21)      Subjective: Her loose BM is better today. C diff testing not sent yet as she did not have BM   She had no fevers overnight         Vital Signs Last 24 Hrs  T(C): 36.4 (25 Jun 2020 05:03), Max: 38.7 (24 Jun 2020 13:54)  T(F): 97.6 (25 Jun 2020 05:03), Max: 101.6 (24 Jun 2020 13:54)  HR: 84 (25 Jun 2020 13:03) (70 - 92)  BP: 137/67 (25 Jun 2020 13:03) (92/48 - 137/67)  BP(mean): --  RR: 20 (25 Jun 2020 13:03) (18 - 20)  SpO2: --    PHYSICAL EXAM  General: adult in NAD  Neck: supple  CV: normal S1/S2   Lungs: positive air movement b/l ant lungs  Abdomen: soft non-tender   Ext: no clubbing cyanosis or edema  Skin: Bruising noted in b/l UE   Neuro: alert and oriented X 4, no focal deficits    MEDICATIONS  (STANDING):  acyclovir   Oral Tab/Cap 400 milliGRAM(s) Oral two times a day  cholecalciferol 1000 Unit(s) Oral daily  folic acid 1 milliGRAM(s) Oral daily  lactated ringers. 1000 milliLiter(s) (75 mL/Hr) IV Continuous <Continuous>  latanoprost 0.005% Ophthalmic Solution 1 Drop(s) Both EYES at bedtime  meropenem  IVPB 500 milliGRAM(s) IV Intermittent every 8 hours  pantoprazole    Tablet 40 milliGRAM(s) Oral before breakfast  predniSONE   Tablet 70 milliGRAM(s) Oral daily  vancomycin    Solution 125 milliGRAM(s) Oral every 6 hours    MEDICATIONS  (PRN):  acetaminophen   Tablet .. 650 milliGRAM(s) Oral every 6 hours PRN Temp greater or equal to 38C (100.4F)      LABS:                          8.7    6.56  )-----------( 18       ( 25 Jun 2020 11:14 )             26.9         Mean Cell Volume : 85.9 fL  Mean Cell Hemoglobin : 27.8 pg  Mean Cell Hemoglobin Concentration : 32.3 g/dL  Auto Neutrophil # : 5.20 K/uL  Auto Lymphocyte # : 0.72 K/uL  Auto Monocyte # : 0.26 K/uL  Auto Eosinophil # : 0.00 K/uL  Auto Basophil # : 0.01 K/uL  Auto Neutrophil % : 79.2 %  Auto Lymphocyte % : 11.0 %  Auto Monocyte % : 4.0 %  Auto Eosinophil % : 0.0 %  Auto Basophil % : 0.2 %      Serial CBC's  06-25 @ 11:14  Hct-26.9 / Hgb-8.7 / Plat-18 / RBC-3.13 / WBC-6.56  Serial CBC's  06-24 @ 20:52  Hct-26.7 / Hgb-8.6 / Plat-15 / RBC-3.16 / WBC-5.54  Serial CBC's  06-24 @ 05:34  Hct-29.6 / Hgb-9.2 / Plat-15 / RBC-3.39 / WBC-2.16  Serial CBC's  06-23 @ 07:14  Hct-26.9 / Hgb-8.8 / Plat-17 / RBC-3.04 / WBC-1.92  Serial CBC's  06-22 @ 18:26  Hct-28.8 / Hgb-9.0 / Plat-22 / RBC-3.33 / WBC-2.63      06-25    131<L>  |  99  |  26<H>  ----------------------------<  180<H>  4.3   |  18  |  1.0    Ca    8.2<L>      25 Jun 2020 11:14  Mg     1.8     06-25    TPro  5.6<L>  /  Alb  3.5  /  TBili  0.6  /  DBili  x   /  AST  43<H>  /  ALT  28  /  AlkPhos  85  06-24          Reticulocyte Percent: 1.5 % (06-24 @ 20:52)                    Culture - Urine (collected 22 Jun 2020 20:09)  Source: .Urine Clean Catch (Midstream)  Final Report (23 Jun 2020 21:16):    <10,000 CFU/mL Normal Urogenital Karmen    Culture - Blood (collected 22 Jun 2020 18:26)  Source: .Blood Blood  Preliminary Report (24 Jun 2020 02:00):    No growth to date.    Culture - Blood (collected 22 Jun 2020 18:26)  Source: .Blood Blood  Preliminary Report (24 Jun 2020 02:00):    No growth to date.            BLOOD SMEAR INTERPRETATION:       RADIOLOGY & ADDITIONAL STUDIES:

## 2020-06-25 NOTE — PROGRESS NOTE ADULT - ASSESSMENT
89 yo female with PMHx of low grade B cell lymphoma with bone marrow involvement,  Pt has been chronically neutropenic and thrombocytopenic  treated previously with Rituxan and steroids.    #Thrombocytopenia -Likely worsened due to sepsis vs drug induced (ws on bactrim recently) with history of Immune thrombocytopenia (was treated with one dose Rituxan 375mg/m2 on 6/16/2020).   -HIV, Hepatitis panel was recently checked on 6/10/2020 -Negative  -Treat the underlying infection with IV antibiotics per ID.  C diff testing not sent yet as she did not have BM yet   She was started on Prednisone 1mg/kg on 6/24  c/w protonix   -Continue to monitor platelet count for now.   Transfuse platelet if less than 10k or if active bleeding.     We can monitor platelets for now. 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 Sample not sent yet as no BM   -Treat with abx per ID.   She was started on PO vancomycin today . On Meropenem   Urine Cx and blood Cx negative   COVID negative and CXR negative       We will follow with you   -Pt can follow up with Dr Hernandez as outpt after discharge

## 2020-06-25 NOTE — PROGRESS NOTE ADULT - ASSESSMENT
89 year old female with a PMhx of diffuse large cell lymphoma (currently on rituximab last session 1 week ago, next due on 6/23/2) , ITP, cervical disk inflammation.   Patient is presenting for high grade fevers and chills at home  In the ED, Qrtn=715.1, hemodynamically stable and AAOx3  Patient is admitted for further evaluation and management of fever in the setting of immunosuppression     # Sepsis/Neuropenic Fever   -Possible UTI/Pyelo. UA is not convincing , no dysuria  (however patient has chronic urinary frequency which could mask her symptoms). Additionally, patient completed a course of bactrim 2 weeks ago for UTI.  -CXR normal   - cont Merop  - follow up Ucx and Bcx  - monitor for fevers  -renal u/s w/ PVR negative  -check c. dif if having diarrhea    # Pancytopenia -  Pancytopenia likely from rituximab. Resolved   # Thrombocytopenia, likely multifactorial in the setting of Hx of ITP, rituximab -  Patient unsure when the last time her plts were normal.   Possible element of bactrim induced thrombocytopenia  She had previously completed a course of steroids from her PCP (Dr. Aj) for possible ITP  - no active signs of bleeding. Monitor for bleeding  - hold DVT prophylaxis  - PO Prednisone as per heme (cleared by ID)  -keep Plts>10 or >50 if active bleeding (currently none)    # Possible KATHIA? Cr=1.2  -No baseline, but no known hx of kidney disease  -s/p 1L LR in ED  - gentle hydration    # DLCL - on rituximab  2nd dose was due on 6/23/20  - continue acyclovir prophylaxis for VZV/HSV in the setting of immunosuppression  - continue folic acid    #Misc  - DVT Prophylaxis: SCDs in the setting of thrombocytopenia,  - Diet: regular  - Activity: as tolerated  - Code Status: Full Code    High risk pt. Prognosis is guarded    SOCIAL: patient lives alone, walks with a cane, comfortable with daily personal care.     Oncologist: Dr. Michael Hernandez - [facility]  PCP: Dr. Lui Aj.     #Progress Note Handoff  Pending (specify):  Consults_ID, Onc___Clinical improvement and stability__x___Tests_____PT____x____  Pt/Family discussion: Pt informed and agrees with the current plan  Disposition: Home______/SNF_______/To be determined____x____

## 2020-06-25 NOTE — PROGRESS NOTE ADULT - SUBJECTIVE AND OBJECTIVE BOX
Patient is a 89y old  Female who presents with a chief complaint of Fever (24 Jun 2020 09:13)    INTERVAL HPI/OVERNIGHT EVENTS: feels better today. Had low BP overnight and received a bolus. Had 2 loose BMs yesterday but none today.  ROS: Denies CP, SOB, AP  All other systems reviewed and are within normal limits.  InitialHPI:  89 year old female with a PMhx of diffuse large cell lymphoma, ITP, cervical disk inflammation.   Patient is on active chemotherapy for lymphoma. Her last session was one week prior at [facility] where she is getting Rituximab.   She has received 1 dose of rituximab for DLCL (1 week ago) and was scheduled for her second dose on 6/23.     CC: fever of 103 at home and chills    History goes back to: 1 day prior to admission when she started having chills and high grade fever.   She was at a barbecue with her relatives when she suddenly experienced these symptoms.   No exposure to COVID-19.      ROS is positive for: chronic urinary frequency    ROS is negative for: She reports no dysuria, no cough, no shortness of breath, no GI symptoms, no abdominal pain, no decrease PO intake.    Of note, patient had a UTI a few weeks ago and she completed a course of bactrim.     In the ED: Patient was hemodynamically stable, Mhyi=280.1  WBC=2, and plts=22  UA --> mildly positive   CXR: no signs of acute cardiopulmonary disease (unofficial reading)  Patient got 1 dose of meropenem in the ED. (22 Jun 2020 22:52)    UTI(URINARY TRACT INFECTION),SEPSIS,THROMBOCYTOPENIA  ^UTI(URINARY TRACT INFECTION),SEPSIS,THROMBOCYTOPENIA  Handoff  MEWS Score  History of ITP  Overactive bladder  Diffuse large B cell lymphoma  UTI (urinary tract infection)  S/P total knee arthroplasty  FEVER  90+  Thrombocytopenia  Sepsis    PAST MEDICAL & SURGICAL HISTORY:  History of ITP  Overactive bladder  Diffuse large B cell lymphoma  S/P total knee arthroplasty: bilateral      General: NAD, AAO3  CV: S1 S2  Resp: decreased breath sounds at bases  GI: NT/ND/S +BS  MS: no clubbing/cyanosis/edema, 2+ pulses b/l  Neuro: NICHOLS, 2+reflexes thruout            MEDICATIONS  (STANDING):  acyclovir   Oral Tab/Cap 400 milliGRAM(s) Oral two times a day  cholecalciferol 1000 Unit(s) Oral daily  folic acid 1 milliGRAM(s) Oral daily  lactated ringers. 1000 milliLiter(s) (75 mL/Hr) IV Continuous <Continuous>  latanoprost 0.005% Ophthalmic Solution 1 Drop(s) Both EYES at bedtime  meropenem  IVPB 500 milliGRAM(s) IV Intermittent every 8 hours  pantoprazole    Tablet 40 milliGRAM(s) Oral before breakfast  predniSONE   Tablet 70 milliGRAM(s) Oral daily  vancomycin    Solution 125 milliGRAM(s) Oral every 6 hours    MEDICATIONS  (PRN):  acetaminophen   Tablet .. 650 milliGRAM(s) Oral every 6 hours PRN Temp greater or equal to 38C (100.4F)    Home Medications:  acyclovir 400 mg oral tablet: 1 tab(s) orally 2 times a day (23 Jun 2020 00:06)  Centrum Silver oral tablet, chewable: 1 tab(s) orally once a day (23 Jun 2020 00:06)  folic acid 1 mg oral tablet: 1 tab(s) orally once a day (23 Jun 2020 00:06)  latanoprost 0.005% ophthalmic solution: 1 drop(s) to each affected eye once a day (in the evening) (23 Jun 2020 00:06)  Omega-3 oral capsule: 1 cap(s) orally once a day (23 Jun 2020 00:06)  Vitamin D3 1000 intl units (25 mcg) oral tablet: 1000 unit(s) orally once a day (23 Jun 2020 00:06)    Vital Signs Last 24 Hrs  T(C): 35.8 (25 Jun 2020 13:51), Max: 37.3 (24 Jun 2020 21:16)  T(F): 96.4 (25 Jun 2020 13:51), Max: 99.2 (24 Jun 2020 21:16)  HR: 87 (25 Jun 2020 13:51) (70 - 92)  BP: 144/58 (25 Jun 2020 13:51) (92/48 - 144/58)  BP(mean): --  RR: 18 (25 Jun 2020 13:51) (18 - 20)  SpO2: --  CAPILLARY BLOOD GLUCOSE        LABS:                        8.7    6.56  )-----------( 18       ( 25 Jun 2020 11:14 )             26.9     06-25    131<L>  |  99  |  26<H>  ----------------------------<  180<H>  4.3   |  18  |  1.0    Ca    8.2<L>      25 Jun 2020 11:14  Mg     1.8     06-25    TPro  5.3<L>  /  Alb  x   /  TBili  x   /  DBili  x   /  AST  x   /  ALT  x   /  AlkPhos  x   06-24    LIVER FUNCTIONS - ( 24 Jun 2020 20:52 )  Alb: x     / Pro: 5.3 g/dL / ALK PHOS: x     / ALT: x     / AST: x     / GGT: x                           Culture - Urine (collected 22 Jun 2020 20:09)  Source: .Urine Clean Catch (Midstream)  Final Report (23 Jun 2020 21:16):    <10,000 CFU/mL Normal Urogenital Karmen    Culture - Blood (collected 22 Jun 2020 18:26)  Source: .Blood Blood  Preliminary Report (24 Jun 2020 02:00):    No growth to date.    Culture - Blood (collected 22 Jun 2020 18:26)  Source: .Blood Blood  Preliminary Report (24 Jun 2020 02:00):    No growth to date.      Consultant Notes Reviewed:  [x ] YES  [ ] NO  Care Discussed with Consultants/Other Providers/ Housestaff [ x] YES  [ ] NO  Radiology, labs, new studies personally reviewed.

## 2020-06-25 NOTE — PROGRESS NOTE ADULT - SUBJECTIVE AND OBJECTIVE BOX
HPI  Patient is a 89y old Female who presents with a chief complaint of Fever and chills (25 Jun 2020 17:41)    Currently admitted to medicine with the primary diagnosis of UTI (urinary tract infection)     Today is hospital day 3d.     INTERVAL HPI / OVERNIGHT EVENTS:  Patient was examined and seen at bedside. This morning she is resting comfortably in bed and had one episode of diarrhea overnight. She developed a rash overnight and given benadryl. Pt states rash has improved and itchiness still present.    ROS: Otherwise unremarkable     PAST MEDICAL & SURGICAL HISTORY  History of ITP  Overactive bladder  Diffuse large B cell lymphoma  S/P total knee arthroplasty: bilateral    ALLERGIES  iodine (Unknown)  penicillins (Unknown)  Vicodin (Unknown)    MEDICATIONS  STANDING MEDICATIONS  acyclovir   Oral Tab/Cap 400 milliGRAM(s) Oral two times a day  cholecalciferol 1000 Unit(s) Oral daily  folic acid 1 milliGRAM(s) Oral daily  lactated ringers. 1000 milliLiter(s) IV Continuous <Continuous>  latanoprost 0.005% Ophthalmic Solution 1 Drop(s) Both EYES at bedtime  meropenem  IVPB 500 milliGRAM(s) IV Intermittent every 8 hours  pantoprazole    Tablet 40 milliGRAM(s) Oral before breakfast  predniSONE   Tablet 70 milliGRAM(s) Oral daily  vancomycin    Solution 125 milliGRAM(s) Oral every 6 hours    PRN MEDICATIONS  acetaminophen   Tablet .. 650 milliGRAM(s) Oral every 6 hours PRN    VITALS:  T(F): 96.4  HR: 87  BP: 144/58  RR: 18  SpO2: --    PHYSICAL EXAM  GEN: NAD, Resting comfortably in bed  PULM: Clear to auscultation bilaterally, No wheezes  CVS: Regular rate and rhythm, S1-S2, no murmurs  ABD: Soft, non-tender, non-distended, no guarding  EXT: No edema  NEURO: AAOx3, no focal deficits    LABS                        8.7    6.56  )-----------( 18       ( 25 Jun 2020 11:14 )             26.9     06-25    131<L>  |  99  |  26<H>  ----------------------------<  180<H>  4.3   |  18  |  1.0    Ca    8.2<L>      25 Jun 2020 11:14  Mg     1.8     06-25    TPro  5.3<L>  /  Alb  3.1<L>  /  TBili  x   /  DBili  x   /  AST  x   /  ALT  x   /  AlkPhos  x   06-24              Culture - Urine (collected 22 Jun 2020 20:09)  Source: .Urine Clean Catch (Midstream)  Final Report (23 Jun 2020 21:16):    <10,000 CFU/mL Normal Urogenital Karmen      RADIOLOGY      Assessment and Plan:   · Assessment		  89 year old female with a PMhx of diffuse large cell lymphoma (currently on rituximab last session 1 week ago, next due on 6/23/2) , ITP, cervical disk inflammation presenting for high grade fevers and chills at home. Patient is admitted for further evaluation and management of fever in the setting of immunosuppression.    #thrombocytopenia, possibly sepsis vs. bactrim induced   - negative HIV, hepatitis panel  - monitor plts, transfuse if <10 or <50 with active bleeding  - hold DVT prophylaxis  - meropenem as per ID reccs  - prednisone 75 mg PO daily with pantoprazole 40 mg daily, ID approves  - may consider IVIG or rituximab if plts don't improve with steroids as per heme onc reccs     #Acute pyelonephritis with sepsis present on admission  - blood and urine cx negative  - CXR negative COVID negative  -Treat with abx per ID  - Neutropenic precautions  - pt reports diarrhea episodes improved  - PO vancomycin 125 mg G6H    #Pancytopenia likely from rituximab  - previously completed course of rituxan 8 weeks 2 years ago, started another course last week (given first dose 6/16/20)     #Chronic normocytic anemia   -Hb 9.2; baseline around 10  -Check Vit B12, folate, haptoglobin, LDH, retic count, Zackary profile, SPEP, HAROON  -C/w folic acid supplementation    #Neutropenia with 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

## 2020-06-25 NOTE — PROGRESS NOTE ADULT - SUBJECTIVE AND OBJECTIVE BOX
NEYMAR WILLARD  89y, Female    All available historical data reviewed    OVERNIGHT EVENTS:  no fevers  pruritus mostly chest/scalp  diarrhea  no  complaints    ROS:  General: Denies rigors, night sweats  HEENT: Denies headache, rhinorrhea, sore throat, eye pain  CV: Denies CP, palpitations  PULM: Denies wheezing, hemoptysis  GI: Denies hematemesis, hematochezia, melena  : Denies discharge, hematuria  MSK: Denies arthralgias, myalgias  SKIN: pruritus  NEURO: Denies paresthesias, weakness  PSYCH: Denies depression, anxiety    VITALS:  T(F): 97.6, Max: 101.6 (06-24-20 @ 13:54)  HR: 70  BP: 92/48  RR: 18Vital Signs Last 24 Hrs  T(C): 36.4 (25 Jun 2020 05:03), Max: 38.7 (24 Jun 2020 13:54)  T(F): 97.6 (25 Jun 2020 05:03), Max: 101.6 (24 Jun 2020 13:54)  HR: 70 (25 Jun 2020 05:03) (70 - 92)  BP: 92/48 (25 Jun 2020 05:03) (92/48 - 120/62)  BP(mean): --  RR: 18 (25 Jun 2020 05:03) (18 - 18)  SpO2: --    TESTS & MEASUREMENTS:                        8.6    5.54  )-----------( 15       ( 24 Jun 2020 20:52 )             26.7     06-24    138  |  103  |  17  ----------------------------<  107<H>  4.4   |  21  |  1.1    Ca    8.6      24 Jun 2020 05:34    TPro  5.6<L>  /  Alb  3.5  /  TBili  0.6  /  DBili  x   /  AST  43<H>  /  ALT  28  /  AlkPhos  85  06-24    LIVER FUNCTIONS - ( 24 Jun 2020 05:34 )  Alb: 3.5 g/dL / Pro: 5.6 g/dL / ALK PHOS: 85 U/L / ALT: 28 U/L / AST: 43 U/L / GGT: x             Culture - Urine (collected 06-22-20 @ 20:09)  Source: .Urine Clean Catch (Midstream)  Final Report (06-23-20 @ 21:16):    <10,000 CFU/mL Normal Urogenital Karmen    Culture - Blood (collected 06-22-20 @ 18:26)  Source: .Blood Blood  Preliminary Report (06-24-20 @ 02:00):    No growth to date.    Culture - Blood (collected 06-22-20 @ 18:26)  Source: .Blood Blood  Preliminary Report (06-24-20 @ 02:00):    No growth to date.            RADIOLOGY & ADDITIONAL TESTS:  Personal review of radiological diagnostics performed  Echo and EKG results noted when applicable.     MEDICATIONS:  acetaminophen   Tablet .. 650 milliGRAM(s) Oral every 6 hours PRN  acyclovir   Oral Tab/Cap 400 milliGRAM(s) Oral two times a day  cholecalciferol 1000 Unit(s) Oral daily  folic acid 1 milliGRAM(s) Oral daily  lactated ringers. 1000 milliLiter(s) IV Continuous <Continuous>  latanoprost 0.005% Ophthalmic Solution 1 Drop(s) Both EYES at bedtime  meropenem  IVPB 500 milliGRAM(s) IV Intermittent every 8 hours  pantoprazole    Tablet 40 milliGRAM(s) Oral before breakfast  predniSONE   Tablet 70 milliGRAM(s) Oral daily  vancomycin    Solution 125 milliGRAM(s) Oral every 6 hours      ANTIBIOTICS:  acyclovir   Oral Tab/Cap 400 milliGRAM(s) Oral two times a day  meropenem  IVPB 500 milliGRAM(s) IV Intermittent every 8 hours  vancomycin    Solution 125 milliGRAM(s) Oral every 6 hours

## 2020-06-26 ENCOUNTER — TRANSCRIPTION ENCOUNTER (OUTPATIENT)
Age: 85
End: 2020-06-26

## 2020-06-26 VITALS
HEART RATE: 76 BPM | SYSTOLIC BLOOD PRESSURE: 148 MMHG | RESPIRATION RATE: 18 BRPM | TEMPERATURE: 96 F | DIASTOLIC BLOOD PRESSURE: 66 MMHG

## 2020-06-26 LAB
ANION GAP SERPL CALC-SCNC: 13 MMOL/L — SIGNIFICANT CHANGE UP (ref 7–14)
BASOPHILS # BLD AUTO: 0.01 K/UL — SIGNIFICANT CHANGE UP (ref 0–0.2)
BASOPHILS NFR BLD AUTO: 0.2 % — SIGNIFICANT CHANGE UP (ref 0–1)
BUN SERPL-MCNC: 29 MG/DL — HIGH (ref 10–20)
CALCIUM SERPL-MCNC: 8.4 MG/DL — LOW (ref 8.5–10.1)
CHLORIDE SERPL-SCNC: 106 MMOL/L — SIGNIFICANT CHANGE UP (ref 98–110)
CO2 SERPL-SCNC: 20 MMOL/L — SIGNIFICANT CHANGE UP (ref 17–32)
CREAT SERPL-MCNC: 0.8 MG/DL — SIGNIFICANT CHANGE UP (ref 0.7–1.5)
EOSINOPHIL # BLD AUTO: 0 K/UL — SIGNIFICANT CHANGE UP (ref 0–0.7)
EOSINOPHIL NFR BLD AUTO: 0 % — SIGNIFICANT CHANGE UP (ref 0–8)
GLUCOSE SERPL-MCNC: 122 MG/DL — HIGH (ref 70–99)
HCT VFR BLD CALC: 24.5 % — LOW (ref 37–47)
HGB BLD-MCNC: 7.8 G/DL — LOW (ref 12–16)
IMM GRANULOCYTES NFR BLD AUTO: 3.1 % — HIGH (ref 0.1–0.3)
LYMPHOCYTES # BLD AUTO: 0.68 K/UL — LOW (ref 1.2–3.4)
LYMPHOCYTES # BLD AUTO: 15.1 % — LOW (ref 20.5–51.1)
MAGNESIUM SERPL-MCNC: 2 MG/DL — SIGNIFICANT CHANGE UP (ref 1.8–2.4)
MCHC RBC-ENTMCNC: 27.2 PG — SIGNIFICANT CHANGE UP (ref 27–31)
MCHC RBC-ENTMCNC: 31.8 G/DL — LOW (ref 32–37)
MCV RBC AUTO: 85.4 FL — SIGNIFICANT CHANGE UP (ref 81–99)
MONOCYTES # BLD AUTO: 0.22 K/UL — SIGNIFICANT CHANGE UP (ref 0.1–0.6)
MONOCYTES NFR BLD AUTO: 4.9 % — SIGNIFICANT CHANGE UP (ref 1.7–9.3)
NEUTROPHILS # BLD AUTO: 3.45 K/UL — SIGNIFICANT CHANGE UP (ref 1.4–6.5)
NEUTROPHILS NFR BLD AUTO: 76.7 % — HIGH (ref 42.2–75.2)
NRBC # BLD: 0 /100 WBCS — SIGNIFICANT CHANGE UP (ref 0–0)
PLATELET # BLD AUTO: 27 K/UL — LOW (ref 130–400)
POTASSIUM SERPL-MCNC: 4.6 MMOL/L — SIGNIFICANT CHANGE UP (ref 3.5–5)
POTASSIUM SERPL-SCNC: 4.6 MMOL/L — SIGNIFICANT CHANGE UP (ref 3.5–5)
RBC # BLD: 2.87 M/UL — LOW (ref 4.2–5.4)
RBC # FLD: 16.2 % — HIGH (ref 11.5–14.5)
SODIUM SERPL-SCNC: 139 MMOL/L — SIGNIFICANT CHANGE UP (ref 135–146)
WBC # BLD: 4.5 K/UL — LOW (ref 4.8–10.8)
WBC # FLD AUTO: 4.5 K/UL — LOW (ref 4.8–10.8)

## 2020-06-26 PROCEDURE — 99233 SBSQ HOSP IP/OBS HIGH 50: CPT

## 2020-06-26 PROCEDURE — 99239 HOSP IP/OBS DSCHRG MGMT >30: CPT

## 2020-06-26 RX ORDER — DIPHENHYDRAMINE HCL 50 MG
25 CAPSULE ORAL ONCE
Refills: 0 | Status: COMPLETED | OUTPATIENT
Start: 2020-06-26 | End: 2020-06-26

## 2020-06-26 RX ORDER — ACYCLOVIR SODIUM 500 MG
1 VIAL (EA) INTRAVENOUS
Qty: 0 | Refills: 0 | DISCHARGE
Start: 2020-06-26

## 2020-06-26 RX ORDER — ACETAMINOPHEN 500 MG
2 TABLET ORAL
Qty: 0 | Refills: 0 | DISCHARGE
Start: 2020-06-26

## 2020-06-26 RX ORDER — FOSFOMYCIN TROMETHAMINE 3 G/1
3 POWDER ORAL ONCE
Refills: 0 | Status: COMPLETED | OUTPATIENT
Start: 2020-06-26 | End: 2020-06-26

## 2020-06-26 RX ORDER — ACYCLOVIR SODIUM 500 MG
1 VIAL (EA) INTRAVENOUS
Qty: 0 | Refills: 0 | DISCHARGE

## 2020-06-26 RX ADMIN — Medication 1000 UNIT(S): at 11:12

## 2020-06-26 RX ADMIN — Medication 400 MILLIGRAM(S): at 06:30

## 2020-06-26 RX ADMIN — FOSFOMYCIN TROMETHAMINE 3 GRAM(S): 3 POWDER ORAL at 15:57

## 2020-06-26 RX ADMIN — Medication 70 MILLIGRAM(S): at 06:29

## 2020-06-26 RX ADMIN — Medication 1 MILLIGRAM(S): at 11:12

## 2020-06-26 RX ADMIN — PANTOPRAZOLE SODIUM 40 MILLIGRAM(S): 20 TABLET, DELAYED RELEASE ORAL at 06:29

## 2020-06-26 RX ADMIN — MEROPENEM 100 MILLIGRAM(S): 1 INJECTION INTRAVENOUS at 06:29

## 2020-06-26 RX ADMIN — Medication 25 MILLIGRAM(S): at 01:26

## 2020-06-26 RX ADMIN — MEROPENEM 100 MILLIGRAM(S): 1 INJECTION INTRAVENOUS at 13:34

## 2020-06-26 RX ADMIN — Medication 125 MILLIGRAM(S): at 06:30

## 2020-06-26 NOTE — PROGRESS NOTE ADULT - SUBJECTIVE AND OBJECTIVE BOX
Patient is a 89y old  Female who presents with a chief complaint of Fever (24 Jun 2020 09:13)    INTERVAL HPI/OVERNIGHT EVENTS: feels better today each day, no more diarrhea. Still a bit weak.    ROS: Denies CP, SOB, AP  All other systems reviewed and are within normal limits.  InitialHPI:  89 year old female with a PMhx of diffuse large cell lymphoma, ITP, cervical disk inflammation.   Patient is on active chemotherapy for lymphoma. Her last session was one week prior at [facility] where she is getting Rituximab.   She has received 1 dose of rituximab for DLCL (1 week ago) and was scheduled for her second dose on 6/23.     CC: fever of 103 at home and chills    History goes back to: 1 day prior to admission when she started having chills and high grade fever.   She was at a barbecue with her relatives when she suddenly experienced these symptoms.   No exposure to COVID-19.      ROS is positive for: chronic urinary frequency    ROS is negative for: She reports no dysuria, no cough, no shortness of breath, no GI symptoms, no abdominal pain, no decrease PO intake.    Of note, patient had a UTI a few weeks ago and she completed a course of bactrim.     In the ED: Patient was hemodynamically stable, Clqp=315.1  WBC=2, and plts=22  UA --> mildly positive   CXR: no signs of acute cardiopulmonary disease (unofficial reading)  Patient got 1 dose of meropenem in the ED. (22 Jun 2020 22:52)    UTI(URINARY TRACT INFECTION),SEPSIS,THROMBOCYTOPENIA  ^UTI(URINARY TRACT INFECTION),SEPSIS,THROMBOCYTOPENIA  Handoff  MEWS Score  History of ITP  Overactive bladder  Diffuse large B cell lymphoma  UTI (urinary tract infection)  S/P total knee arthroplasty  FEVER  90+  Thrombocytopenia  Sepsis    PAST MEDICAL & SURGICAL HISTORY:  History of ITP  Overactive bladder  Diffuse large B cell lymphoma  S/P total knee arthroplasty: bilateral      General: NAD, AAO3  CV: S1 S2  Resp: decreased breath sounds at bases  GI: NT/ND/S +BS  MS: no clubbing/cyanosis/edema, 2+ pulses b/l  Neuro: NICHOLS, 2+reflexes thruout            MEDICATIONS  (STANDING):  acyclovir   Oral Tab/Cap 400 milliGRAM(s) Oral two times a day  cholecalciferol 1000 Unit(s) Oral daily  folic acid 1 milliGRAM(s) Oral daily  latanoprost 0.005% Ophthalmic Solution 1 Drop(s) Both EYES at bedtime  meropenem  IVPB 500 milliGRAM(s) IV Intermittent every 8 hours  pantoprazole    Tablet 40 milliGRAM(s) Oral before breakfast  predniSONE   Tablet 70 milliGRAM(s) Oral daily    MEDICATIONS  (PRN):  acetaminophen   Tablet .. 650 milliGRAM(s) Oral every 6 hours PRN Temp greater or equal to 38C (100.4F)    Home Medications:  acyclovir 400 mg oral tablet: 1 tab(s) orally 2 times a day (23 Jun 2020 00:06)  Centrum Silver oral tablet, chewable: 1 tab(s) orally once a day (23 Jun 2020 00:06)  folic acid 1 mg oral tablet: 1 tab(s) orally once a day (23 Jun 2020 00:06)  latanoprost 0.005% ophthalmic solution: 1 drop(s) to each affected eye once a day (in the evening) (23 Jun 2020 00:06)  Omega-3 oral capsule: 1 cap(s) orally once a day (23 Jun 2020 00:06)  Vitamin D3 1000 intl units (25 mcg) oral tablet: 1000 unit(s) orally once a day (23 Jun 2020 00:06)    Vital Signs Last 24 Hrs  T(C): 36.2 (26 Jun 2020 05:54), Max: 36.2 (26 Jun 2020 05:54)  T(F): 97.2 (26 Jun 2020 05:54), Max: 97.2 (26 Jun 2020 05:54)  HR: 82 (26 Jun 2020 05:54) (82 - 87)  BP: 152/71 (26 Jun 2020 05:54) (132/60 - 152/71)  BP(mean): 102 (26 Jun 2020 05:54) (102 - 102)  RR: 20 (26 Jun 2020 05:54) (18 - 20)  SpO2: --  CAPILLARY BLOOD GLUCOSE        LABS:                        7.8    4.50  )-----------( 27       ( 26 Jun 2020 04:52 )             24.5     06-26    139  |  106  |  29<H>  ----------------------------<  122<H>  4.6   |  20  |  0.8    Ca    8.4<L>      26 Jun 2020 04:52  Mg     2.0     06-26    TPro  5.3<L>  /  Alb  3.1<L>  /  TBili  x   /  DBili  x   /  AST  x   /  ALT  x   /  AlkPhos  x   06-24    LIVER FUNCTIONS - ( 24 Jun 2020 20:52 )  Alb: 3.1 g/dL / Pro: 5.3 g/dL / ALK PHOS: x     / ALT: x     / AST: x     / GGT: x                           Consultant Notes Reviewed:  [x ] YES  [ ] NO  Care Discussed with Consultants/Other Providers/ Housestaff [ x] YES  [ ] NO  Radiology, labs, new studies personally reviewed.

## 2020-06-26 NOTE — PROGRESS NOTE ADULT - ASSESSMENT
89 yo female with PMHx of low grade B cell lymphoma with bone marrow involvement,  Pt has been chronically neutropenic and thrombocytopenic  treated previously with Rituxan and steroids.    #Thrombocytopenia -Likely worsened due to sepsis vs drug induced (ws on bactrim recently) with history of Immune thrombocytopenia (was treated with one dose Rituxan 375mg/m2 on 6/16/2020).   -HIV, Hepatitis panel was recently checked on 6/10/2020 -Negative  C diff negative   Abx as per ID on discharge   She was started on Prednisone 1mg/kg on 6/24- Day 3 today and platelet count is slowly improving   Recommend to c/w prednisone daily for now until she sees her oncologist Dr Hernandez (pls arrange for follow up early next week si she can have CBC checks)   c/w protonix as she is on steroids   Transfuse platelet if less than 10k or if active bleeding.       #Chronic normocytic anemia   -Hb 9.2; baseline around 10  Low ret count . Normal B12, folate, SPEP, HAROON, hapto. 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

## 2020-06-26 NOTE — OCCUPATIONAL THERAPY INITIAL EVALUATION ADULT - AMBULATORY DEVICES NEEDED
As per kapil who was present for eval, pt uses straight cane pta but feels she is not steady with it 100% of the time/yes

## 2020-06-26 NOTE — PROGRESS NOTE ADULT - SUBJECTIVE AND OBJECTIVE BOX
NEYMAR WILLARD  89y, Female    All available historical data reviewed    OVERNIGHT EVENTS:  no fevers  minimal pruritus  no  complaints    ROS:  General: Denies rigors, night sweats  HEENT: Denies headache, rhinorrhea, sore throat, eye pain  CV: Denies CP, palpitations  PULM: Denies wheezing, hemoptysis  GI: Denies hematemesis, hematochezia, melena  : Denies discharge, hematuria  MSK: Denies arthralgias, myalgias  SKIN: Denies rash, lesions  NEURO: Denies paresthesias, weakness  PSYCH: Denies depression, anxiety    VITALS:  T(F): 97.2, Max: 97.2 (06-26-20 @ 05:54)  HR: 82  BP: 152/71  RR: 20Vital Signs Last 24 Hrs  T(C): 36.2 (26 Jun 2020 05:54), Max: 36.2 (26 Jun 2020 05:54)  T(F): 97.2 (26 Jun 2020 05:54), Max: 97.2 (26 Jun 2020 05:54)  HR: 82 (26 Jun 2020 05:54) (82 - 87)  BP: 152/71 (26 Jun 2020 05:54) (132/60 - 152/71)  BP(mean): 102 (26 Jun 2020 05:54) (102 - 102)  RR: 20 (26 Jun 2020 05:54) (18 - 20)  SpO2: --    TESTS & MEASUREMENTS:                        7.8    4.50  )-----------( 27       ( 26 Jun 2020 04:52 )             24.5     06-26    139  |  106  |  29<H>  ----------------------------<  122<H>  4.6   |  20  |  0.8    Ca    8.4<L>      26 Jun 2020 04:52  Mg     2.0     06-26    TPro  5.3<L>  /  Alb  3.1<L>  /  TBili  x   /  DBili  x   /  AST  x   /  ALT  x   /  AlkPhos  x   06-24    LIVER FUNCTIONS - ( 24 Jun 2020 20:52 )  Alb: 3.1 g/dL / Pro: 5.3 g/dL / ALK PHOS: x     / ALT: x     / AST: x     / GGT: x             Culture - Urine (collected 06-22-20 @ 20:09)  Source: .Urine Clean Catch (Midstream)  Final Report (06-23-20 @ 21:16):    <10,000 CFU/mL Normal Urogenital Karmen    Culture - Blood (collected 06-22-20 @ 18:26)  Source: .Blood Blood  Preliminary Report (06-24-20 @ 02:00):    No growth to date.    Culture - Blood (collected 06-22-20 @ 18:26)  Source: .Blood Blood  Preliminary Report (06-24-20 @ 02:00):    No growth to date.            RADIOLOGY & ADDITIONAL TESTS:  Personal review of radiological diagnostics performed  Echo and EKG results noted when applicable.     MEDICATIONS:  acetaminophen   Tablet .. 650 milliGRAM(s) Oral every 6 hours PRN  acyclovir   Oral Tab/Cap 400 milliGRAM(s) Oral two times a day  cholecalciferol 1000 Unit(s) Oral daily  folic acid 1 milliGRAM(s) Oral daily  latanoprost 0.005% Ophthalmic Solution 1 Drop(s) Both EYES at bedtime  meropenem  IVPB 500 milliGRAM(s) IV Intermittent every 8 hours  pantoprazole    Tablet 40 milliGRAM(s) Oral before breakfast  predniSONE   Tablet 70 milliGRAM(s) Oral daily      ANTIBIOTICS:  acyclovir   Oral Tab/Cap 400 milliGRAM(s) Oral two times a day  meropenem  IVPB 500 milliGRAM(s) IV Intermittent every 8 hours

## 2020-06-26 NOTE — PHYSICAL THERAPY INITIAL EVALUATION ADULT - PHYSICAL ASSIST/NONPHYSICAL ASSIST: SIT/STAND, REHAB EVAL
Medical Necessity Information: It is in your best interest to select a reason for this procedure from the list below. All of these items fulfill various CMS LCD requirements except the new and changing color options. Post-Care Instructions: I reviewed with the patient in detail post-care instructions. Patient is to wear sunprotection, and avoid picking at any of the treated lesions. Pt may apply Vaseline to crusted or scabbing areas. Medical Necessity Clause: This procedure was medically necessary because the lesions that were treated were: Add 52 Modifier (Optional): no Render Post-Care Instructions In Note?: yes Number Of Freeze-Thaw Cycles: 2 freeze-thaw cycles Detail Level: Detailed Consent: The patient's consent was obtained including but not limited to risks of crusting, scabbing, blistering, scarring, darker or lighter pigmentary change, recurrence, incomplete removal and infection. 1 person assist

## 2020-06-26 NOTE — OCCUPATIONAL THERAPY INITIAL EVALUATION ADULT - PERSONAL SAFETY AND JUDGMENT, REHAB EVAL
Pt demonstrating impulsivity during OT eval and poor safety awareness at times to increased speed of movement and distractability/at risk behaviors demonstrated

## 2020-06-26 NOTE — PROGRESS NOTE ADULT - SUBJECTIVE AND OBJECTIVE BOX
HPI  Patient is a 89y old Female who presents with a chief complaint of Fever and chills (25 Jun 2020 17:41)    Currently admitted to medicine with the primary diagnosis of UTI (urinary tract infection)     Today is hospital day 4d.     INTERVAL HPI / OVERNIGHT EVENTS:  Patient was examined and seen at bedside. This morning she is resting comfortably in bed. Patient was complaining of itch overnight and was given benadryl. Denies nausea, vomiting, diarrhea.     ROS: Otherwise unremarkable     PAST MEDICAL & SURGICAL HISTORY  History of ITP  Overactive bladder  Diffuse large B cell lymphoma  S/P total knee arthroplasty: bilateral    ALLERGIES  iodine (Unknown)  penicillins (Unknown)  Vicodin (Unknown)    MEDICATIONS  STANDING MEDICATIONS  acyclovir   Oral Tab/Cap 400 milliGRAM(s) Oral two times a day  cholecalciferol 1000 Unit(s) Oral daily  folic acid 1 milliGRAM(s) Oral daily  lactated ringers. 1000 milliLiter(s) IV Continuous <Continuous>  latanoprost 0.005% Ophthalmic Solution 1 Drop(s) Both EYES at bedtime  meropenem  IVPB 500 milliGRAM(s) IV Intermittent every 8 hours  pantoprazole    Tablet 40 milliGRAM(s) Oral before breakfast  predniSONE   Tablet 70 milliGRAM(s) Oral daily  vancomycin    Solution 125 milliGRAM(s) Oral every 6 hours    PRN MEDICATIONS  acetaminophen   Tablet .. 650 milliGRAM(s) Oral every 6 hours PRN    VITALS:  T(F): 97.2  HR: 82  BP: 152/71  RR: 20  SpO2: --    PHYSICAL EXAM  GEN: NAD, Resting comfortably in bed  PULM: Clear to auscultation bilaterally, No wheezes  CVS: Regular rate and rhythm, S1-S2, grade III diastolic murmur on RLSB  ABD: Soft, non-tender, non-distended, no guarding  EXT: No edema  NEURO: AAOx3, no focal deficits    LABS                        7.8    4.50  )-----------( 27       ( 26 Jun 2020 04:52 )             24.5     06-25    131<L>  |  99  |  26<H>  ----------------------------<  180<H>  4.3   |  18  |  1.0    Ca    8.2<L>      25 Jun 2020 11:14  Mg     1.8     06-25    TPro  5.3<L>  /  Alb  3.1<L>  /  TBili  x   /  DBili  x   /  AST  x   /  ALT  x   /  AlkPhos  x   06-24      RADIOLOGY    Assessment and Plan:   · Assessment		  89 year old female with a PMhx of diffuse large cell lymphoma (currently on rituximab last session 1 week ago, next due on 6/23/2) , ITP, cervical disk inflammation presenting for high grade fevers and chills at home. Patient is admitted for further evaluation and management of fever in the setting of immunosuppression.    #thrombocytopenia, possibly sepsis vs. bactrim induced   - negative HIV, hepatitis panel  - monitor plts, transfuse if <10 or <50 with active bleeding  - hold DVT prophylaxis  - meropenem as per ID reccs  - prednisone 75 mg PO daily with pantoprazole 40 mg daily, ID approves  - may consider IVIG or rituximab if plts don't improve with steroids as per heme onc reccs     #Acute pyelonephritis with sepsis present on admission  - blood and urine cx negative  - CXR negative COVID negative  - C.diff negative  -Treat with abx per ID  - Neutropenic precautions  - pt reports diarrhea episodes improved  - PO vancomycin 125 mg G6H    #Pancytopenia likely from rituximab  - previously completed course of rituxan 8 weeks 2 years ago, started another course last week (given first dose 6/16/20)     #Chronic normocytic anemia   -Hb 9.2; baseline around 10  -Check Vit B12, folate, haptoglobin, LDH, retic count, Zackary profile, SPEP, HAROON  -C/w folic acid supplementation    #Neutropenia with 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

## 2020-06-26 NOTE — PROGRESS NOTE ADULT - SUBJECTIVE AND OBJECTIVE BOX
Patient is a 89y old  Female who presents with a chief complaint of Fever and chills (2020 12:23)      Subjective: No more fevers/chills   Her loose BM is better and resolved   No new episodes of bleeding       Vital Signs Last 24 Hrs  T(C): 36.2 (2020 05:54), Max: 36.2 (2020 05:54)  T(F): 97.2 (2020 05:54), Max: 97.2 (2020 05:54)  HR: 82 (2020 05:54) (82 - 87)  BP: 152/71 (2020 05:54) (132/60 - 152/71)  BP(mean): 102 (2020 05:54) (102 - 102)  RR: 20 (2020 05:54) (18 - 20)  SpO2: --    PHYSICAL EXAM  General: adult in NAD  Neck: supple  CV: normal S1/S2   Lungs: positive air movement b/l ant lungs  Abdomen: soft non-tender   Ext: no clubbing cyanosis or edema  Skin: Bruising noted in b/l UE   Neuro: alert and oriented X 4, no focal deficits      MEDICATIONS  (STANDING):  acyclovir   Oral Tab/Cap 400 milliGRAM(s) Oral two times a day  cholecalciferol 1000 Unit(s) Oral daily  folic acid 1 milliGRAM(s) Oral daily  latanoprost 0.005% Ophthalmic Solution 1 Drop(s) Both EYES at bedtime  meropenem  IVPB 500 milliGRAM(s) IV Intermittent every 8 hours  pantoprazole    Tablet 40 milliGRAM(s) Oral before breakfast  predniSONE   Tablet 70 milliGRAM(s) Oral daily    MEDICATIONS  (PRN):  acetaminophen   Tablet .. 650 milliGRAM(s) Oral every 6 hours PRN Temp greater or equal to 38C (100.4F)      LABS:                          7.8    4.50  )-----------( 27       ( 2020 04:52 )             24.5         Mean Cell Volume : 85.4 fL  Mean Cell Hemoglobin : 27.2 pg  Mean Cell Hemoglobin Concentration : 31.8 g/dL  Auto Neutrophil # : 3.45 K/uL  Auto Lymphocyte # : 0.68 K/uL  Auto Monocyte # : 0.22 K/uL  Auto Eosinophil # : 0.00 K/uL  Auto Basophil # : 0.01 K/uL  Auto Neutrophil % : 76.7 %  Auto Lymphocyte % : 15.1 %  Auto Monocyte % : 4.9 %  Auto Eosinophil % : 0.0 %  Auto Basophil % : 0.2 %      Serial CBC's   @ 04:52  Hct-24.5 / Hgb-7.8 / Plat-27 / RBC-2.87 / WBC-4.50  Serial CBC's   @ 11:14  Hct-26.9 / Hgb-8.7 / Plat-18 / RBC-3.13 / WBC-6.56  Serial CBC's   @ 20:52  Hct-26.7 / Hgb-8.6 / Plat-15 / RBC-3.16 / WBC-5.54  Serial CBC's   @ 05:34  Hct-29.6 / Hgb-9.2 / Plat-15 / RBC-3.39 / WBC-2.16  Serial CBC's   @ 07:14  Hct-26.9 / Hgb-8.8 / Plat-17 / RBC-3.04 / WBC-1.92  Serial CBC's   @ 18:26  Hct-28.8 / Hgb-9.0 / Plat-22 / RBC-3.33 / WBC-2.63          139  |  106  |  29<H>  ----------------------------<  122<H>  4.6   |  20  |  0.8    Ca    8.4<L>      2020 04:52  Mg     2.0         TPro  5.3<L>  /  Alb  3.1<L>  /  TBili  x   /  DBili  x   /  AST  x   /  ALT  x   /  AlkPhos  x             Vitamin B12, Serum: 746 pg/mL ( @ 20:52)  Folate, Serum: >20.0 ng/mL ( @ 20:52)  Reticulocyte Percent: 1.5 % ( @ 20:52)      Serum Protein Electrophoresis Interp: Normal Electrophoresis Pattern ( @ 20:52)  Quantitative IgA: 55 mg/dL ( @ 20:52)  Quantitative Ig mg/dL ( @ 20:52)  Quantitative IgM: 79 mg/dL ( @ 20:52)  Immunofixation, Serum:   No Monoclonal Band Identified    Reference Range: None Detected ( @ 20:52)

## 2020-06-26 NOTE — DISCHARGE NOTE NURSING/CASE MANAGEMENT/SOCIAL WORK - PATIENT PORTAL LINK FT
You can access the FollowMyHealth Patient Portal offered by [facility] by registering at the following website: [email or web address]. By joining [employer]’s FollowMyHealth portal, you will also be able to view your health information using other applications (apps) compatible with our system.

## 2020-06-26 NOTE — PROGRESS NOTE ADULT - ASSESSMENT
89 year old female with a PMhx of diffuse large cell lymphoma (currently on rituximab last session 1 week ago, next due on 6/23/2) , ITP, cervical disk inflammation.   Patient is presenting for high grade fevers and chills at home  In the ED, Zdjc=989.1, hemodynamically stable and AAOx3  Patient is admitted for further evaluation and management of fever in the setting of immunosuppression     # Sepsis/Neuropenic Fever   -Possible UTI/Pyelo. UA is not convincing , no dysuria  (however patient has chronic urinary frequency which could mask her symptoms). Additionally, patient completed a course of bactrim 2 weeks ago for UTI.  -CXR normal   -renal u/s w/ PVR negative  -c. dif negative  -was on Chari, ID recommend Forphomycin x1    # Pancytopenia -  Pancytopenia likely from rituximab. Resolved   # Thrombocytopenia, likely multifactorial in the setting of Hx of ITP, rituximab -  Patient unsure when the last time her plts were normal.   Possible element of bactrim induced thrombocytopenia  She had previously completed a course of steroids from her PCP (Dr. Aj) for possible ITP  - no active signs of bleeding. Monitor for bleeding  - hold DVT prophylaxis  - PO Prednisone as per heme (cleared by ID)  -keep Plts>10 or >50 if active bleeding (currently none)  -heme f/u for further plan    # DLCL - on rituximab  2nd dose was due on 6/23/20  - continue acyclovir prophylaxis for VZV/HSV in the setting of immunosuppression  - continue folic acid  -outpt f/u    #Misc  - DVT Prophylaxis: SCDs in the setting of thrombocytopenia,  - Diet: regular  - Activity: as tolerated  - Code Status: Full Code    SOCIAL: patient lives alone, walks with a cane, comfortable with daily personal care.     Oncologist: Dr. Michael Hernandez - [facility]  PCP: Dr. Liu Aj.     #Progress Note Handoff  Pending (specify):  Clearance from Heme_Clinical improvement and stability__x___Tests_____PT____x____  Pt/Family discussion: Pt informed and agrees with the current plan  Disposition: Home___x___/

## 2020-06-26 NOTE — OCCUPATIONAL THERAPY INITIAL EVALUATION ADULT - LIVES WITH, PROFILE
alone/PT lives alone in [place].  4STE, 13 steps down to basement and up to bedroom/full bath, +tub, +shower chair

## 2020-06-26 NOTE — PHYSICAL THERAPY INITIAL EVALUATION ADULT - GENERAL OBSERVATIONS, REHAB EVAL
Pt's platelet count is 15, therefore recommend to hold PT at this time as discussed in a.m. rounds. Will f/u for PT as appropriate.
8:00-8:30. chart reviewed. Pt received sitting at B/S chair, alert, oriented, able to follow multi-step instructions and agreeable to PT evaluation. + IV, on RA , stable vitals, SOBWE (HGB 7.8, platelets 27), denies pain or discomfort. NAD. Pt is impulsive with high risk of falling

## 2020-06-26 NOTE — PROGRESS NOTE ADULT - ATTENDING COMMENTS
She is doing well. She has ecchymosis on her limbs no other site of bleeding.      Impression/Plan:  #Acute Thrombocytopenia, multifactorial  and due to recurrent itp, possibly  secondary and bone marrow suppression due to infection  -on prednisone and Rituxan as outpatient  -plts going up  -will need to follow up with her heme and they can manage her steroids and Rituxan      #Normocytic anemia maybe due to her NHL? retic is low, no hemolysis   -check iron studies with ferritin   -may need a bone marrow if  hgb does not respond to Rituxan

## 2020-06-26 NOTE — DISCHARGE NOTE PROVIDER - CARE PROVIDER_API CALL
Michael Hernandez  [address] [place] [address]  Phone: [phone]  Fax: (   )    -  Follow Up Time: 1 week    JACOBY CHRISTIANSON  [id number]  [address] [place] [address]  Phone: ()-  Fax: ()-  Follow Up Time: 2 weeks    Jean Marie [address] [address]  Phone: [phone]  Fax: [phone]  Follow Up Time: 1 week

## 2020-06-26 NOTE — PHYSICAL THERAPY INITIAL EVALUATION ADULT - PERTINENT HX OF CURRENT PROBLEM, REHAB EVAL
89 year old female with a PMhx of diffuse large cell lymphoma (currently on rituximab last session 1 week ago, next due on 6/23/2) , ITP, cervical disk inflammation presenting for high grade fevers and chills at home. Patient is admitted for further evaluation and management of fever in the setting of immunosuppression.

## 2020-06-26 NOTE — OCCUPATIONAL THERAPY INITIAL EVALUATION ADULT - GENERAL OBSERVATIONS, REHAB EVAL
PT rec'd standing at closet with family member who is RN at this facility, +IV lock.  PT agreeable to OT titus.

## 2020-06-26 NOTE — OCCUPATIONAL THERAPY INITIAL EVALUATION ADULT - ADDITIONAL COMMENTS
Pt's kapil present for OT titus.  Expressed concerns about patient living alone in home with +stairs.

## 2020-06-26 NOTE — DISCHARGE NOTE PROVIDER - PROVIDER TOKENS
FREE:[LAST:[Mary],FIRST:[Michael MATTHEWS],PHONE:[phone]],FAX:[(   [phone]    -],ADDRESS:[[address] [place] [address]],FOLLOWUP:[1 week]],PROVIDER:[TOKEN:[[id number]],FOLLOWUP:[2 weeks]],PROVIDER:[TOKEN:[[id number]],FOLLOWUP:[1 week]]

## 2020-06-26 NOTE — OCCUPATIONAL THERAPY INITIAL EVALUATION ADULT - RANGE OF MOTION EXAMINATION, UPPER EXTREMITY
AROM of R shoulder limited to ~1/2 range 2* h/o rotator cuff repair as per granddaughter/Left UE Active ROM was WFL (within functional limits)

## 2020-06-26 NOTE — DISCHARGE NOTE PROVIDER - NSDCFUSCHEDAPPT_GEN_ALL_CORE_FT
NEYMAR WILLARD ; 07/16/2020 ; [facility] [id number] NEYMAR WILLARD ; 07/16/2020 ; [facility] [id number] NEYMAR WILLARD ; 07/16/2020 ; [facility] [id number] NEYMAR WILLARD ; 07/16/2020 ; [facility] [id number] NEYMAR WILLARD ; 07/16/2020 ; [facility] [id number]

## 2020-06-26 NOTE — DISCHARGE NOTE PROVIDER - NSDCCPCAREPLAN_GEN_ALL_CORE_FT
PRINCIPAL DISCHARGE DIAGNOSIS  Diagnosis: UTI (urinary tract infection)  Assessment and Plan of Treatment: Your symptoms were due to a urinary tract infection which was treated with antiobiotics. Follow up outpatient with a urologist for your urinary and bladder symptoms.      SECONDARY DISCHARGE DIAGNOSES  Diagnosis: Thrombocytopenia  Assessment and Plan of Treatment: The decrease in your platelets was likely due to your urinary tract infection. Make sure to follow up with your hematologist/oncologist in one week to repeat blood work and monitor your platelet levels. Continue the steroid medication prescribed.    Diagnosis: Sepsis  Assessment and Plan of Treatment: You developed an infection in your blood from your urinary tract infection. You received and completed your antibiotics course. Make sure to follow up with your primary care doctor regularly. PRINCIPAL DISCHARGE DIAGNOSIS  Diagnosis: UTI (urinary tract infection)  Assessment and Plan of Treatment: Your symptoms were due to a urinary tract infection which was treated with antiobiotics. Follow up outpatient with a urologist for your urinary and bladder symptoms.      SECONDARY DISCHARGE DIAGNOSES  Diagnosis: Thrombocytopenia  Assessment and Plan of Treatment: Please continue taking Prednisone.. Make sure to follow up with your hematologist/oncologist within one week to repeat blood work and monitor your platelet levels.    Diagnosis: Sepsis  Assessment and Plan of Treatment: You developed an infection in your blood from your urinary tract infection. You received and completed your antibiotics course. Make sure to follow up with your primary care doctor regularly.

## 2020-06-26 NOTE — PROGRESS NOTE ADULT - ASSESSMENT
· Assessment		  89 yo female with PMHx of DLBCL, ITP, presented for fever with chills.     IMPRESSION;   #Resolved Sepsis secondary to possible acute pyelonephritis  No pyuria ( was on po Bactrim 2 wks PTA )  Doubt drug induced fevers ( would expect a rash )  Not Bactrim induced thrombocytopenia as would be very unusual after 2 weeks  No PNA  Blood & Urine cxs NGTD 6/22  COVID-19 NG 6/22    diarrhea : soft BM. Drug induced. CD NG    RECOMMENDATIONS;   po FOSFOMYCIN 3 GM ONE DOSE only  d/c meropenem  recall prn please

## 2020-06-26 NOTE — DISCHARGE NOTE PROVIDER - HOSPITAL COURSE
89 year old female with a PMhx of diffuse large cell lymphoma, ITP, cervical disk inflammation. Patient is on active chemotherapy for lymphoma. Her last session was one week prior at [facility] where she is getting Rituximab. She has received 1 dose of rituximab for DLCL (1 week ago) and was scheduled for her second dose on 6/23. History goes back to: 1 day prior to admission when she started having chills and high grade fever. She was at a barbecue with her relatives when she suddenly experienced these symptoms. No exposure to COVID-19. ROS is positive for: chronic urinary frequency. ROS is negative for: She reports no dysuria, no cough, no shortness of breath, no GI symptoms, no abdominal pain, no decrease PO intake. Of note, patient had a UTI a few weeks ago and she completed a course of bactrim. In the ED: Patient was hemodynamically stable, Iqsx=117.1    WBC=2, and plts=22, UA --> mildly positive , CXR: no signs of acute cardiopulmonary disease (unofficial reading), Patient got 1 dose of meropenem in the ED. Admitted for sepsis. Labs indicated neutropenia, pancytopenia, and thrombocytopenia. Pt was given ceftriaxone and cefepime but then switch to meropenem. Patient was given prednisone and protonix as per ID and heme-onc recommendations. Patient also developed multiple episodes of diarrhea during her stay and started on vancomycin, eventually discontinued after negative c.diff panel. Fever, chills, neutropenia resolved and patient was discharged.

## 2020-06-26 NOTE — CHART NOTE - NSCHARTNOTEFT_GEN_A_CORE
<<<RESIDENT DISCHARGE NOTE>>>     NEYMAR WILLARD  MRN-[id number]    VITAL SIGNS:  T(F): 96.1 (06-26-20 @ 15:47), Max: 97.2 (06-26-20 @ 05:54)  HR: 76 (06-26-20 @ 15:47)  BP: 148/66 (06-26-20 @ 15:47)  SpO2: --  Weight (kg): 73.6 (06-26-20 @ 12:23)  BMI (kg/m2): 28.7 (06-26-20 @ 12:23)    PHYSICAL EXAMINATION:  GEN: NAD, Resting comfortably in bed  PULM: Clear to auscultation bilaterally, No wheezes  CVS: Regular rate and rhythm, S1-S2, no murmurs  ABD: Soft, non-tender, non-distended, no guarding  EXT: No edema  NEURO: AAOx3, no focal deficits    TEST RESULTS:                        7.8    4.50  )-----------( 27       ( 26 Jun 2020 04:52 )             24.5       06-26    139  |  106  |  29<H>  ----------------------------<  122<H>  4.6   |  20  |  0.8    Ca    8.4<L>      26 Jun 2020 04:52  Mg     2.0     06-26    TPro  5.3<L>  /  Alb  3.1<L>  /  TBili  x   /  DBili  x   /  AST  x   /  ALT  x   /  AlkPhos  x   06-24      Patient seen and evaluated at bedside. No overnight acute events. Patient is ready for discharge.    FINAL DISCHARGE INTERVIEW:  Resident(s) Present: Jonatan Gibbs MD    DISPOSITION:   [ x ] Home,    [  ] Home with Visiting Nursing Services,   [    ]  SNF/ NH,    [   ] Acute Rehab (4A),   [   ] Other (Specify:_________)

## 2020-06-28 LAB
CULTURE RESULTS: SIGNIFICANT CHANGE UP
CULTURE RESULTS: SIGNIFICANT CHANGE UP
SPECIMEN SOURCE: SIGNIFICANT CHANGE UP
SPECIMEN SOURCE: SIGNIFICANT CHANGE UP

## 2020-06-29 LAB
CHROM ANALY INTERPHASE BLD FISH-IMP: SIGNIFICANT CHANGE UP
TM INTERPRETATION: SIGNIFICANT CHANGE UP

## 2020-06-30 DIAGNOSIS — C83.30 DIFFUSE LARGE B-CELL LYMPHOMA, UNSPECIFIED SITE: ICD-10-CM

## 2020-06-30 DIAGNOSIS — Z88.0 ALLERGY STATUS TO PENICILLIN: ICD-10-CM

## 2020-06-30 DIAGNOSIS — A41.9 SEPSIS, UNSPECIFIED ORGANISM: ICD-10-CM

## 2020-06-30 DIAGNOSIS — R35.0 FREQUENCY OF MICTURITION: ICD-10-CM

## 2020-06-30 DIAGNOSIS — D69.3 IMMUNE THROMBOCYTOPENIC PURPURA: ICD-10-CM

## 2020-06-30 DIAGNOSIS — N10 ACUTE PYELONEPHRITIS: ICD-10-CM

## 2020-06-30 DIAGNOSIS — Z90.710 ACQUIRED ABSENCE OF BOTH CERVIX AND UTERUS: ICD-10-CM

## 2020-06-30 DIAGNOSIS — R19.7 DIARRHEA, UNSPECIFIED: ICD-10-CM

## 2020-06-30 DIAGNOSIS — D89.9 DISORDER INVOLVING THE IMMUNE MECHANISM, UNSPECIFIED: ICD-10-CM

## 2020-06-30 DIAGNOSIS — D63.0 ANEMIA IN NEOPLASTIC DISEASE: ICD-10-CM

## 2020-06-30 DIAGNOSIS — Z90.49 ACQUIRED ABSENCE OF OTHER SPECIFIED PARTS OF DIGESTIVE TRACT: ICD-10-CM

## 2020-06-30 DIAGNOSIS — D61.810 ANTINEOPLASTIC CHEMOTHERAPY INDUCED PANCYTOPENIA: ICD-10-CM

## 2020-06-30 DIAGNOSIS — N17.9 ACUTE KIDNEY FAILURE, UNSPECIFIED: ICD-10-CM

## 2020-06-30 LAB
CULTURE RESULTS: SIGNIFICANT CHANGE UP
SPECIMEN SOURCE: SIGNIFICANT CHANGE UP

## 2020-07-01 LAB — TM INTERPRETATION: SIGNIFICANT CHANGE UP

## 2020-07-09 LAB — CHROM ANALY OVERALL INTERP SPEC-IMP: SIGNIFICANT CHANGE UP

## 2020-07-16 ENCOUNTER — APPOINTMENT (OUTPATIENT)
Dept: GASTROENTEROLOGY | Facility: CLINIC | Age: 85
End: 2020-07-16

## 2020-09-19 PROBLEM — Z86.2 PERSONAL HISTORY OF DISEASES OF THE BLOOD AND BLOOD-FORMING ORGANS AND CERTAIN DISORDERS INVOLVING THE IMMUNE MECHANISM: Chronic | Status: ACTIVE | Noted: 2020-06-23

## 2020-09-19 PROBLEM — C83.30 DIFFUSE LARGE B-CELL LYMPHOMA, UNSPECIFIED SITE: Chronic | Status: ACTIVE | Noted: 2020-06-23

## 2020-09-19 PROBLEM — N32.81 OVERACTIVE BLADDER: Chronic | Status: ACTIVE | Noted: 2020-06-23

## 2020-09-19 NOTE — ED PROVIDER NOTE - NS ED ROS FT
CONST: No fever, chills or bodyaches  EYES: No pain, redness, drainage or visual changes.  ENT: No ear pain or discharge, nasal discharge or congestion. No sore throat  CARD: No chest pain, palpitations  RESP: No SOB, cough  GI: No abdominal pain, N/V/D  MS: No joint pain, back pain or extremity pain/injury  PVS: bilateral leg swelling, R>L  SKIN: No rashes  NEURO: No headache, dizziness, paresthesias or LOC

## 2020-09-19 NOTE — ED PROVIDER NOTE - ATTENDING CONTRIBUTION TO CARE
89 yo with pmh of b cell lymphoma, previously on rituxamab with [facility], but stopped after development of ITP, now on prednisone taper, overactive bladder, presents with c/o b/l LE swelling, R>L.  pt says she usually wears compression stockings, but did not wear them for the last few days and noted her legs swollen x 2 days.  pt denies sob, cp, abd pain.  pt admits was on oxybutinin but was experiencing sensation of retention, so stopped a few days ago.  no dysuria, no hematuria.  exam: nad, ncat, perrl, eomi, mmm, rrr, ctab, abd soft, nt,nd aox3, b/l LE pitting edema R>L, no calf tenderness imp: pt with b/l LE swelling, r/o dvt, labs, bnp, cxr

## 2020-09-19 NOTE — ED PROVIDER NOTE - CLINICAL SUMMARY MEDICAL DECISION MAKING FREE TEXT BOX
pt with peripheral edema, likle 2/2 noncoompliance with compression stockings and diuretids, pt to f/u with dr. figueroa outpt

## 2020-09-19 NOTE — ED PROVIDER NOTE - PROGRESS NOTE DETAILS
updated pt and family on lab findings and imaging. Discussed with Dr. Lindasy, partner of Dr Aj, will call pt on Monday to see in office on Monday or Tuesday. Will not start any meds until evaluation.

## 2020-09-19 NOTE — ED ADULT NURSE NOTE - CHPI ED NUR SYMPTOMS NEG
no nausea/no weakness/no fever/no pain/no chills/no decreased eating/drinking/no tingling/no dizziness/no vomiting

## 2020-09-19 NOTE — ED PROVIDER NOTE - OBJECTIVE STATEMENT
89yo female with PMHx B cell lymphoma (care at [facility]), neuropathy, previously on rituxamab, but stopped after development of ITP, now on prednisone taper, overactive bladder, presents with c/o b/l LE swelling, R>L.  pt says she usually wears compression stockings, but did not wear them for the last few days and noted her legs swollen x 2 days. Pt also reports to often sitting at kitchen island and not elevating legs throughout day. Denies orthopnea, CP, redness, warmth to legs. Pt admits was on oxybutinin but was experiencing sensation of retention, so stopped a few days ago.

## 2020-09-19 NOTE — ED PROVIDER NOTE - PHYSICAL EXAMINATION
CONST: Well appearing in NAD  NECK: Non-tender, no meningeal signs  CARD: Normal S1 S2; Normal rate and rhythm  RESP: Equal BS B/L, No wheezes, rhonchi or rales. No distress  GI: Soft, non-tender, non-distended.  MS: Normal ROM in all extremities. No midline spinal tenderness.  SKIN: Warm, dry, no acute rashes. Good turgor  PVS: 2+ pitting edema bilateral extremities, good pulses, no warmth or erythema. No weeping. FROM without bony tenderness  NEURO: A&Ox3, No focal deficits. Strength 5/5 with no sensory deficits.

## 2020-09-19 NOTE — ED ADULT NURSE NOTE - OBJECTIVE STATEMENT
Patient c/o B/L LE swelling since yesterday. Denies CP/SOB/nausea/vomiting/fever/chills. On assessment B/L LE pitting edema noted- skin shiny, no s/s of redness noted.

## 2020-09-19 NOTE — ED PROVIDER NOTE - NSFOLLOWUPINSTRUCTIONS_ED_ALL_ED_FT
YOUR CASE WAS DISCUSSED WITH DR. CHRISTIANSON, PARTNER OF DR. QUIÑONES. THEIR OFFICE WILL CALL YOU ON MONDAY TO BE SEEN IN OFFICE EITHER MONDAY OR TUESDAY.           LEG EDEMA - Ambulatory Care           Leg Edema    AMBULATORY CARE:    Leg edema is swelling caused by fluid buildup. Your legs may swell if you sit or stand for long periods of time, are pregnant, or are injured. Swelling may also occur if you have heart failure or circulation problems. This means that your heart does not pump blood through your body as it should.    Call your local emergency number (911 in the ) for any of the following:   •You cannot walk.      •You have chest pain or trouble breathing that is worse when you lie down.      •You suddenly feel lightheaded and have trouble breathing.      •You have new and sudden chest pain. You may have more pain when you take deep breaths or cough.      •You cough up blood.      Seek care immediately if:   •You feel faint or confused.      •Your skin turns blue or gray.      •Your leg feels warm, tender, and painful. It may be swollen and red.      Call your doctor if:   •You have a fever or feel more tired than usual.      •The veins in your legs look larger than usual. They may look full or bulging.      •Your legs itch or feel heavy.      •You have red or white areas or sores on your legs. The skin may also appear dimpled or have indentations.      •You are gaining weight.      •You have trouble moving your ankles.      •The swelling does not go away, or other parts of your body swell.      •You have questions or concerns about your condition or care.      Self-care:   •Elevate your legs. Raise your legs above the level of your heart as often as you can. This will help decrease swelling and pain. Prop your legs on pillows or blankets to keep them elevated comfortably.  Elevate Leg           •Wear pressure stockings, if directed. These tight stockings put pressure on your legs to promote blood flow and prevent blood clots. Put them on before you get out of bed. Wear the stockings during the day. Do not wear them while you sleep.  Pressure Stockings            •Stay active. Do not stand or sit for long periods of time. Ask your healthcare provider about the best exercise plan for you.  Walking for Exercise           •Eat healthy foods. Healthy foods include fruits, vegetables, whole-grain breads, low-fat dairy products, beans, lean meats, and fish. Ask if you need to be on a special diet.  Healthy Foods           •Limit sodium (salt). Salt will make your body hold even more fluid. Your healthcare provider will tell you how many milligrams (mg) of salt you can have each day.             Follow up with your doctor as directed: Write down your questions so you remember to ask them during your visits.

## 2020-09-19 NOTE — ED PROVIDER NOTE - CARE PROVIDER_API CALL
Jean [id number] [place] [address]  Phone: [phone]  Fax: [phone]  Follow Up Time:

## 2020-09-19 NOTE — ED PROVIDER NOTE - PATIENT PORTAL LINK FT
You can access the FollowMyHealth Patient Portal offered by [facility] by registering at the following website: [email or web address]. By joining [employer]’s FollowMyHealth portal, you will also be able to view your health information using other applications (apps) compatible with our system.

## 2021-01-01 ENCOUNTER — INPATIENT (INPATIENT)
Facility: HOSPITAL | Age: 86
LOS: 5 days | End: 2021-09-03
Attending: INTERNAL MEDICINE | Admitting: INTERNAL MEDICINE
Payer: MEDICARE

## 2021-01-01 VITALS
HEIGHT: 63 IN | SYSTOLIC BLOOD PRESSURE: 150 MMHG | HEART RATE: 90 BPM | WEIGHT: 179.9 LBS | OXYGEN SATURATION: 98 % | TEMPERATURE: 101 F | DIASTOLIC BLOOD PRESSURE: 62 MMHG | RESPIRATION RATE: 18 BRPM

## 2021-01-01 VITALS — RESPIRATION RATE: 28 BRPM | OXYGEN SATURATION: 75 %

## 2021-01-01 DIAGNOSIS — D69.3 IMMUNE THROMBOCYTOPENIC PURPURA: ICD-10-CM

## 2021-01-01 DIAGNOSIS — D61.818 OTHER PANCYTOPENIA: ICD-10-CM

## 2021-01-01 DIAGNOSIS — E87.1 HYPO-OSMOLALITY AND HYPONATREMIA: ICD-10-CM

## 2021-01-01 DIAGNOSIS — E66.9 OBESITY, UNSPECIFIED: ICD-10-CM

## 2021-01-01 DIAGNOSIS — N39.0 URINARY TRACT INFECTION, SITE NOT SPECIFIED: ICD-10-CM

## 2021-01-01 DIAGNOSIS — Z96.659 PRESENCE OF UNSPECIFIED ARTIFICIAL KNEE JOINT: Chronic | ICD-10-CM

## 2021-01-01 DIAGNOSIS — I46.9 CARDIAC ARREST, CAUSE UNSPECIFIED: ICD-10-CM

## 2021-01-01 DIAGNOSIS — T50.995A ADVERSE EFFECT OF OTHER DRUGS, MEDICAMENTS AND BIOLOGICAL SUBSTANCES, INITIAL ENCOUNTER: ICD-10-CM

## 2021-01-01 DIAGNOSIS — Z88.0 ALLERGY STATUS TO PENICILLIN: ICD-10-CM

## 2021-01-01 DIAGNOSIS — C81.90 HODGKIN LYMPHOMA, UNSPECIFIED, UNSPECIFIED SITE: ICD-10-CM

## 2021-01-01 DIAGNOSIS — N17.9 ACUTE KIDNEY FAILURE, UNSPECIFIED: ICD-10-CM

## 2021-01-01 DIAGNOSIS — I50.22 CHRONIC SYSTOLIC (CONGESTIVE) HEART FAILURE: ICD-10-CM

## 2021-01-01 DIAGNOSIS — D61.811 OTHER DRUG-INDUCED PANCYTOPENIA: ICD-10-CM

## 2021-01-01 LAB
ALBUMIN SERPL ELPH-MCNC: 2.2 G/DL — LOW (ref 3.5–5.2)
ALBUMIN SERPL ELPH-MCNC: 2.9 G/DL — LOW (ref 3.5–5.2)
ALBUMIN SERPL ELPH-MCNC: 3 G/DL — LOW (ref 3.5–5.2)
ALBUMIN SERPL ELPH-MCNC: 3 G/DL — LOW (ref 3.5–5.2)
ALBUMIN SERPL ELPH-MCNC: 3.4 G/DL — LOW (ref 3.5–5.2)
ALBUMIN SERPL ELPH-MCNC: 3.4 G/DL — LOW (ref 3.5–5.2)
ALLERGY+IMMUNOLOGY DIAG STUDY NOTE: SIGNIFICANT CHANGE UP
ALP SERPL-CCNC: 64 U/L — SIGNIFICANT CHANGE UP (ref 30–115)
ALP SERPL-CCNC: 68 U/L — SIGNIFICANT CHANGE UP (ref 30–115)
ALP SERPL-CCNC: 70 U/L — SIGNIFICANT CHANGE UP (ref 30–115)
ALP SERPL-CCNC: 76 U/L — SIGNIFICANT CHANGE UP (ref 30–115)
ALT FLD-CCNC: 17 U/L — SIGNIFICANT CHANGE UP (ref 0–41)
ALT FLD-CCNC: 17 U/L — SIGNIFICANT CHANGE UP (ref 0–41)
ALT FLD-CCNC: 19 U/L — SIGNIFICANT CHANGE UP (ref 0–41)
ALT FLD-CCNC: 20 U/L — SIGNIFICANT CHANGE UP (ref 0–41)
ALT FLD-CCNC: 26 U/L — SIGNIFICANT CHANGE UP (ref 0–41)
ALT FLD-CCNC: 54 U/L — HIGH (ref 0–41)
ANION GAP SERPL CALC-SCNC: 11 MMOL/L — SIGNIFICANT CHANGE UP (ref 7–14)
ANION GAP SERPL CALC-SCNC: 12 MMOL/L — SIGNIFICANT CHANGE UP (ref 7–14)
ANION GAP SERPL CALC-SCNC: 13 MMOL/L — SIGNIFICANT CHANGE UP (ref 7–14)
ANION GAP SERPL CALC-SCNC: 14 MMOL/L — SIGNIFICANT CHANGE UP (ref 7–14)
ANION GAP SERPL CALC-SCNC: 14 MMOL/L — SIGNIFICANT CHANGE UP (ref 7–14)
ANION GAP SERPL CALC-SCNC: 16 MMOL/L — HIGH (ref 7–14)
ANION GAP SERPL CALC-SCNC: 19 MMOL/L — HIGH (ref 7–14)
APPEARANCE UR: CLEAR — SIGNIFICANT CHANGE UP
APTT BLD: 27.2 SEC — SIGNIFICANT CHANGE UP (ref 27–39.2)
AST SERPL-CCNC: 18 U/L — SIGNIFICANT CHANGE UP (ref 0–41)
AST SERPL-CCNC: 21 U/L — SIGNIFICANT CHANGE UP (ref 0–41)
AST SERPL-CCNC: 24 U/L — SIGNIFICANT CHANGE UP (ref 0–41)
AST SERPL-CCNC: 28 U/L — SIGNIFICANT CHANGE UP (ref 0–41)
AST SERPL-CCNC: 28 U/L — SIGNIFICANT CHANGE UP (ref 0–41)
AST SERPL-CCNC: 90 U/L — HIGH (ref 0–41)
BACTERIA # UR AUTO: ABNORMAL
BASOPHILS # BLD AUTO: 0.01 K/UL — SIGNIFICANT CHANGE UP (ref 0–0.2)
BASOPHILS NFR BLD AUTO: 0.2 % — SIGNIFICANT CHANGE UP (ref 0–1)
BILIRUB SERPL-MCNC: 0.4 MG/DL — SIGNIFICANT CHANGE UP (ref 0.2–1.2)
BILIRUB SERPL-MCNC: 0.6 MG/DL — SIGNIFICANT CHANGE UP (ref 0.2–1.2)
BILIRUB SERPL-MCNC: 0.7 MG/DL — SIGNIFICANT CHANGE UP (ref 0.2–1.2)
BILIRUB SERPL-MCNC: 0.7 MG/DL — SIGNIFICANT CHANGE UP (ref 0.2–1.2)
BILIRUB SERPL-MCNC: 0.8 MG/DL — SIGNIFICANT CHANGE UP (ref 0.2–1.2)
BILIRUB SERPL-MCNC: 1.2 MG/DL — SIGNIFICANT CHANGE UP (ref 0.2–1.2)
BILIRUB UR-MCNC: NEGATIVE — SIGNIFICANT CHANGE UP
BLD GP AB SCN SERPL QL: SIGNIFICANT CHANGE UP
BUN SERPL-MCNC: 20 MG/DL — SIGNIFICANT CHANGE UP (ref 10–20)
BUN SERPL-MCNC: 23 MG/DL — HIGH (ref 10–20)
BUN SERPL-MCNC: 25 MG/DL — HIGH (ref 10–20)
BUN SERPL-MCNC: 29 MG/DL — HIGH (ref 10–20)
BUN SERPL-MCNC: 37 MG/DL — HIGH (ref 10–20)
BUN SERPL-MCNC: 43 MG/DL — HIGH (ref 10–20)
BUN SERPL-MCNC: 53 MG/DL — HIGH (ref 10–20)
BUN SERPL-MCNC: 58 MG/DL — HIGH (ref 10–20)
BUN SERPL-MCNC: 59 MG/DL — HIGH (ref 10–20)
C DIFF BY PCR RESULT: NEGATIVE — SIGNIFICANT CHANGE UP
C DIFF TOX GENS STL QL NAA+PROBE: SIGNIFICANT CHANGE UP
CALCIUM SERPL-MCNC: 8.2 MG/DL — LOW (ref 8.5–10.1)
CALCIUM SERPL-MCNC: 8.5 MG/DL — SIGNIFICANT CHANGE UP (ref 8.5–10.1)
CALCIUM SERPL-MCNC: 8.6 MG/DL — SIGNIFICANT CHANGE UP (ref 8.5–10.1)
CALCIUM SERPL-MCNC: 8.7 MG/DL — SIGNIFICANT CHANGE UP (ref 8.5–10.1)
CALCIUM SERPL-MCNC: 8.7 MG/DL — SIGNIFICANT CHANGE UP (ref 8.5–10.1)
CALCIUM SERPL-MCNC: 8.9 MG/DL — SIGNIFICANT CHANGE UP (ref 8.5–10.1)
CALCIUM SERPL-MCNC: 9 MG/DL — SIGNIFICANT CHANGE UP (ref 8.5–10.1)
CALCIUM SERPL-MCNC: 9.2 MG/DL — SIGNIFICANT CHANGE UP (ref 8.5–10.1)
CALCIUM SERPL-MCNC: 9.4 MG/DL — SIGNIFICANT CHANGE UP (ref 8.5–10.1)
CHLORIDE SERPL-SCNC: 87 MMOL/L — LOW (ref 98–110)
CHLORIDE SERPL-SCNC: 87 MMOL/L — LOW (ref 98–110)
CHLORIDE SERPL-SCNC: 88 MMOL/L — LOW (ref 98–110)
CHLORIDE SERPL-SCNC: 90 MMOL/L — LOW (ref 98–110)
CHLORIDE SERPL-SCNC: 91 MMOL/L — LOW (ref 98–110)
CHLORIDE SERPL-SCNC: 92 MMOL/L — LOW (ref 98–110)
CHLORIDE SERPL-SCNC: 93 MMOL/L — LOW (ref 98–110)
CHLORIDE SERPL-SCNC: 95 MMOL/L — LOW (ref 98–110)
CHLORIDE SERPL-SCNC: 97 MMOL/L — LOW (ref 98–110)
CK MB CFR SERPL CALC: 5.3 NG/ML — SIGNIFICANT CHANGE UP (ref 0.6–6.3)
CK SERPL-CCNC: 156 U/L — SIGNIFICANT CHANGE UP (ref 0–225)
CO2 SERPL-SCNC: 15 MMOL/L — LOW (ref 17–32)
CO2 SERPL-SCNC: 19 MMOL/L — SIGNIFICANT CHANGE UP (ref 17–32)
CO2 SERPL-SCNC: 21 MMOL/L — SIGNIFICANT CHANGE UP (ref 17–32)
CO2 SERPL-SCNC: 22 MMOL/L — SIGNIFICANT CHANGE UP (ref 17–32)
CO2 SERPL-SCNC: 22 MMOL/L — SIGNIFICANT CHANGE UP (ref 17–32)
CO2 SERPL-SCNC: 23 MMOL/L — SIGNIFICANT CHANGE UP (ref 17–32)
CO2 SERPL-SCNC: 24 MMOL/L — SIGNIFICANT CHANGE UP (ref 17–32)
COLOR SPEC: YELLOW — SIGNIFICANT CHANGE UP
COVID-19 SPIKE DOMAIN AB INTERP: POSITIVE
COVID-19 SPIKE DOMAIN ANTIBODY RESULT: 34.4 U/ML — HIGH
CREAT SERPL-MCNC: 1 MG/DL — SIGNIFICANT CHANGE UP (ref 0.7–1.5)
CREAT SERPL-MCNC: 1.2 MG/DL — SIGNIFICANT CHANGE UP (ref 0.7–1.5)
CREAT SERPL-MCNC: 1.2 MG/DL — SIGNIFICANT CHANGE UP (ref 0.7–1.5)
CREAT SERPL-MCNC: 1.3 MG/DL — SIGNIFICANT CHANGE UP (ref 0.7–1.5)
CREAT SERPL-MCNC: 1.8 MG/DL — HIGH (ref 0.7–1.5)
CREAT SERPL-MCNC: 2.1 MG/DL — HIGH (ref 0.7–1.5)
CREAT SERPL-MCNC: 2.7 MG/DL — HIGH (ref 0.7–1.5)
CREAT SERPL-MCNC: 2.8 MG/DL — HIGH (ref 0.7–1.5)
CREAT SERPL-MCNC: 3.6 MG/DL — HIGH (ref 0.7–1.5)
CULTURE RESULTS: SIGNIFICANT CHANGE UP
DIFF PNL FLD: ABNORMAL
DIR ANTIGLOB POLYSPECIFIC INTERPRETATION: SIGNIFICANT CHANGE UP
EOSINOPHIL # BLD AUTO: 0.01 K/UL — SIGNIFICANT CHANGE UP (ref 0–0.7)
EOSINOPHIL NFR BLD AUTO: 0.2 % — SIGNIFICANT CHANGE UP (ref 0–8)
EPI CELLS # UR: ABNORMAL /HPF
FERRITIN SERPL-MCNC: 441 NG/ML — HIGH (ref 15–150)
GAS PNL BLDA: SIGNIFICANT CHANGE UP
GLUCOSE SERPL-MCNC: 110 MG/DL — HIGH (ref 70–99)
GLUCOSE SERPL-MCNC: 112 MG/DL — HIGH (ref 70–99)
GLUCOSE SERPL-MCNC: 114 MG/DL — HIGH (ref 70–99)
GLUCOSE SERPL-MCNC: 133 MG/DL — HIGH (ref 70–99)
GLUCOSE SERPL-MCNC: 135 MG/DL — HIGH (ref 70–99)
GLUCOSE SERPL-MCNC: 144 MG/DL — HIGH (ref 70–99)
GLUCOSE SERPL-MCNC: 147 MG/DL — HIGH (ref 70–99)
GLUCOSE SERPL-MCNC: 165 MG/DL — HIGH (ref 70–99)
GLUCOSE SERPL-MCNC: 248 MG/DL — HIGH (ref 70–99)
GLUCOSE UR QL: NEGATIVE MG/DL — SIGNIFICANT CHANGE UP
HCT VFR BLD CALC: 21.6 % — LOW (ref 37–47)
HCT VFR BLD CALC: 26.4 % — LOW (ref 37–47)
HCT VFR BLD CALC: 28.3 % — LOW (ref 37–47)
HCT VFR BLD CALC: 29.5 % — LOW (ref 37–47)
HCT VFR BLD CALC: 29.8 % — LOW (ref 37–47)
HCT VFR BLD CALC: 30.6 % — LOW (ref 37–47)
HCT VFR BLD CALC: 31 % — LOW (ref 37–47)
HCT VFR BLD CALC: 31.2 % — LOW (ref 37–47)
HCT VFR BLD CALC: 31.8 % — LOW (ref 37–47)
HGB BLD-MCNC: 10 G/DL — LOW (ref 12–16)
HGB BLD-MCNC: 10 G/DL — LOW (ref 12–16)
HGB BLD-MCNC: 10.1 G/DL — LOW (ref 12–16)
HGB BLD-MCNC: 10.1 G/DL — LOW (ref 12–16)
HGB BLD-MCNC: 6.6 G/DL — CRITICAL LOW (ref 12–16)
HGB BLD-MCNC: 7.8 G/DL — LOW (ref 12–16)
HGB BLD-MCNC: 9.2 G/DL — LOW (ref 12–16)
HGB BLD-MCNC: 9.5 G/DL — LOW (ref 12–16)
HGB BLD-MCNC: 9.9 G/DL — LOW (ref 12–16)
IMM GRANULOCYTES NFR BLD AUTO: 2.3 % — HIGH (ref 0.1–0.3)
INR BLD: 1.21 RATIO — SIGNIFICANT CHANGE UP (ref 0.65–1.3)
INR BLD: 1.32 RATIO — HIGH (ref 0.65–1.3)
IRON SATN MFR SERPL: 33 % — SIGNIFICANT CHANGE UP (ref 15–50)
IRON SATN MFR SERPL: 51 UG/DL — SIGNIFICANT CHANGE UP (ref 35–150)
KETONES UR-MCNC: NEGATIVE — SIGNIFICANT CHANGE UP
LACTATE SERPL-SCNC: 1.7 MMOL/L — SIGNIFICANT CHANGE UP (ref 0.7–2)
LACTATE SERPL-SCNC: 11.3 MMOL/L — CRITICAL HIGH (ref 0.7–2)
LEUKOCYTE ESTERASE UR-ACNC: ABNORMAL
LYMPHOCYTES # BLD AUTO: 0.53 K/UL — LOW (ref 1.2–3.4)
LYMPHOCYTES # BLD AUTO: 12.1 % — LOW (ref 20.5–51.1)
MCHC RBC-ENTMCNC: 24.6 PG — LOW (ref 27–31)
MCHC RBC-ENTMCNC: 26 PG — LOW (ref 27–31)
MCHC RBC-ENTMCNC: 26.1 PG — LOW (ref 27–31)
MCHC RBC-ENTMCNC: 26.3 PG — LOW (ref 27–31)
MCHC RBC-ENTMCNC: 26.4 PG — LOW (ref 27–31)
MCHC RBC-ENTMCNC: 26.4 PG — LOW (ref 27–31)
MCHC RBC-ENTMCNC: 26.5 PG — LOW (ref 27–31)
MCHC RBC-ENTMCNC: 29.5 G/DL — LOW (ref 32–37)
MCHC RBC-ENTMCNC: 30.6 G/DL — LOW (ref 32–37)
MCHC RBC-ENTMCNC: 31.8 G/DL — LOW (ref 32–37)
MCHC RBC-ENTMCNC: 32.1 G/DL — SIGNIFICANT CHANGE UP (ref 32–37)
MCHC RBC-ENTMCNC: 32.2 G/DL — SIGNIFICANT CHANGE UP (ref 32–37)
MCHC RBC-ENTMCNC: 32.3 G/DL — SIGNIFICANT CHANGE UP (ref 32–37)
MCHC RBC-ENTMCNC: 32.5 G/DL — SIGNIFICANT CHANGE UP (ref 32–37)
MCHC RBC-ENTMCNC: 33 G/DL — SIGNIFICANT CHANGE UP (ref 32–37)
MCHC RBC-ENTMCNC: 33.2 G/DL — SIGNIFICANT CHANGE UP (ref 32–37)
MCV RBC AUTO: 79.3 FL — LOW (ref 81–99)
MCV RBC AUTO: 80.1 FL — LOW (ref 81–99)
MCV RBC AUTO: 80.4 FL — LOW (ref 81–99)
MCV RBC AUTO: 80.6 FL — LOW (ref 81–99)
MCV RBC AUTO: 80.8 FL — LOW (ref 81–99)
MCV RBC AUTO: 80.9 FL — LOW (ref 81–99)
MCV RBC AUTO: 82.5 FL — SIGNIFICANT CHANGE UP (ref 81–99)
MCV RBC AUTO: 83 FL — SIGNIFICANT CHANGE UP (ref 81–99)
MCV RBC AUTO: 88.3 FL — SIGNIFICANT CHANGE UP (ref 81–99)
MONOCYTES # BLD AUTO: 0.64 K/UL — HIGH (ref 0.1–0.6)
MONOCYTES NFR BLD AUTO: 14.6 % — HIGH (ref 1.7–9.3)
NEUTROPHILS # BLD AUTO: 3.1 K/UL — SIGNIFICANT CHANGE UP (ref 1.4–6.5)
NEUTROPHILS NFR BLD AUTO: 70.6 % — SIGNIFICANT CHANGE UP (ref 42.2–75.2)
NITRITE UR-MCNC: NEGATIVE — SIGNIFICANT CHANGE UP
NRBC # BLD: 0 /100 WBCS — SIGNIFICANT CHANGE UP (ref 0–0)
NT-PROBNP SERPL-SCNC: 2907 PG/ML — HIGH (ref 0–300)
NT-PROBNP SERPL-SCNC: HIGH PG/ML (ref 0–300)
OB PNL STL: NEGATIVE — SIGNIFICANT CHANGE UP
OSMOLALITY SERPL: 270 MOS/KG — LOW (ref 280–301)
OSMOLALITY SERPL: 272 MOS/KG — LOW (ref 280–301)
PH UR: 5.5 — SIGNIFICANT CHANGE UP (ref 5–8)
PLATELET # BLD AUTO: 103 K/UL — LOW (ref 130–400)
PLATELET # BLD AUTO: 68 K/UL — LOW (ref 130–400)
PLATELET # BLD AUTO: 73 K/UL — LOW (ref 130–400)
PLATELET # BLD AUTO: 75 K/UL — LOW (ref 130–400)
PLATELET # BLD AUTO: 77 K/UL — LOW (ref 130–400)
PLATELET # BLD AUTO: 77 K/UL — LOW (ref 130–400)
PLATELET # BLD AUTO: 82 K/UL — LOW (ref 130–400)
PLATELET # BLD AUTO: 92 K/UL — LOW (ref 130–400)
PLATELET # BLD AUTO: 98 K/UL — LOW (ref 130–400)
POTASSIUM SERPL-MCNC: 4 MMOL/L — SIGNIFICANT CHANGE UP (ref 3.5–5)
POTASSIUM SERPL-MCNC: 4.1 MMOL/L — SIGNIFICANT CHANGE UP (ref 3.5–5)
POTASSIUM SERPL-MCNC: 4.6 MMOL/L — SIGNIFICANT CHANGE UP (ref 3.5–5)
POTASSIUM SERPL-MCNC: 4.7 MMOL/L — SIGNIFICANT CHANGE UP (ref 3.5–5)
POTASSIUM SERPL-MCNC: 4.7 MMOL/L — SIGNIFICANT CHANGE UP (ref 3.5–5)
POTASSIUM SERPL-MCNC: 4.8 MMOL/L — SIGNIFICANT CHANGE UP (ref 3.5–5)
POTASSIUM SERPL-MCNC: 4.9 MMOL/L — SIGNIFICANT CHANGE UP (ref 3.5–5)
POTASSIUM SERPL-MCNC: 5.1 MMOL/L — HIGH (ref 3.5–5)
POTASSIUM SERPL-MCNC: 5.8 MMOL/L — HIGH (ref 3.5–5)
POTASSIUM SERPL-SCNC: 4 MMOL/L — SIGNIFICANT CHANGE UP (ref 3.5–5)
POTASSIUM SERPL-SCNC: 4.1 MMOL/L — SIGNIFICANT CHANGE UP (ref 3.5–5)
POTASSIUM SERPL-SCNC: 4.6 MMOL/L — SIGNIFICANT CHANGE UP (ref 3.5–5)
POTASSIUM SERPL-SCNC: 4.7 MMOL/L — SIGNIFICANT CHANGE UP (ref 3.5–5)
POTASSIUM SERPL-SCNC: 4.7 MMOL/L — SIGNIFICANT CHANGE UP (ref 3.5–5)
POTASSIUM SERPL-SCNC: 4.8 MMOL/L — SIGNIFICANT CHANGE UP (ref 3.5–5)
POTASSIUM SERPL-SCNC: 4.9 MMOL/L — SIGNIFICANT CHANGE UP (ref 3.5–5)
POTASSIUM SERPL-SCNC: 5.1 MMOL/L — HIGH (ref 3.5–5)
POTASSIUM SERPL-SCNC: 5.8 MMOL/L — HIGH (ref 3.5–5)
PROT SERPL-MCNC: 3.6 G/DL — LOW (ref 6–8)
PROT SERPL-MCNC: 4.8 G/DL — LOW (ref 6–8)
PROT SERPL-MCNC: 4.9 G/DL — LOW (ref 6–8)
PROT SERPL-MCNC: 5 G/DL — LOW (ref 6–8)
PROT SERPL-MCNC: 5.3 G/DL — LOW (ref 6–8)
PROT SERPL-MCNC: 5.5 G/DL — LOW (ref 6–8)
PROT UR-MCNC: 100 MG/DL
PROTHROM AB SERPL-ACNC: 13.9 SEC — HIGH (ref 9.95–12.87)
PROTHROM AB SERPL-ACNC: 15.1 SEC — HIGH (ref 9.95–12.87)
RBC # BLD: 2.68 M/UL — LOW (ref 4.2–5.4)
RBC # BLD: 2.99 M/UL — LOW (ref 4.2–5.4)
RBC # BLD: 3.52 M/UL — LOW (ref 4.2–5.4)
RBC # BLD: 3.65 M/UL — LOW (ref 4.2–5.4)
RBC # BLD: 3.76 M/UL — LOW (ref 4.2–5.4)
RBC # BLD: 3.78 M/UL — LOW (ref 4.2–5.4)
RBC # BLD: 3.82 M/UL — LOW (ref 4.2–5.4)
RBC # BLD: 3.83 M/UL — LOW (ref 4.2–5.4)
RBC # BLD: 3.83 M/UL — LOW (ref 4.2–5.4)
RBC # FLD: 16.7 % — HIGH (ref 11.5–14.5)
RBC # FLD: 16.8 % — HIGH (ref 11.5–14.5)
RBC # FLD: 17 % — HIGH (ref 11.5–14.5)
RBC # FLD: 17.1 % — HIGH (ref 11.5–14.5)
RBC # FLD: 17.2 % — HIGH (ref 11.5–14.5)
RBC # FLD: 17.5 % — HIGH (ref 11.5–14.5)
RBC # FLD: 17.7 % — HIGH (ref 11.5–14.5)
RBC # FLD: 17.9 % — HIGH (ref 11.5–14.5)
RBC # FLD: 18.4 % — HIGH (ref 11.5–14.5)
RBC CASTS # UR COMP ASSIST: ABNORMAL /HPF
SARS-COV-2 IGG+IGM SERPL QL IA: 34.4 U/ML — HIGH
SARS-COV-2 IGG+IGM SERPL QL IA: POSITIVE
SARS-COV-2 RNA SPEC QL NAA+PROBE: SIGNIFICANT CHANGE UP
SODIUM SERPL-SCNC: 122 MMOL/L — LOW (ref 135–146)
SODIUM SERPL-SCNC: 123 MMOL/L — LOW (ref 135–146)
SODIUM SERPL-SCNC: 125 MMOL/L — LOW (ref 135–146)
SODIUM SERPL-SCNC: 127 MMOL/L — LOW (ref 135–146)
SODIUM SERPL-SCNC: 128 MMOL/L — LOW (ref 135–146)
SODIUM SERPL-SCNC: 129 MMOL/L — LOW (ref 135–146)
SODIUM SERPL-SCNC: 133 MMOL/L — LOW (ref 135–146)
SP GR SPEC: 1.02 — SIGNIFICANT CHANGE UP (ref 1.01–1.03)
SPECIMEN SOURCE: SIGNIFICANT CHANGE UP
TIBC SERPL-MCNC: 154 UG/DL — LOW (ref 220–430)
TROPONIN T SERPL-MCNC: 0.02 NG/ML — HIGH
TROPONIN T SERPL-MCNC: 0.03 NG/ML — CRITICAL HIGH
TROPONIN T SERPL-MCNC: 0.62 NG/ML — CRITICAL HIGH
UIBC SERPL-MCNC: 103 UG/DL — LOW (ref 110–370)
UROBILINOGEN FLD QL: 0.2 MG/DL — SIGNIFICANT CHANGE UP (ref 0.2–0.2)
WBC # BLD: 17.61 K/UL — HIGH (ref 4.8–10.8)
WBC # BLD: 4.39 K/UL — LOW (ref 4.8–10.8)
WBC # BLD: 4.71 K/UL — LOW (ref 4.8–10.8)
WBC # BLD: 5.06 K/UL — SIGNIFICANT CHANGE UP (ref 4.8–10.8)
WBC # BLD: 5.46 K/UL — SIGNIFICANT CHANGE UP (ref 4.8–10.8)
WBC # BLD: 5.65 K/UL — SIGNIFICANT CHANGE UP (ref 4.8–10.8)
WBC # BLD: 6.4 K/UL — SIGNIFICANT CHANGE UP (ref 4.8–10.8)
WBC # BLD: 8.61 K/UL — SIGNIFICANT CHANGE UP (ref 4.8–10.8)
WBC # BLD: 8.67 K/UL — SIGNIFICANT CHANGE UP (ref 4.8–10.8)
WBC # FLD AUTO: 17.61 K/UL — HIGH (ref 4.8–10.8)
WBC # FLD AUTO: 4.39 K/UL — LOW (ref 4.8–10.8)
WBC # FLD AUTO: 4.71 K/UL — LOW (ref 4.8–10.8)
WBC # FLD AUTO: 5.06 K/UL — SIGNIFICANT CHANGE UP (ref 4.8–10.8)
WBC # FLD AUTO: 5.46 K/UL — SIGNIFICANT CHANGE UP (ref 4.8–10.8)
WBC # FLD AUTO: 5.65 K/UL — SIGNIFICANT CHANGE UP (ref 4.8–10.8)
WBC # FLD AUTO: 6.4 K/UL — SIGNIFICANT CHANGE UP (ref 4.8–10.8)
WBC # FLD AUTO: 8.61 K/UL — SIGNIFICANT CHANGE UP (ref 4.8–10.8)
WBC # FLD AUTO: 8.67 K/UL — SIGNIFICANT CHANGE UP (ref 4.8–10.8)
WBC UR QL: >50 /HPF

## 2021-01-01 PROCEDURE — 99233 SBSQ HOSP IP/OBS HIGH 50: CPT

## 2021-01-01 PROCEDURE — 99223 1ST HOSP IP/OBS HIGH 75: CPT

## 2021-01-01 PROCEDURE — 99222 1ST HOSP IP/OBS MODERATE 55: CPT

## 2021-01-01 PROCEDURE — 71045 X-RAY EXAM CHEST 1 VIEW: CPT | Mod: 26

## 2021-01-01 PROCEDURE — 99223 1ST HOSP IP/OBS HIGH 75: CPT | Mod: AI

## 2021-01-01 PROCEDURE — 86077 PHYS BLOOD BANK SERV XMATCH: CPT

## 2021-01-01 PROCEDURE — 74019 RADEX ABDOMEN 2 VIEWS: CPT | Mod: 26

## 2021-01-01 PROCEDURE — 74176 CT ABD & PELVIS W/O CONTRAST: CPT | Mod: 26

## 2021-01-01 PROCEDURE — 99232 SBSQ HOSP IP/OBS MODERATE 35: CPT

## 2021-01-01 PROCEDURE — 93010 ELECTROCARDIOGRAM REPORT: CPT

## 2021-01-01 PROCEDURE — 76775 US EXAM ABDO BACK WALL LIM: CPT | Mod: 26

## 2021-01-01 PROCEDURE — 99285 EMERGENCY DEPT VISIT HI MDM: CPT

## 2021-01-01 PROCEDURE — 93306 TTE W/DOPPLER COMPLETE: CPT | Mod: 26

## 2021-01-01 PROCEDURE — 36556 INSERT NON-TUNNEL CV CATH: CPT

## 2021-01-01 RX ORDER — IBRUTINIB 140 MG/1
280 TABLET, FILM COATED ORAL DAILY
Refills: 0 | Status: DISCONTINUED | OUTPATIENT
Start: 2021-01-01 | End: 2021-01-01

## 2021-01-01 RX ORDER — SENNA PLUS 8.6 MG/1
2 TABLET ORAL AT BEDTIME
Refills: 0 | Status: DISCONTINUED | OUTPATIENT
Start: 2021-01-01 | End: 2021-01-01

## 2021-01-01 RX ORDER — PANTOPRAZOLE SODIUM 20 MG/1
40 TABLET, DELAYED RELEASE ORAL
Refills: 0 | Status: DISCONTINUED | OUTPATIENT
Start: 2021-01-01 | End: 2021-01-01

## 2021-01-01 RX ORDER — LANOLIN ALCOHOL/MO/W.PET/CERES
3 CREAM (GRAM) TOPICAL ONCE
Refills: 0 | Status: COMPLETED | OUTPATIENT
Start: 2021-01-01 | End: 2021-01-01

## 2021-01-01 RX ORDER — CHOLECALCIFEROL (VITAMIN D3) 125 MCG
1000 CAPSULE ORAL
Qty: 0 | Refills: 0 | DISCHARGE

## 2021-01-01 RX ORDER — SODIUM BICARBONATE 1 MEQ/ML
0.3 SYRINGE (ML) INTRAVENOUS
Qty: 150 | Refills: 0 | Status: DISCONTINUED | OUTPATIENT
Start: 2021-01-01 | End: 2021-01-01

## 2021-01-01 RX ORDER — LATANOPROST 0.05 MG/ML
1 SOLUTION/ DROPS OPHTHALMIC; TOPICAL AT BEDTIME
Refills: 0 | Status: DISCONTINUED | OUTPATIENT
Start: 2021-01-01 | End: 2021-01-01

## 2021-01-01 RX ORDER — DIPHENHYDRAMINE HCL 50 MG
50 CAPSULE ORAL ONCE
Refills: 0 | Status: DISCONTINUED | OUTPATIENT
Start: 2021-01-01 | End: 2021-01-01

## 2021-01-01 RX ORDER — SODIUM CHLORIDE 9 MG/ML
1 INJECTION INTRAMUSCULAR; INTRAVENOUS; SUBCUTANEOUS ONCE
Refills: 0 | Status: COMPLETED | OUTPATIENT
Start: 2021-01-01 | End: 2021-01-01

## 2021-01-01 RX ORDER — FERROUS SULFATE 325(65) MG
325 TABLET ORAL
Refills: 0 | Status: DISCONTINUED | OUTPATIENT
Start: 2021-01-01 | End: 2021-01-01

## 2021-01-01 RX ORDER — ONDANSETRON 8 MG/1
4 TABLET, FILM COATED ORAL EVERY 6 HOURS
Refills: 0 | Status: DISCONTINUED | OUTPATIENT
Start: 2021-01-01 | End: 2021-01-01

## 2021-01-01 RX ORDER — ACETAMINOPHEN 500 MG
650 TABLET ORAL EVERY 6 HOURS
Refills: 0 | Status: DISCONTINUED | OUTPATIENT
Start: 2021-01-01 | End: 2021-01-01

## 2021-01-01 RX ORDER — IRON POLYSACCHARIDE COMPLEX 150 MG
1 CAPSULE ORAL
Qty: 0 | Refills: 0 | DISCHARGE

## 2021-01-01 RX ORDER — FUROSEMIDE 40 MG
20 TABLET ORAL ONCE
Refills: 0 | Status: COMPLETED | OUTPATIENT
Start: 2021-01-01 | End: 2021-01-01

## 2021-01-01 RX ORDER — LATANOPROST 0.05 MG/ML
1 SOLUTION/ DROPS OPHTHALMIC; TOPICAL
Qty: 0 | Refills: 0 | DISCHARGE

## 2021-01-01 RX ORDER — SODIUM CHLORIDE 9 MG/ML
1 INJECTION INTRAMUSCULAR; INTRAVENOUS; SUBCUTANEOUS THREE TIMES A DAY
Refills: 0 | Status: DISCONTINUED | OUTPATIENT
Start: 2021-01-01 | End: 2021-01-01

## 2021-01-01 RX ORDER — CEFTRIAXONE 500 MG/1
1000 INJECTION, POWDER, FOR SOLUTION INTRAMUSCULAR; INTRAVENOUS EVERY 24 HOURS
Refills: 0 | Status: DISCONTINUED | OUTPATIENT
Start: 2021-01-01 | End: 2021-01-01

## 2021-01-01 RX ORDER — IBUPROFEN 200 MG
600 TABLET ORAL ONCE
Refills: 0 | Status: COMPLETED | OUTPATIENT
Start: 2021-01-01 | End: 2021-01-01

## 2021-01-01 RX ORDER — IOHEXOL 300 MG/ML
30 INJECTION, SOLUTION INTRAVENOUS ONCE
Refills: 0 | Status: COMPLETED | OUTPATIENT
Start: 2021-01-01 | End: 2021-01-01

## 2021-01-01 RX ORDER — CEFTRIAXONE 500 MG/1
1000 INJECTION, POWDER, FOR SOLUTION INTRAMUSCULAR; INTRAVENOUS EVERY 24 HOURS
Refills: 0 | Status: COMPLETED | OUTPATIENT
Start: 2021-01-01 | End: 2021-01-01

## 2021-01-01 RX ORDER — FOLIC ACID 0.8 MG
1 TABLET ORAL DAILY
Refills: 0 | Status: DISCONTINUED | OUTPATIENT
Start: 2021-01-01 | End: 2021-01-01

## 2021-01-01 RX ORDER — HYDROXYZINE HCL 10 MG
25 TABLET ORAL ONCE
Refills: 0 | Status: COMPLETED | OUTPATIENT
Start: 2021-01-01 | End: 2021-01-01

## 2021-01-01 RX ORDER — DIPHENHYDRAMINE HCL 50 MG
25 CAPSULE ORAL EVERY 8 HOURS
Refills: 0 | Status: DISCONTINUED | OUTPATIENT
Start: 2021-01-01 | End: 2021-01-01

## 2021-01-01 RX ORDER — POTASSIUM CHLORIDE 20 MEQ
1 PACKET (EA) ORAL
Qty: 0 | Refills: 0 | DISCHARGE

## 2021-01-01 RX ORDER — OMEGA-3 ACID ETHYL ESTERS 1 G
1 CAPSULE ORAL
Qty: 0 | Refills: 0 | DISCHARGE

## 2021-01-01 RX ORDER — ALLOPURINOL 300 MG
300 TABLET ORAL DAILY
Refills: 0 | Status: DISCONTINUED | OUTPATIENT
Start: 2021-01-01 | End: 2021-01-01

## 2021-01-01 RX ORDER — ONDANSETRON 8 MG/1
4 TABLET, FILM COATED ORAL ONCE
Refills: 0 | Status: COMPLETED | OUTPATIENT
Start: 2021-01-01 | End: 2021-01-01

## 2021-01-01 RX ORDER — SODIUM CHLORIDE 9 MG/ML
1000 INJECTION INTRAMUSCULAR; INTRAVENOUS; SUBCUTANEOUS
Refills: 0 | Status: DISCONTINUED | OUTPATIENT
Start: 2021-01-01 | End: 2021-01-01

## 2021-01-01 RX ORDER — MULTIVIT-MIN/FERROUS GLUCONATE 9 MG/15 ML
1 LIQUID (ML) ORAL
Qty: 0 | Refills: 0 | DISCHARGE

## 2021-01-01 RX ORDER — CEFTRIAXONE 500 MG/1
1000 INJECTION, POWDER, FOR SOLUTION INTRAMUSCULAR; INTRAVENOUS ONCE
Refills: 0 | Status: COMPLETED | OUTPATIENT
Start: 2021-01-01 | End: 2021-01-01

## 2021-01-01 RX ORDER — POLYETHYLENE GLYCOL 3350 17 G/17G
17 POWDER, FOR SOLUTION ORAL DAILY
Refills: 0 | Status: DISCONTINUED | OUTPATIENT
Start: 2021-01-01 | End: 2021-01-01

## 2021-01-01 RX ORDER — FOLIC ACID 0.8 MG
1 TABLET ORAL
Qty: 0 | Refills: 0 | DISCHARGE

## 2021-01-01 RX ORDER — IPRATROPIUM/ALBUTEROL SULFATE 18-103MCG
3 AEROSOL WITH ADAPTER (GRAM) INHALATION EVERY 6 HOURS
Refills: 0 | Status: DISCONTINUED | OUTPATIENT
Start: 2021-01-01 | End: 2021-01-01

## 2021-01-01 RX ORDER — HYDROCORTISONE 1 %
1 OINTMENT (GRAM) TOPICAL DAILY
Refills: 0 | Status: DISCONTINUED | OUTPATIENT
Start: 2021-01-01 | End: 2021-01-01

## 2021-01-01 RX ORDER — HALOPERIDOL DECANOATE 100 MG/ML
2 INJECTION INTRAMUSCULAR ONCE
Refills: 0 | Status: COMPLETED | OUTPATIENT
Start: 2021-01-01 | End: 2021-01-01

## 2021-01-01 RX ORDER — CHOLECALCIFEROL (VITAMIN D3) 125 MCG
1000 CAPSULE ORAL DAILY
Refills: 0 | Status: DISCONTINUED | OUTPATIENT
Start: 2021-01-01 | End: 2021-01-01

## 2021-01-01 RX ORDER — IBUPROFEN 200 MG
400 TABLET ORAL EVERY 6 HOURS
Refills: 0 | Status: DISCONTINUED | OUTPATIENT
Start: 2021-01-01 | End: 2021-01-01

## 2021-01-01 RX ORDER — IBRUTINIB 140 MG/1
2 TABLET, FILM COATED ORAL
Qty: 0 | Refills: 0 | DISCHARGE

## 2021-01-01 RX ORDER — SODIUM CHLORIDE 9 MG/ML
1000 INJECTION, SOLUTION INTRAVENOUS ONCE
Refills: 0 | Status: COMPLETED | OUTPATIENT
Start: 2021-01-01 | End: 2021-01-01

## 2021-01-01 RX ORDER — SODIUM BICARBONATE 1 MEQ/ML
50 SYRINGE (ML) INTRAVENOUS
Refills: 0 | Status: DISCONTINUED | OUTPATIENT
Start: 2021-01-01 | End: 2021-01-01

## 2021-01-01 RX ORDER — ALLOPURINOL 300 MG
1 TABLET ORAL
Qty: 0 | Refills: 0 | DISCHARGE

## 2021-01-01 RX ADMIN — Medication 325 MILLIGRAM(S): at 17:25

## 2021-01-01 RX ADMIN — Medication 1 MILLIGRAM(S): at 11:54

## 2021-01-01 RX ADMIN — ONDANSETRON 4 MILLIGRAM(S): 8 TABLET, FILM COATED ORAL at 18:58

## 2021-01-01 RX ADMIN — Medication 1 APPLICATION(S): at 17:26

## 2021-01-01 RX ADMIN — SODIUM CHLORIDE 1000 MILLILITER(S): 9 INJECTION, SOLUTION INTRAVENOUS at 23:43

## 2021-01-01 RX ADMIN — LATANOPROST 1 DROP(S): 0.05 SOLUTION/ DROPS OPHTHALMIC; TOPICAL at 21:16

## 2021-01-01 RX ADMIN — Medication 325 MILLIGRAM(S): at 05:47

## 2021-01-01 RX ADMIN — Medication 325 MILLIGRAM(S): at 17:21

## 2021-01-01 RX ADMIN — Medication 325 MILLIGRAM(S): at 17:16

## 2021-01-01 RX ADMIN — Medication 1 APPLICATION(S): at 05:09

## 2021-01-01 RX ADMIN — Medication 1000 UNIT(S): at 11:41

## 2021-01-01 RX ADMIN — POLYETHYLENE GLYCOL 3350 17 GRAM(S): 17 POWDER, FOR SOLUTION ORAL at 11:45

## 2021-01-01 RX ADMIN — CEFTRIAXONE 100 MILLIGRAM(S): 500 INJECTION, POWDER, FOR SOLUTION INTRAMUSCULAR; INTRAVENOUS at 05:08

## 2021-01-01 RX ADMIN — Medication 3 MILLILITER(S): at 19:21

## 2021-01-01 RX ADMIN — Medication 30 MILLILITER(S): at 21:03

## 2021-01-01 RX ADMIN — Medication 1 MILLIGRAM(S): at 11:45

## 2021-01-01 RX ADMIN — Medication 300 MILLIGRAM(S): at 11:41

## 2021-01-01 RX ADMIN — PANTOPRAZOLE SODIUM 40 MILLIGRAM(S): 20 TABLET, DELAYED RELEASE ORAL at 05:19

## 2021-01-01 RX ADMIN — HALOPERIDOL DECANOATE 2 MILLIGRAM(S): 100 INJECTION INTRAMUSCULAR at 02:09

## 2021-01-01 RX ADMIN — CEFTRIAXONE 100 MILLIGRAM(S): 500 INJECTION, POWDER, FOR SOLUTION INTRAMUSCULAR; INTRAVENOUS at 04:53

## 2021-01-01 RX ADMIN — Medication 1 MILLIGRAM(S): at 11:57

## 2021-01-01 RX ADMIN — Medication 1 APPLICATION(S): at 05:47

## 2021-01-01 RX ADMIN — POLYETHYLENE GLYCOL 3350 17 GRAM(S): 17 POWDER, FOR SOLUTION ORAL at 11:55

## 2021-01-01 RX ADMIN — Medication 325 MILLIGRAM(S): at 05:20

## 2021-01-01 RX ADMIN — Medication 325 MILLIGRAM(S): at 06:42

## 2021-01-01 RX ADMIN — PANTOPRAZOLE SODIUM 40 MILLIGRAM(S): 20 TABLET, DELAYED RELEASE ORAL at 17:04

## 2021-01-01 RX ADMIN — Medication 650 MILLIGRAM(S): at 20:01

## 2021-01-01 RX ADMIN — PANTOPRAZOLE SODIUM 40 MILLIGRAM(S): 20 TABLET, DELAYED RELEASE ORAL at 05:03

## 2021-01-01 RX ADMIN — SODIUM CHLORIDE 1 GRAM(S): 9 INJECTION INTRAMUSCULAR; INTRAVENOUS; SUBCUTANEOUS at 11:57

## 2021-01-01 RX ADMIN — Medication 1000 UNIT(S): at 11:56

## 2021-01-01 RX ADMIN — SODIUM CHLORIDE 1 GRAM(S): 9 INJECTION INTRAMUSCULAR; INTRAVENOUS; SUBCUTANEOUS at 01:40

## 2021-01-01 RX ADMIN — LATANOPROST 1 DROP(S): 0.05 SOLUTION/ DROPS OPHTHALMIC; TOPICAL at 21:15

## 2021-01-01 RX ADMIN — Medication 650 MILLIGRAM(S): at 18:45

## 2021-01-01 RX ADMIN — Medication 325 MILLIGRAM(S): at 05:03

## 2021-01-01 RX ADMIN — Medication 1000 UNIT(S): at 11:54

## 2021-01-01 RX ADMIN — Medication 325 MILLIGRAM(S): at 05:08

## 2021-01-01 RX ADMIN — Medication 2.5 MILLIGRAM(S): at 05:03

## 2021-01-01 RX ADMIN — PANTOPRAZOLE SODIUM 40 MILLIGRAM(S): 20 TABLET, DELAYED RELEASE ORAL at 17:26

## 2021-01-01 RX ADMIN — CEFTRIAXONE 100 MILLIGRAM(S): 500 INJECTION, POWDER, FOR SOLUTION INTRAMUSCULAR; INTRAVENOUS at 05:20

## 2021-01-01 RX ADMIN — Medication 1 APPLICATION(S): at 05:03

## 2021-01-01 RX ADMIN — ONDANSETRON 4 MILLIGRAM(S): 8 TABLET, FILM COATED ORAL at 06:59

## 2021-01-01 RX ADMIN — Medication 2.5 MILLIGRAM(S): at 05:08

## 2021-01-01 RX ADMIN — Medication 2.5 MILLIGRAM(S): at 05:20

## 2021-01-01 RX ADMIN — Medication 1 APPLICATION(S): at 17:04

## 2021-01-01 RX ADMIN — Medication 1000 UNIT(S): at 11:45

## 2021-01-01 RX ADMIN — Medication 650 MILLIGRAM(S): at 10:01

## 2021-01-01 RX ADMIN — Medication 3 MILLIGRAM(S): at 21:03

## 2021-01-01 RX ADMIN — Medication 325 MILLIGRAM(S): at 17:26

## 2021-01-01 RX ADMIN — PANTOPRAZOLE SODIUM 40 MILLIGRAM(S): 20 TABLET, DELAYED RELEASE ORAL at 05:08

## 2021-01-01 RX ADMIN — POLYETHYLENE GLYCOL 3350 17 GRAM(S): 17 POWDER, FOR SOLUTION ORAL at 14:20

## 2021-01-01 RX ADMIN — PANTOPRAZOLE SODIUM 40 MILLIGRAM(S): 20 TABLET, DELAYED RELEASE ORAL at 17:16

## 2021-01-01 RX ADMIN — Medication 1 APPLICATION(S): at 17:36

## 2021-01-01 RX ADMIN — Medication 325 MILLIGRAM(S): at 17:05

## 2021-01-01 RX ADMIN — LATANOPROST 1 DROP(S): 0.05 SOLUTION/ DROPS OPHTHALMIC; TOPICAL at 21:04

## 2021-01-01 RX ADMIN — Medication 300 MILLIGRAM(S): at 11:15

## 2021-01-01 RX ADMIN — LATANOPROST 1 DROP(S): 0.05 SOLUTION/ DROPS OPHTHALMIC; TOPICAL at 21:43

## 2021-01-01 RX ADMIN — Medication 650 MILLIGRAM(S): at 02:06

## 2021-01-01 RX ADMIN — Medication 1 APPLICATION(S): at 05:20

## 2021-01-01 RX ADMIN — PANTOPRAZOLE SODIUM 40 MILLIGRAM(S): 20 TABLET, DELAYED RELEASE ORAL at 18:39

## 2021-01-01 RX ADMIN — Medication 1 APPLICATION(S): at 17:21

## 2021-01-01 RX ADMIN — Medication 25 MILLIGRAM(S): at 11:15

## 2021-01-01 RX ADMIN — Medication 20 MILLIGRAM(S): at 18:39

## 2021-01-01 RX ADMIN — Medication 2.5 MILLIGRAM(S): at 05:47

## 2021-01-01 RX ADMIN — Medication 300 MILLIGRAM(S): at 11:56

## 2021-01-01 RX ADMIN — Medication 20 MILLIGRAM(S): at 09:44

## 2021-01-01 RX ADMIN — PANTOPRAZOLE SODIUM 40 MILLIGRAM(S): 20 TABLET, DELAYED RELEASE ORAL at 05:47

## 2021-01-01 RX ADMIN — SENNA PLUS 2 TABLET(S): 8.6 TABLET ORAL at 21:03

## 2021-01-01 RX ADMIN — CEFTRIAXONE 100 MILLIGRAM(S): 500 INJECTION, POWDER, FOR SOLUTION INTRAMUSCULAR; INTRAVENOUS at 05:46

## 2021-01-01 RX ADMIN — POLYETHYLENE GLYCOL 3350 17 GRAM(S): 17 POWDER, FOR SOLUTION ORAL at 11:41

## 2021-01-01 RX ADMIN — Medication 1 MILLIGRAM(S): at 11:15

## 2021-01-01 RX ADMIN — Medication 650 MILLIGRAM(S): at 02:30

## 2021-01-01 RX ADMIN — Medication 1 MILLIGRAM(S): at 11:41

## 2021-01-01 RX ADMIN — Medication 300 MILLIGRAM(S): at 11:45

## 2021-01-01 RX ADMIN — Medication 1 SUPPOSITORY(S): at 11:56

## 2021-01-01 RX ADMIN — CEFTRIAXONE 100 MILLIGRAM(S): 500 INJECTION, POWDER, FOR SOLUTION INTRAMUSCULAR; INTRAVENOUS at 05:00

## 2021-01-01 RX ADMIN — Medication 2.5 MILLIGRAM(S): at 06:42

## 2021-01-01 RX ADMIN — LATANOPROST 1 DROP(S): 0.05 SOLUTION/ DROPS OPHTHALMIC; TOPICAL at 21:21

## 2021-01-01 RX ADMIN — Medication 1000 UNIT(S): at 11:15

## 2021-01-01 RX ADMIN — Medication 600 MILLIGRAM(S): at 23:12

## 2021-01-01 RX ADMIN — Medication 1 APPLICATION(S): at 17:16

## 2021-01-01 RX ADMIN — Medication 300 MILLIGRAM(S): at 11:54

## 2021-01-01 RX ADMIN — Medication 400 MILLIGRAM(S): at 05:47

## 2021-01-01 RX ADMIN — IOHEXOL 30 MILLILITER(S): 300 INJECTION, SOLUTION INTRAVENOUS at 14:40

## 2021-01-01 RX ADMIN — Medication 650 MILLIGRAM(S): at 10:56

## 2021-01-01 RX ADMIN — Medication 25 MILLIGRAM(S): at 21:43

## 2021-01-01 RX ADMIN — SODIUM CHLORIDE 1 GRAM(S): 9 INJECTION INTRAMUSCULAR; INTRAVENOUS; SUBCUTANEOUS at 21:42

## 2021-01-01 RX ADMIN — PANTOPRAZOLE SODIUM 40 MILLIGRAM(S): 20 TABLET, DELAYED RELEASE ORAL at 17:21

## 2021-08-28 NOTE — ED PROVIDER NOTE - PHYSICAL EXAMINATION
Physical Exam    Vital Signs: I have reviewed the initial vital signs.  Constitutional: well-nourished, appears stated age, no acute distress  Eyes: Conjunctiva pink, Sclera clear  Cardiovascular: S1 and S2, regular rate, regular rhythm, well-perfused extremities, radial pulses equal and 2+ b/l.   Respiratory: unlabored respiratory effort, clear to auscultation bilaterally no wheezing, rales and rhonchi. pt is speaking full sentences. no accessory muscle use.   Gastrointestinal: soft, non-tender, nondistended abdomen, no pulsatile mass, normal bowl sounds, no rebound, no guarding, no organomegaly.   Rectal: pt has hemorrhoids. no brbpr. no anal fissures. no palpable masses.   Musculoskeletal: supple neck, no lower extremity edema, no calf tenderness, no midline tenderness, no palpable spinal step offs  Integumentary: warm, dry, no rash. (+) ecchymosis to b/l arms, legs and back pt reports is chronic  Neurologic: awake, alert, cranial nerves II-XII grossly intact, extremities’ motor and sensory functions grossly intact.   Psychiatric: appropriate mood, appropriate affect

## 2021-08-28 NOTE — ED PROVIDER NOTE - ATTENDING CONTRIBUTION TO CARE
I personally evaluated the patient. I reviewed the Resident’s or Physician Assistant’s note (as assigned above), and agree with the findings and plan except as documented in my note.  Chart reviewed.  H/O Non-Hodgkin's lymphoma on immunotherapy, Fe deficiency anemia, overactive bladder, presents with fever, anorexia and vomiting.  Exam shows alert patient in no distress, HEENT NCAT, lungs clear, RR S1S2, abdomen soft NT +BS, no rebound or guarding,  rectal +hemorrhoids, no blood.

## 2021-08-28 NOTE — ED PROVIDER NOTE - OBJECTIVE STATEMENT
89 y/o female with a PMH of non-hodgkin's lymphoma on immunetherapy followed by Dr. Hernandez in [place], ITP on prednisone, JASBIR on iron pills, glaucoma, and overactive bladder presents to the ED for evaluation of no appetite, nausea, nonbloody vomiting x 1 week. pt reports she was vaccinated for covid in January 2021, had pfizer. pt reports she had chills two nights ago. pt reports sob. pt denies cough, chest pain, dizziness, abdominal pain, diarrhea, constipation, recent sick contacts, recent travel, recent trauma, recent surgeries, recent hospitalizations, use of hormones, cigarette smoking, burning or pain with urination.

## 2021-08-28 NOTE — ED PROVIDER NOTE - NS ED ROS FT
CONST: No fever, (+) chills. No bodyaches  EYES: No pain, redness, drainage or visual changes.  ENT: No ear pain or discharge, nasal discharge or congestion. No sore throat  CARD: No chest pain, palpitations  RESP: (+) SOB. No cough, hemoptysis. No hx of asthma or COPD  GI: No abdominal pain, (+) N/V. No diarrhea  : No urinary symptoms  MS: No joint pain, back pain or extremity pain/injury  SKIN: No rashes  NEURO: No headache, dizziness, paresthesias or LOC

## 2021-08-28 NOTE — ED PROVIDER NOTE - CLINICAL SUMMARY MEDICAL DECISION MAKING FREE TEXT BOX
Labs noted for wbc 4, hgb 6.6, plt 68k, Na 128, trop 0.03.  UA + WBC's and bacteria.  EKG NSR no acute changes.  CXR negative.  Given IVF and Rocephin.  Will admit. Labs noted for wbc 4, hgb 6.6, plt 68k, Na 128, trop 0.03.  UA + WBC's and bacteria.  EKG NSR no acute changes.  CXR negative.  Given IVF and Rocephin, transfused PRBC.  Will admit.

## 2021-08-29 NOTE — H&P ADULT - NSHPSOCIALHISTORY_GEN_ALL_CORE
Unable to obtain from patient at this time Unable to obtain from patient at this time (but subsequently noted to not smoke or use alcohol)

## 2021-08-29 NOTE — H&P ADULT - ASSESSMENT
Anemia with low MCV- history of NHL and JASBIR - suspect iron deficiency as cause in this case. Receiving 1 unit PRBC. Would monitor, consider discussion with patient's usual hematologist concerning history, prior work up when possible    UTI- continue Rocephin started in ER    Elevated troponin- suspect from infection, monitor on Rocephin     Elevated BNP    Hyponatremia     poor appetite  Anemia with low MCV- history of NHL and JASBIR - suspect iron deficiency as cause in this case. Receiving 1 unit PRBC. Would monitor, consider discussion with patient's usual hematologist concerning history, prior work up when possible    UTI (to me, meets criteria for sepsis (leukopenia, fever, moderately  high heart rate) -  continue Rocephin started in ER    Elevated troponin- suspect from infection, monitor on Rocephin     Elevated BNP    Hyponatremia     poor appetite  Anemia with low MCV- history of NHL and JASBIR - suspect iron deficiency as cause in this case. Receiving 1 unit PRBC. Would monitor, consider discussion with patient's usual hematologist concerning history, prior work up when possible    UTI (to me, meets criteria for sepsis (leukopenia, fever, moderately  high heart rate) -  continue Rocephin started in ER    Elevated troponin- suspect from infection but in view of ane,ia, could be demand ischemia. Repeat with next blood draw    Elevated BNP- monitor clinically, ECHO ordered     Hyponatremia - monitor, may need intervention (fluid restiction?)    poor appetite     Vitamin D and Folic acid  deficiency - on supplements    Uses potassium chloride at home- not ordered pending repeat lab work  Anemia with low MCV- history of NHL and JASBIR - suspect iron deficiency as cause in this case. Receiving 1 unit PRBC. Would monitor, consider discussion with patient's usual hematologist concerning history, prior work up when possible    UTI (to me, meets criteria for sepsis (leukopenia, fever, moderately  high heart rate) -  continue Rocephin started in ER    Elevated troponin- suspect from infection but in view of ane,ia, could be demand ischemia. Repeat with next blood draw    Elevated BNP- monitor clinically, ECHO ordered     Hyponatremia - monitor, may need intervention (fluid restiction?) NS infusion not ordered due to concern with elevated BNP    poor appetite - nutrition consult    Vitamin D and Folic acid  deficiency - on supplements    Uses potassium chloride at home- not ordered pending repeat lab work  Anemia (actually pancytopenia) with low MCV- history of NHL and JASBIR - suspect iron deficiency as cause in this case. Receiving 1 unit PRBC. Would monitor, consider discussion with patient's usual hematologist concerning history, prior work up when possible    UTI (to me, meets criteria for sepsis (leukopenia, fever, moderately  high heart rate) -  continue Rocephin started in ER    Elevated troponin- suspect from infection but in view of anemia, could be demand ischemia. Repeat with next blood draw    Elevated BNP- monitor clinically, ECHO ordered     Hyponatremia - monitor, may need intervention (fluid restriction NS infusion not ordered due to concern with elevated BNP    poor appetite - nutrition consult    Vitamin D and Folic acid  deficiency - on supplements    Uses potassium chloride at home- not ordered pending repeat lab work

## 2021-08-29 NOTE — ED ADULT NURSE NOTE - EXTENSIONS OF SELF_ADULT
all meds but Imbruvica and Mitocore MVI take by jaquelin, those two sent to pharmacy/Eyeglasses/Cane

## 2021-08-29 NOTE — CONSULT NOTE ADULT - SUBJECTIVE AND OBJECTIVE BOX
Patient is a 89y old  Female who presents with a chief complaint of     HPI:  (Patient is very sleepy, gives minimal information. No family at bedside. 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 Fever noted on arrival, improved with motrin given in ER. There were no complaints of abdominal or chest pain, dizziness or shortness of breath. In ER found to have anemia along with evidence for  UTI     Pt seen on 8/29/21, gives H/O NHL low grade previously treated with Rituxan, currently on Imbruvica which was started 1 month ago.  Pt reports worsening bruising and rectal/urinary bleeding. states she thought she has hemorrhoids. Now bleeds even on urination.  Pt is being treated by Dr Hernandez in [place].     She is also on steroids for ITP    ROS:  Negative except for:    PAST MEDICAL & SURGICAL HISTORY:   B cell lymphoma    Overactive bladder    History of ITP    S/P total knee arthroplasty  bilateral        SOCIAL HISTORY:    FAMILY HISTORY:  Family history unknown        MEDICATIONS  (STANDING):  allopurinol 300 milliGRAM(s) Oral daily  betamethasone valerate 0.1% Cream 1 Application(s) Topical two times a day  cholecalciferol 1000 Unit(s) Oral daily  ferrous    sulfate 325 milliGRAM(s) Oral two times a day  folic acid 1 milliGRAM(s) Oral daily  latanoprost 0.005% Ophthalmic Solution 1 Drop(s) Both EYES at bedtime  Mitocore Daily Supplement 1 Capsule(s) 1 Capsule(s) Oral daily  pantoprazole  Injectable 40 milliGRAM(s) IV Push two times a day  polyethylene glycol 3350 17 Gram(s) Oral daily  predniSONE   Tablet 2.5 milliGRAM(s) Oral daily  senna 2 Tablet(s) Oral at bedtime    MEDICATIONS  (PRN):  diphenhydrAMINE 25 milliGRAM(s) Oral every 8 hours PRN Rash and/or Itching      Allergies    iodine (Unknown)  penicillins (Unknown)  Vicodin (Unknown)    Intolerances        Vital Signs Last 24 Hrs  T(C): 36 (29 Aug 2021 05:50), Max: 40.1 (28 Aug 2021 22:41)  T(F): 96.8 (29 Aug 2021 05:50), Max: 104.2 (28 Aug 2021 22:41)  HR: 68 (29 Aug 2021 09:43) (65 - 90)  BP: 123/58 (29 Aug 2021 09:43) (122/56 - 150/62)  BP(mean): --  RR: 18 (29 Aug 2021 05:50) (18 - 18)  SpO2: 98% (29 Aug 2021 09:07) (98% - 98%)    PHYSICAL EXAM  General: adult in NAD  HEENT: mucosal bleeding noted on the ventral aspect of tongue    Neck: supple  CV: normal S1/S2 with no murmur rubs or gallops  Lungs: positive air movement b/l ant lungs,clear to auscultation, no wheezes, no rales  Abdomen: soft non-tender non-distended, no hepatosplenomegaly  Ext: no clubbing cyanosis or edema  Skin: diffuse ecchymoses and blood blisters on arms  Neuro: alert and oriented X 4, no focal deficits      LABS:                          10.1   4.71  )-----------( 75       ( 29 Aug 2021 11:43 )             31.8         Mean Cell Volume : 83.0 fL  Mean Cell Hemoglobin : 26.4 pg  Mean Cell Hemoglobin Concentration : 31.8 g/dL  Auto Neutrophil # : x  Auto Lymphocyte # : x  Auto Monocyte # : x  Auto Eosinophil # : x  Auto Basophil # : x  Auto Neutrophil % : x  Auto Lymphocyte % : x  Auto Monocyte % : x  Auto Eosinophil % : x  Auto Basophil % : x      Serial CBC's  08-29 @ 11:43  Hct-31.8 / Hgb-10.1 / Plat-75 / RBC-3.83 / WBC-4.71  Serial CBC's  08-28 @ 23:10  Hct-21.6 / Hgb-6.6 / Plat-68 / RBC-2.68 / WBC-4.39      08-29    133<L>  |  97<L>  |  23<H>  ----------------------------<  135<H>  4.9   |  24  |  1.0    Ca    8.7      29 Aug 2021 11:43    TPro  5.5<L>  /  Alb  3.4<L>  /  TBili  1.2  /  DBili  x   /  AST  24  /  ALT  19  /  AlkPhos  70  08-29      PT/INR - ( 28 Aug 2021 23:10 )   PT: 15.10 sec;   INR: 1.32 ratio         PTT - ( 28 Aug 2021 23:10 )  PTT:27.2 sec                BLOOD SMEAR INTERPRETATION:       RADIOLOGY & ADDITIONAL STUDIES:< from: Xray Chest 1 View-PORTABLE IMMEDIATE (Xray Chest 1 View-PORTABLE IMMEDIATE .) (08.29.21 @ 09:48) >      < end of copied text >  < from: Xray Chest 1 View-PORTABLE IMMEDIATE (Xray Chest 1 View-PORTABLE IMMEDIATE .) (08.29.21 @ 09:48) >  XR CHEST PORTABLE IMMED 1V            PROCEDURE DATE:  08/29/2021            INTERPRETATION:  Clinical History / Reason for exam: 89-year-old female with shortness of breath and anemia.    Comparison : Chest radiograph performed 9/19/2020.    Technique/Positioning: Upright, AP portable.    Findings:    Support devices: Precordial leads are present.    Cardiac/mediastinum/hilum: The cardiac silhouette is magnified.  There are thoracic aortic calcifications.    Lung parenchyma/Pleura: No focal pulmonary opacity, pleural effusion or pneumothorax is seen.    Skeleton/soft tissues: Grossly stable.    Impression:    No radiographic evidence of acute cardiopulmonary disease.

## 2021-08-29 NOTE — CONSULT NOTE ADULT - ASSESSMENT
In summary this is an elderly woman with H/O low grade NHL currently on treatment with imbruvica admitted with Hgb of 6.6.  Pt is giving a H/o GI bleeding over the last few weeks   Thrombocytopenia d/t ITP.    Bleeding may be related to imbruvica vs local pathology in colon.    RECOMMENDATIONS  Hold imbruvica for now.  Check Ferritin, Fe TIBC  Continue po prednisone  Agree with PRBC transfusion.  GI eval.  Please contact pt's primary oncologist Dr Hernandez.    PLan D/w Hospitalist and nursing team

## 2021-08-29 NOTE — H&P ADULT - MENTAL STATUS
very sleepy, opens eyes briefly on command (fully alert on arrival per RN staff who add patient hasn't selpt in 3 days)

## 2021-08-29 NOTE — H&P ADULT - NSHPLABSRESULTS_GEN_ALL_CORE
6.6    4.39  )-----------( 68       ( 28 Aug 2021 23:10 )             21.6     MCV- 80.6         128<L>  |  95<L>  |  20  ----------------------------<  147<H>  4.7   |  19  |  1.2    Ca    8.5      28 Aug 2021 23:10    TPro  5.3<L>  /  Alb  3.4<L>  /  TBili  0.7  /  DBili  x   /  AST    /  ALT  20  /  AlkPhos  64            Urinalysis Basic - ( 29 Aug 2021 03:10 )    Color: Yellow / Appearance: Clear / S.020 / pH: x  Gluc: x / Ketone: Negative  / Bili: Negative / Urobili: 0.2 mg/dL   Blood: x / Protein: 100 mg/dL / Nitrite: Negative   Leuk Esterase: Large / RBC: 3-5 /HPF / WBC >50 /HPF   Sq Epi: x / Non Sq Epi: Few /HPF / Bacteria: Moderate      PT/INR - ( 28 Aug 2021 23:10 )   PT: 15.10 sec;   INR: 1.32 ratio         PTT - ( 28 Aug 2021 23:10 )  PTT:27.2 sec  Lactate Trend   @ 23:10 Lactate:1.7     CARDIAC MARKERS ( 28 Aug 2021 23:10 )  x     / 0.03 ng/mL / x     / x     / x          CAPILLARY BLOOD GLUCOSE    BNP- 2907

## 2021-08-29 NOTE — CHART NOTE - NSCHARTNOTEFT_GEN_A_CORE
Same day admission note    Patient is 89 yo female with hx of Diffuse large B cell lymphoma, History of ITP, and Overactive bladder presenting with     1.   UTI  -Continue with current antibiotic pending cultures results    2. Anemia  -Unclear Etiology  -Blood per stool as per RN  -S/P 2 unit of RbC transfusion  -repeat cbc pending  -Monitor H/H  -GI consult  -PPI IV   -If it happens again will consider abdm CTA    3.   Hyponatremia  -Obtain urine, and serum osmolarity, and urine sodium  - 1liter fluid restriction  -Monitor Sodium level    4.  HX of Diffuse large B cell lymphoma  -Hold off on the new medications, might be contributing to rash involving upper extremities  -steroid cream    #Progress Note Handoff  Pending (specify):  still acute   Family discussion:  plan of care was discussed with patient  and daughter  in details.  all questions were answered.  seems to understand, and in agreement  Disposition:  unknown Same day admission note    Patient is 89 yo female with hx of Diffuse large B cell lymphoma, History of ITP, and Overactive bladder presenting with     1.   UTI  -Continue with current antibiotic pending cultures results    2. Anemia  -Unclear Etiology  -Blood per stool as per RN  -S/P 2 unit of RbC transfusion  -repeat cbc pending  -Monitor H/H  -GI consult  -PPI IV   -If it happens again will consider abdm CTA    3.   Hyponatremia  -Obtain urine, and serum osmolarity, and urine sodium  - 1liter fluid restriction  -Monitor Sodium level    4.  HX of Diffuse large B cell lymphoma  -Hold off on the new medications, might be contributing to rash involving upper extremities  -steroid cream    5.  SOB  -Seems to be secondary to volume overload  -CXR shows pulmonary vascular congestion  -IV lasix  -TTE  -Monitor I/O     #Progress Note Handoff  Pending (specify):  still acute   Family discussion:  plan of care was discussed with patient  and daughter  in details.  all questions were answered.  seems to understand, and in agreement  Disposition:  unknown

## 2021-08-29 NOTE — ED ADULT NURSE REASSESSMENT NOTE - NS ED NURSE REASSESS COMMENT FT1
patient has multiple bruises all over arms, some on abdomen and some on back. pts medication Imbruvica counted 74 cap and sent to pharmacy, witnessed by myself and Ivelisse Martinez PCA, also Mitocore sent to pharmacy, all other meds taken home by daughter. first unit blood completed, vss, antibiotic hung and Dr Davidson notified as pt has only 1 iv in right ac placed with ultrasound, pt to get second unit on tele floor, to be transported in NAD No

## 2021-08-29 NOTE — H&P ADULT - HISTORY OF PRESENT ILLNESS
(Patient is very sleepy, gives minimal information. No family at bedside. ER records used extensively) 89y (Patient is very sleepy, gives minimal information. No family at bedside. ER records used extensively) 89yo female whose PMH includes  (Patient is very sleepy, gives minimal information. No family at bedside. 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 Fever noted on arrival. There were no complaints of abominal or chest pain, dizziness or shortness of breath. In ER found to have anemia along with evidence for  UTI (Patient is very sleepy, gives minimal information. No family at bedside. 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 Fever noted on arrival, improved with motrin given in ER. There were no complaints of abdominal or chest pain, dizziness or shortness of breath. In ER found to have anemia along with evidence for  UTI

## 2021-08-30 NOTE — CONSULT NOTE ADULT - ASSESSMENT
89yFemale large B cell lymphoma on immunotherapy, JASBIR, ITP presents for weakness. GI consulted for anemia and possible GI bleed. Patient reports possible rectal bleeding on and off for a month as well as hematuria. Patient reports colonoscopy a long time ago and normal.    Problem 1-Anemia possible rectal bleeding for a month, melena?  rectal exam-brown stool in vault  ddx hemorrhoids, r/o malignancy,   Rec  -spoke with patient and family at bedside   family deciding on conservative measures vs EGD/Colonoscopy, if conservative chosen please feed patient diet as tolerated   -Maintain Hemodynamic Stability   -Monitor CBC  -Negative COVID-19 Test within 3 days for intervention   -CMP,Optimize Electrolytes  -Transfuse prn to hgb >8  -Two large bore IV lines  - PPI BID  -Monitor Vital Signs  -Monitor Stool For blood, frequency, consistency, melena  -Active Type and Screen    89yFemale large B cell lymphoma on immunotherapy, JASBIR, ITP presents for weakness. GI consulted for anemia and possible GI bleed. Patient reports possible rectal bleeding on and off for a month as well as hematuria. Patient reports colonoscopy a long time ago and normal.    Problem 1-Anemia possible rectal bleeding for a month, melena?  rectal exam-brown stool in vault  ddx hemorrhoids, r/o malignancy,   Rec  -spoke with patient and family at bedside they will observe for now and see h and h tomorrow and watch for bleeding to make a final decision   -Maintain Hemodynamic Stability   -Monitor CBC  -Negative COVID-19 Test within 3 days for intervention   -CMP,Optimize Electrolytes  -Transfuse prn to hgb >8  -Two large bore IV lines  - PPI BID  -Monitor Vital Signs  -Monitor Stool For blood, frequency, consistency, melena  -Active Type and Screen

## 2021-08-30 NOTE — PROGRESS NOTE ADULT - SUBJECTIVE AND OBJECTIVE BOX
CC  SOB  HPI.  Patient reports fever this am.  SOB at time  +Blood per stool    offers no other complaints              Constitutional: No fever, fatigue or weight loss.  Skin: No rash.  Eyes: No recent vision problems or eye pain.  ENT: No congestion, ear pain, or sore throat.  Endocrine: No thyroid problems.  Cardiovascular: No chest pain or palpation.  Respiratory: No cough,  congestion, or wheezing.  Gastrointestinal: No abdominal pain, nausea, vomiting, or diarrhea.  Genitourinary: No dysuria.  Musculoskeletal: No joint swelling.  Neurologic: No headache.      Vital Signs Last 24 Hrs  T(C): 36.2 (21 @ 09:29), Max: 38.4 (21 @ 05:20)  T(F): 97.2 (21 @ 09:29), Max: 101.2 (21 @ 05:20)  HR: 98 (21 @ 05:20) (96 - 99)  BP: 108/59 (21 @ 05:20) (108/59 - 138/69)  BP(mean): --  RR: 18 (21 @ 05:20) (18 - 18)  SpO2: 96% (21 @ 15:28) (96% - 96%)        PHYSICAL EXAM-  GENERAL: NAD,   HEAD:  Atraumatic, Normocephalic  EYES: EOMI, PERRLA, conjunctiva and sclera clear  NECK: Supple, No JVD, Normal thyroid  NERVOUS SYSTEM:  Alert  moving all extremities  CHEST/LUNG:  Min crackles with good air entry  HEART: Regular rate and rhythm; No murmurs, rubs, or gallops  ABDOMEN: Soft, Nontender, Nondistended; Bowel sounds present  EXTREMITIES:    No clubbing, cyanosis, or edema  SKIN: No rashes or lesions                                  9.2    5.06  )-----------( 77       ( 30 Aug 2021 06:59 )             28.3     08-    129<L>  |  93<L>  |  25<H>  ----------------------------<  110<H>  4.0   |  24  |  1.3    Ca    8.2<L>      30 Aug 2021 06:59    TPro  4.9<L>  /  Alb  3.0<L>  /  TBili  0.8  /  DBili  x   /  AST  21  /  ALT  17  /  AlkPhos  68  08-30    CARDIAC MARKERS ( 29 Aug 2021 11:43 )  x     / 0.02 ng/mL / 156 U/L / x     / 5.3 ng/mL  CARDIAC MARKERS ( 28 Aug 2021 23:10 )  x     / 0.03 ng/mL / x     / x     / x            Urinalysis Basic - ( 29 Aug 2021 03:10 )    Color: Yellow / Appearance: Clear / S.020 / pH: x  Gluc: x / Ketone: Negative  / Bili: Negative / Urobili: 0.2 mg/dL   Blood: x / Protein: 100 mg/dL / Nitrite: Negative   Leuk Esterase: Large / RBC: 3-5 /HPF / WBC >50 /HPF   Sq Epi: x / Non Sq Epi: Few /HPF / Bacteria: Moderate      PT/INR - ( 28 Aug 2021 23:10 )   PT: 15.10 sec;   INR: 1.32 ratio         PTT - ( 28 Aug 2021 23:10 )  PTT:27.2 sec        MEDICATIONS  (STANDING):  allopurinol 300 milliGRAM(s) Oral daily  betamethasone valerate 0.1% Cream 1 Application(s) Topical two times a day  cefTRIAXone   IVPB 1000 milliGRAM(s) IV Intermittent every 24 hours  cholecalciferol 1000 Unit(s) Oral daily  ferrous    sulfate 325 milliGRAM(s) Oral two times a day  folic acid 1 milliGRAM(s) Oral daily  latanoprost 0.005% Ophthalmic Solution 1 Drop(s) Both EYES at bedtime  Mitocore Daily Supplement 1 Capsule(s) 1 Capsule(s) Oral daily  pantoprazole  Injectable 40 milliGRAM(s) IV Push two times a day  polyethylene glycol 3350 17 Gram(s) Oral daily  predniSONE   Tablet 2.5 milliGRAM(s) Oral daily  senna 2 Tablet(s) Oral at bedtime    MEDICATIONS  (PRN):  acetaminophen   Tablet .. 650 milliGRAM(s) Oral every 6 hours PRN Mild Pain (1 - 3)  albuterol/ipratropium for Nebulization 3 milliLiter(s) Nebulizer every 6 hours PRN Shortness of Breath and/or Wheezing  diphenhydrAMINE 25 milliGRAM(s) Oral every 8 hours PRN Rash and/or Itching  ondansetron Injectable 4 milliGRAM(s) IV Push every 6 hours PRN Nausea and/or Vomiting      Imaging Personally Reviewed:     [x ] YES  [ ] NO    Consultant(s) Notes Reviewed:  [x ] YES  [ ] NO    Care Discussed with Consultants/Other Providers [x ] YES  [ ] No medical contraindication for discharge

## 2021-08-30 NOTE — DIETITIAN INITIAL EVALUATION ADULT. - OTHER CALCULATIONS
8543-5336 kcal (25-30 kcal/kg/BW) 76-91 g pro (1.0-1.2g/kg/BW) 1 L fluid restriction as per MD 2/2 hyponatremia and fluid overload

## 2021-08-30 NOTE — DIETITIAN INITIAL EVALUATION ADULT. - OTHER INFO
pt is 89 year old female with hx of NHL on immunotherapy, JASBIR, ITP, glaucoma, overactive bladder, presents with poor po intake with emesis, + chills, + temp. admitted with anemia (GI following to r/o bleed), UTI, hyponatremia and fluid overload s/p lasix.

## 2021-08-30 NOTE — DIETITIAN INITIAL EVALUATION ADULT. - ORAL INTAKE PTA/DIET HISTORY
as per pt poor po intake for several weeks PTA however she forces herself to eat at least half of all her meals. denies any recent weight changes.

## 2021-08-30 NOTE — CONSULT NOTE ADULT - ASSESSMENT
IMPRESSION:  dyspnea multifactorial   mostly related to the anemia   element of CHF now resolved  GI bleeding    SUGGEST:  no further signs CHF currently  keep Hgb >8  GI w/u for anemia  continue O2 as necessary to maintain sats > 90%<94   no further w/u needed.

## 2021-08-30 NOTE — DIETITIAN INITIAL EVALUATION ADULT. - PERTINENT MEDS FT
MEDICATIONS  (STANDING):  allopurinol 300 milliGRAM(s) Oral daily  betamethasone valerate 0.1% Cream 1 Application(s) Topical two times a day  cefTRIAXone   IVPB 1000 milliGRAM(s) IV Intermittent every 24 hours  cholecalciferol 1000 Unit(s) Oral daily  ferrous    sulfate 325 milliGRAM(s) Oral two times a day  folic acid 1 milliGRAM(s) Oral daily  latanoprost 0.005% Ophthalmic Solution 1 Drop(s) Both EYES at bedtime  Mitocore Daily Supplement 1 Capsule(s) 1 Capsule(s) Oral daily  pantoprazole  Injectable 40 milliGRAM(s) IV Push two times a day  polyethylene glycol 3350 17 Gram(s) Oral daily  predniSONE   Tablet 2.5 milliGRAM(s) Oral daily  senna 2 Tablet(s) Oral at bedtime    MEDICATIONS  (PRN):  acetaminophen   Tablet .. 650 milliGRAM(s) Oral every 6 hours PRN Mild Pain (1 - 3)  albuterol/ipratropium for Nebulization 3 milliLiter(s) Nebulizer every 6 hours PRN Shortness of Breath and/or Wheezing  diphenhydrAMINE 25 milliGRAM(s) Oral every 8 hours PRN Rash and/or Itching  ondansetron Injectable 4 milliGRAM(s) IV Push every 6 hours PRN Nausea and/or Vomiting

## 2021-08-30 NOTE — CONSULT NOTE ADULT - SUBJECTIVE AND OBJECTIVE BOX
Chief complaint/Reason for consult: anemia, r/o GI bleed    HPI:  (Patient is very sleepy, gives minimal information. No family at bedside. 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 Fever noted on arrival, improved with motrin given in ER. There were no complaints of abdominal or chest pain, dizziness or shortness of breath. In ER found to have anemia along with evidence for  UTI (29 Aug 2021 04:15)    GI Updates: 89yFemale large B cell lymphoma on immunotherapy, JASBIR, ITP presents for weakness. GI consulted for anemia and possible GI bleed. Patient reports possible rectal bleeding on and off for a month as well as hematuria. Patient denies nausea, vomiting, hematemesis, melena, blood in stool, diarrhea, constipation, abdominal pain.      PAST MEDICAL & SURGICAL HISTORY:  Diffuse large B cell lymphoma    Overactive bladder    History of ITP    S/P total knee arthroplasty  bilateral          Family history:  FAMILY HISTORY:  Family history unknown      No GI cancers in first or second degree relatives    Social History: No smoking. No alcohol. No illegal drug use.    Allergies:   iodine (Unknown)  penicillins (Unknown)  Vicodin (Unknown)    MEDICATIONS: Home Medications:  allopurinol 300 mg oral tablet: 1 tab(s) orally once a day (29 Aug 2021 04:31)  folic acid 1 mg oral tablet: 1 tab(s) orally once a day (29 Aug 2021 04:31)  Imbruvica 140 mg oral capsule: 2 cap(s) orally once a day (at bedtime) (29 Aug 2021 04:31)  latanoprost 0.005% ophthalmic solution: 1 drop(s) to each affected eye once a day (in the evening) (29 Aug 2021 04:31)  multivitamin: 1 tab(s) orally once a day (29 Aug 2021 04:31)  Poly Iron (as elemental iron) 150 mg oral capsule: 1 cap(s) orally 2 times a day (29 Aug 2021 04:31)  potassium chloride 10 mEq oral capsule, extended release: 1 tab(s) orally once a day (29 Aug 2021 04:31)  predniSONE 2.5 mg oral tablet: 1 tab(s) orally once a day (29 Aug 2021 04:31)  Vitamin D3 1000 intl units (25 mcg) oral tablet: 1000 unit(s) orally once a day (29 Aug 2021 04:31)      MEDICATIONS  (STANDING):  allopurinol 300 milliGRAM(s) Oral daily  betamethasone valerate 0.1% Cream 1 Application(s) Topical two times a day  cefTRIAXone   IVPB 1000 milliGRAM(s) IV Intermittent every 24 hours  cholecalciferol 1000 Unit(s) Oral daily  ferrous    sulfate 325 milliGRAM(s) Oral two times a day  folic acid 1 milliGRAM(s) Oral daily  latanoprost 0.005% Ophthalmic Solution 1 Drop(s) Both EYES at bedtime  Mitocore Daily Supplement 1 Capsule(s) 1 Capsule(s) Oral daily  pantoprazole  Injectable 40 milliGRAM(s) IV Push two times a day  polyethylene glycol 3350 17 Gram(s) Oral daily  predniSONE   Tablet 2.5 milliGRAM(s) Oral daily  senna 2 Tablet(s) Oral at bedtime    MEDICATIONS  (PRN):  acetaminophen   Tablet .. 650 milliGRAM(s) Oral every 6 hours PRN Mild Pain (1 - 3)  albuterol/ipratropium for Nebulization 3 milliLiter(s) Nebulizer every 6 hours PRN Shortness of Breath and/or Wheezing  diphenhydrAMINE 25 milliGRAM(s) Oral every 8 hours PRN Rash and/or Itching  ondansetron Injectable 4 milliGRAM(s) IV Push every 6 hours PRN Nausea and/or Vomiting        REVIEW OF SYSTEMS  General:  No weight loss, fevers, or chills.  Eyes:  No reported pain or visual changes  ENT:  No sore throat or runny nose.  NECK: No stiffness or lymphadenopathy  CV:  No chest pain or palpitations.  Resp:  No shortness of breath, cough, wheezing or hemoptysis  GI:  No abdominal pain, nausea, vomiting, dysphagia, diarrhea or constipation. No rectal bleeding, melena, or hematemesis.  Muscle:  No aches or weakness  Neuro:  No tingling, numbness       VITALS:   T(F): 97.2 (08-30-21 @ 09:29), Max: 101.2 (08-30-21 @ 05:20)  HR: 98 (08-30-21 @ 05:20) (96 - 99)  BP: 108/59 (08-30-21 @ 05:20) (108/59 - 138/69)  RR: 18 (08-30-21 @ 05:20) (18 - 18)  SpO2: 96% (08-29-21 @ 15:28) (96% - 96%)    PHYSICAL EXAM:  GENERAL: AAOx3, no acute distress.  HEAD:  Atraumatic, Normocephalic  EYES: conjunctiva and sclera clear  NECK: Supple, No thyromegaly   CHEST/LUNG: Clear to auscultation bilaterally; No wheeze, rhonchi, or rales  HEART: Regular rate and rhythm; normal S1, S2, No murmurs.  ABDOMEN: Soft, nontender, nondistended; Bowel sounds present  NEUROLOGY: No asterixis or tremor  SKIN: Intact, no jaundice  rectal exam-dark brown stool in rectal vault        LABS:  08-30    129<L>  |  93<L>  |  25<H>  ----------------------------<  110<H>  4.0   |  24  |  1.3    Ca    8.2<L>      30 Aug 2021 06:59    TPro  4.9<L>  /  Alb  3.0<L>  /  TBili  0.8  /  DBili  x   /  AST  21  /  ALT  17  /  AlkPhos  68  08-30                          9.2    5.06  )-----------( 77       ( 30 Aug 2021 06:59 )             28.3     LIVER FUNCTIONS - ( 30 Aug 2021 06:59 )  Alb: 3.0 g/dL / Pro: 4.9 g/dL / ALK PHOS: 68 U/L / ALT: 17 U/L / AST: 21 U/L / GGT: x           PT/INR - ( 28 Aug 2021 23:10 )   PT: 15.10 sec;   INR: 1.32 ratio         PTT - ( 28 Aug 2021 23:10 )  PTT:27.2 sec    IMAGING:  < from: Xray Chest 1 View-PORTABLE IMMEDIATE (Xray Chest 1 View-PORTABLE IMMEDIATE .) (08.29.21 @ 09:48) >    EXAM:  XR CHEST PORTABLE IMMED 1V            PROCEDURE DATE:  08/29/2021            INTERPRETATION:  Clinical History / Reason for exam: 89-year-old female with shortness of breath and anemia.    Comparison : Chest radiograph performed 9/19/2020.    Technique/Positioning: Upright, AP portable.    Findings:    Support devices: Precordial leads are present.    Cardiac/mediastinum/hilum: The cardiac silhouette is magnified.  There are thoracic aortic calcifications.    Lung parenchyma/Pleura: No focal pulmonary opacity, pleural effusion or pneumothorax is seen.    Skeleton/soft tissues: Grossly stable.    Impression:    No radiographic evidence of acute cardiopulmonary disease.        --- End of Report ---              ANABEL MOCTEZUMA MD; Attending Radiologist  This document has been electronically signed. Aug 29 2021 11:58AM    < end of copied text >         Chief complaint/Reason for consult: anemia, r/o GI bleed    HPI:  (Patient is very sleepy, gives minimal information. No family at bedside. 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 Fever noted on arrival, improved with motrin given in ER. There were no complaints of abdominal or chest pain, dizziness or shortness of breath. In ER found to have anemia along with evidence for  UTI (29 Aug 2021 04:15)    GI Updates: 89yFemale large B cell lymphoma on immunotherapy, JASBIR, ITP presents for weakness. GI consulted for anemia and possible GI bleed. Patient reports possible rectal bleeding on and off for a month as well as hematuria. Patient denies nausea, vomiting, hematemesis, melena, blood in stool, diarrhea, constipation, abdominal pain.      PAST MEDICAL & SURGICAL HISTORY:  Diffuse large B cell lymphoma    Overactive bladder    History of ITP    S/P total knee arthroplasty  bilateral          Family history:  FAMILY HISTORY:  Family history unknown      No GI cancers in first or second degree relatives    Social History: No smoking. No alcohol. No illegal drug use.    Allergies:   iodine (Unknown)  penicillins (Unknown)  Vicodin (Unknown)    MEDICATIONS: Home Medications:  allopurinol 300 mg oral tablet: 1 tab(s) orally once a day (29 Aug 2021 04:31)  folic acid 1 mg oral tablet: 1 tab(s) orally once a day (29 Aug 2021 04:31)  Imbruvica 140 mg oral capsule: 2 cap(s) orally once a day (at bedtime) (29 Aug 2021 04:31)  latanoprost 0.005% ophthalmic solution: 1 drop(s) to each affected eye once a day (in the evening) (29 Aug 2021 04:31)  multivitamin: 1 tab(s) orally once a day (29 Aug 2021 04:31)  Poly Iron (as elemental iron) 150 mg oral capsule: 1 cap(s) orally 2 times a day (29 Aug 2021 04:31)  potassium chloride 10 mEq oral capsule, extended release: 1 tab(s) orally once a day (29 Aug 2021 04:31)  predniSONE 2.5 mg oral tablet: 1 tab(s) orally once a day (29 Aug 2021 04:31)  Vitamin D3 1000 intl units (25 mcg) oral tablet: 1000 unit(s) orally once a day (29 Aug 2021 04:31)      MEDICATIONS  (STANDING):  allopurinol 300 milliGRAM(s) Oral daily  betamethasone valerate 0.1% Cream 1 Application(s) Topical two times a day  cefTRIAXone   IVPB 1000 milliGRAM(s) IV Intermittent every 24 hours  cholecalciferol 1000 Unit(s) Oral daily  ferrous    sulfate 325 milliGRAM(s) Oral two times a day  folic acid 1 milliGRAM(s) Oral daily  latanoprost 0.005% Ophthalmic Solution 1 Drop(s) Both EYES at bedtime  Mitocore Daily Supplement 1 Capsule(s) 1 Capsule(s) Oral daily  pantoprazole  Injectable 40 milliGRAM(s) IV Push two times a day  polyethylene glycol 3350 17 Gram(s) Oral daily  predniSONE   Tablet 2.5 milliGRAM(s) Oral daily  senna 2 Tablet(s) Oral at bedtime    MEDICATIONS  (PRN):  acetaminophen   Tablet .. 650 milliGRAM(s) Oral every 6 hours PRN Mild Pain (1 - 3)  albuterol/ipratropium for Nebulization 3 milliLiter(s) Nebulizer every 6 hours PRN Shortness of Breath and/or Wheezing  diphenhydrAMINE 25 milliGRAM(s) Oral every 8 hours PRN Rash and/or Itching  ondansetron Injectable 4 milliGRAM(s) IV Push every 6 hours PRN Nausea and/or Vomiting        REVIEW OF SYSTEMS  General:  No weight loss, fevers, or chills.  Eyes:  No reported pain or visual changes  ENT:  No sore throat or runny nose.  NECK: No stiffness or lymphadenopathy  CV:  No chest pain or palpitations.  Resp:  No shortness of breath, cough, wheezing or hemoptysis  GI:  No abdominal pain, nausea, vomiting, dysphagia, diarrhea or constipation. No rectal bleeding, melena, or hematemesis.  Muscle:  No aches or weakness  Neuro:  No tingling, numbness       VITALS:   T(F): 97.2 (08-30-21 @ 09:29), Max: 101.2 (08-30-21 @ 05:20)  HR: 98 (08-30-21 @ 05:20) (96 - 99)  BP: 108/59 (08-30-21 @ 05:20) (108/59 - 138/69)  RR: 18 (08-30-21 @ 05:20) (18 - 18)  SpO2: 96% (08-29-21 @ 15:28) (96% - 96%)    PHYSICAL EXAM:  GENERAL: AAOx3, no acute distress.  HEAD:  Atraumatic, Normocephalic  EYES: conjunctiva and sclera clear  NECK: Supple, No thyromegaly   CHEST/LUNG: Clear to auscultation bilaterally; No wheeze, rhonchi, or rales  HEART: Regular rate and rhythm; normal S1, S2, No murmurs.  ABDOMEN: Soft, nontender, nondistended; Bowel sounds present  NEUROLOGY: No asterixis or tremor  SKIN: Intact, no jaundice  rectal exam-brown stool in rectal vault        LABS:  08-30    129<L>  |  93<L>  |  25<H>  ----------------------------<  110<H>  4.0   |  24  |  1.3    Ca    8.2<L>      30 Aug 2021 06:59    TPro  4.9<L>  /  Alb  3.0<L>  /  TBili  0.8  /  DBili  x   /  AST  21  /  ALT  17  /  AlkPhos  68  08-30                          9.2    5.06  )-----------( 77       ( 30 Aug 2021 06:59 )             28.3     LIVER FUNCTIONS - ( 30 Aug 2021 06:59 )  Alb: 3.0 g/dL / Pro: 4.9 g/dL / ALK PHOS: 68 U/L / ALT: 17 U/L / AST: 21 U/L / GGT: x           PT/INR - ( 28 Aug 2021 23:10 )   PT: 15.10 sec;   INR: 1.32 ratio         PTT - ( 28 Aug 2021 23:10 )  PTT:27.2 sec    IMAGING:  < from: Xray Chest 1 View-PORTABLE IMMEDIATE (Xray Chest 1 View-PORTABLE IMMEDIATE .) (08.29.21 @ 09:48) >    EXAM:  XR CHEST PORTABLE IMMED 1V            PROCEDURE DATE:  08/29/2021            INTERPRETATION:  Clinical History / Reason for exam: 89-year-old female with shortness of breath and anemia.    Comparison : Chest radiograph performed 9/19/2020.    Technique/Positioning: Upright, AP portable.    Findings:    Support devices: Precordial leads are present.    Cardiac/mediastinum/hilum: The cardiac silhouette is magnified.  There are thoracic aortic calcifications.    Lung parenchyma/Pleura: No focal pulmonary opacity, pleural effusion or pneumothorax is seen.    Skeleton/soft tissues: Grossly stable.    Impression:    No radiographic evidence of acute cardiopulmonary disease.        --- End of Report ---              ANABEL MOCTEZUMA MD; Attending Radiologist  This document has been electronically signed. Aug 29 2021 11:58AM    < end of copied text >

## 2021-08-30 NOTE — PROGRESS NOTE ADULT - ASSESSMENT
Patient is 89 yo female with hx of Diffuse large B cell lymphoma, History of ITP, and Overactive bladder presenting with     1.   UTI  -Continue with current antibiotic pending cultures results    2. Anemia  -Unclear Etiology  -Blood per stool as per RN  -S/P 2 unit of RbC transfusion  -repeat cbc improved   -Monitor H/H  -GI consult  -PPI IV   -If bleeding reoccured, will consider abdm CTA    3.   Hyponatremia  -Obtain urine, and serum osmolarity, and urine sodium  - 1liter fluid restriction  -Monitor Sodium level  -Improving     4.  HX of Diffuse large B cell lymphoma  -Hold off on the new medications, might be contributing to rash involving upper extremities  -steroid cream    5.  SOB  -Seems to be secondary to volume overload  -CXR shows pulmonary vascular congestion  -IV lasix  -TTE  -Monitor I/O   -Pulmonary consult     #Progress Note Handoff  Pending (specify):  still acute   Family discussion:  plan of care was discussed with patient  and daughter  in details.  all questions were answered.  seems to understand, and in agreement  Disposition:  unknown.

## 2021-08-30 NOTE — CONSULT NOTE ADULT - SUBJECTIVE AND OBJECTIVE BOX
HPI:  (Patient is very sleepy, gives minimal information. No family at bedside. 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 Fever noted on arrival, improved with motrin given in ER. There were no complaints of abdominal or chest pain, dizziness or shortness of breath. In ER found to have anemia along with evidence for  UTI (29 Aug 2021 04:15)      I discussed the case  with the referring physician.     I reviewed the radiology tests and hospital record including the ED chart.    I reviewed the other consultants comments that are available in the chart.    CC/ HPI Patient is a 89y old  Female who presents with a chief complaint of SOB (30 Aug 2021 11:24)      ACUTE UTI;LOW HEMOGLOBIN    ^ACUTE UTI;LOW HEMOGLOBIN    Family history unknown    Handoff    MEWS Score    Diffuse large B cell lymphoma    Overactive bladder    History of ITP    Acute UTI    S/P total knee arthroplasty    VOMITING WEAKNESS X2 DAYS    90+    Low hemoglobin    SysAdmin_VisitLink        FAMILY HISTORY:  Family history unknown        iodine (Unknown)  penicillins (Unknown)  Vicodin (Unknown)        Marital Status:  (   )    (   ) Single    (   )    (  )   Occupation:   Lives with: (  ) alone  (  ) children   (  ) spouse   (  ) parents  (  ) other  Recent Travel:     Substance Use (street drugs): (  ) never used  (  ) other:  Tobacco Usage:  (   ) never smoked   (   ) former smoker   (   ) current smoker  (     ) pack year  Alcohol Usage:        Home prescriptions  allopurinol 300 mg oral tablet: 1 tab(s) orally once a day  folic acid 1 mg oral tablet: 1 tab(s) orally once a day  Imbruvica 140 mg oral capsule: 2 cap(s) orally once a day (at bedtime)  latanoprost 0.005% ophthalmic solution: 1 drop(s) to each affected eye once a day (in the evening)  multivitamin: 1 tab(s) orally once a day  Poly Iron (as elemental iron) 150 mg oral capsule: 1 cap(s) orally 2 times a day  potassium chloride 10 mEq oral capsule, extended release: 1 tab(s) orally once a day  predniSONE 2.5 mg oral tablet: 1 tab(s) orally once a day  Vitamin D3 1000 intl units (25 mcg) oral tablet: 1000 unit(s) orally once a day      MEDICATIONS  (STANDING):  allopurinol 300 milliGRAM(s) Oral daily  betamethasone valerate 0.1% Cream 1 Application(s) Topical two times a day  cefTRIAXone   IVPB 1000 milliGRAM(s) IV Intermittent every 24 hours  cholecalciferol 1000 Unit(s) Oral daily  ferrous    sulfate 325 milliGRAM(s) Oral two times a day  folic acid 1 milliGRAM(s) Oral daily  latanoprost 0.005% Ophthalmic Solution 1 Drop(s) Both EYES at bedtime  Mitocore Daily Supplement 1 Capsule(s) 1 Capsule(s) Oral daily  pantoprazole  Injectable 40 milliGRAM(s) IV Push two times a day  polyethylene glycol 3350 17 Gram(s) Oral daily  predniSONE   Tablet 2.5 milliGRAM(s) Oral daily  senna 2 Tablet(s) Oral at bedtime    MEDICATIONS  (PRN):  acetaminophen   Tablet .. 650 milliGRAM(s) Oral every 6 hours PRN Mild Pain (1 - 3)  albuterol/ipratropium for Nebulization 3 milliLiter(s) Nebulizer every 6 hours PRN Shortness of Breath and/or Wheezing  diphenhydrAMINE 25 milliGRAM(s) Oral every 8 hours PRN Rash and/or Itching  ondansetron Injectable 4 milliGRAM(s) IV Push every 6 hours PRN Nausea and/or Vomiting      sodium chloride 0.9%.: Solution, 1000 milliLiter(s) infuse at 50 mL/Hr  Provider's Contact #: [phone]  lactated ringers Bolus:   1000 milliLiter(s), IV Bolus, once, infuse over 60 Minute(s), Stop After 1 Doses  Provider's Contact #: [phone]      T(C): 36.2 (21 @ 09:29), Max: 38.4 (21 @ 05:20)  HR: 98 (21 @ 05:20) (96 - 99)  BP: 108/59 (21 @ 05:20) (108/59 - 138/69)  RR: 18 (21 @ 05:20) (18 - 18)  SpO2: 96% (21 @ 15:28) (96% - 96%)  ICU Vital Signs Last 24 Hrs  T(C): 36.2 (30 Aug 2021 09:29), Max: 38.4 (30 Aug 2021 05:20)  T(F): 97.2 (30 Aug 2021 09:29), Max: 101.2 (30 Aug 2021 05:20)  HR: 98 (30 Aug 2021 05:20) (96 - 99)  BP: 108/59 (30 Aug 2021 05:20) (108/59 - 138/69)  BP(mean): --  ABP: --  ABP(mean): --  RR: 18 (30 Aug 2021 05:20) (18 - 18)  SpO2: 96% (29 Aug 2021 15:28) (96% - 96%)      I&O's Summary    29 Aug 2021 07:01  -  30 Aug 2021 07:00  --------------------------------------------------------  IN: 0 mL / OUT: 700 mL / NET: -700 mL        I&O's Detail    29 Aug 2021 07:01  -  30 Aug 2021 07:00  --------------------------------------------------------  IN:  Total IN: 0 mL    OUT:    Voided (mL): 700 mL  Total OUT: 700 mL    Total NET: -700 mL          Drug Dosing Weight  Height (cm): 162.6 (29 Aug 2021 05:50)  Weight (kg): 75.9 (29 Aug 2021 05:50)  BMI (kg/m2): 28.7 (29 Aug 2021 05:50)  BSA (m2): 1.81 (29 Aug 2021 05:50)    LABS:                          9.2    5.06  )-----------( 77       ( 30 Aug 2021 06:59 )             28.3       08-30    129<L>  |  93<L>  |  25<H>  ----------------------------<  110<H>  4.0   |  24  |  1.3    Ca    8.2<L>      30 Aug 2021 06:59    TPro  4.9<L>  /  Alb  3.0<L>  /  TBili  0.8  /  DBili  x   /  AST  21  /  ALT  17  /  AlkPhos  68  08-30      LIVER FUNCTIONS - ( 30 Aug 2021 06:59 )  Alb: 3.0 g/dL / Pro: 4.9 g/dL / ALK PHOS: 68 U/L / ALT: 17 U/L / AST: 21 U/L / GGT: x             CARDIAC MARKERS ( 29 Aug 2021 11:43 )  x     / 0.02 ng/mL / 156 U/L / x     / 5.3 ng/mL  CARDIAC MARKERS ( 28 Aug 2021 23:10 )  x     / 0.03 ng/mL / x     / x     / x                Serum Pro-Brain Natriuretic Peptide: 2907 pg/mL (21 @ 23:10)              COVID-19 PCR: NotDetec (28 Aug 2021 23:10)      Urinalysis Basic - ( 29 Aug 2021 03:10 )    Color: Yellow / Appearance: Clear / S.020 / pH: x  Gluc: x / Ketone: Negative  / Bili: Negative / Urobili: 0.2 mg/dL   Blood: x / Protein: 100 mg/dL / Nitrite: Negative   Leuk Esterase: Large / RBC: 3-5 /HPF / WBC >50 /HPF   Sq Epi: x / Non Sq Epi: Few /HPF / Bacteria: Moderate        cefTRIAXone   IVPB 1000 milliGRAM(s) IV Intermittent every 24 hours                  RADIOLOGY:  I have personally reviewed all chest and other pertinent radiology films.      REVIEW OF SYSTEMS:     · CONSTITUTIONAL:   no fever   no chills.      · EYES:   no discharge,   no irritation,    no visual changes.    · ENMT:   Ears: no ear pain and no hearing problems.  Nose: no nasal congestion and no nasal drainage.      · CARDIOVASCULAR:   no chest pain,   no swelling  no palpitations      · RESPIRATORY:  no SOB,  no wheezing ,  no respiratory difficulty  no sputum production    · GASTROINTESTINAL:   no abdominal pain,    no nausea   no vomiting.    · GENITOURINARY:  no dysuria,   no frequency,       · MUSCULOSKELETAL:   no back pain,   no neck pain,   no weakness.    · SKIN:   no pruritis,   no rashes.    · NEURO:   no loss of consciousness,   no headache,   no weakness.    · PSYCHIATRIC:   no known mental health issues  no anxiety  no depression        ALLERGIC/IMMUNOLOGIC:   No active allergic or immunologic issues    all other systems are negative      PHYSICAL EXAM:     · CONSTITUTIONAL:   not septic appearing,   well nourished,   NAD    · ENMT:   Airway patent,   Nasal mucosa clear.  Mouth with normal mucosa.   No thrush    · EYES:   Clear bilaterally,   pupils equal,   round and reactive to light.    · CARDIAC:   Normal rate,   regular rhythm.    Heart sounds S1, S2.   no thrills or bruits on palpitation  normal  cardiac impulse  No murmurs, no rubs or gallops on auscultation  no edema        CAROTID:   normal systolic impulse  no bruits    · RESPIRATORY:   no w/r/r/,   normal chest expansion  not tachypneic,  no retractions or use of accessory muscles  palpation of chest is normal with no fremitus  percussion of chest demonstrates no hyperresonance or dullness    · GASTROINTESTINAL:  Abdomen soft,   non-tender,   no guarding,   + BS  liver spleen not palpable      · MUSCULOSKELETAL:   range of motion is not limited,  no clubbing, cyanosis  no petechiae    · NEUROLOGICAL:   Alert and oriented   no obvious focal deficits in cranial nerve areas  no motor or sensory deficits.      · SKIN:   Skin normal color for race,   warm,   dry and intact.       · PSYCHIATRIC:   Alert and oriented to person,   place, time/situation.   normal mood and affect.   no apparent risk to self or others.    · HEME LYMPH:   no splenomegaly.  No cervical  lymphadenopathy.  no inguinal lymphadenopathy           HPI:  (Patient is very sleepy, gives minimal information. No family at bedside. 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 Fever noted on arrival, improved with motrin given in ER. There were no complaints of abdominal or chest pain, dizziness or shortness of breath. In ER found to have anemia along with evidence for  UTI (29 Aug 2021 04:15)      I discussed the case  with the referring physician.     I reviewed the radiology tests and hospital record including the ED chart.    I reviewed the other consultants comments that are available in the chart.    The patient was very talkative today. She could speak on long sentences without stopping. Her biggest issues was fatigue and generalized weakness.    CC/ HPI Patient is a 89y old  Female who presents with a chief complaint of SOB (30 Aug 2021 11:24)      ACUTE UTI;LOW HEMOGLOBIN      Family history unknown    Diffuse large B cell lymphoma    Overactive bladder    History of ITP    Acute UTI    S/P total knee arthroplasty        FAMILY HISTORY:  Family history unknown        iodine (Unknown)  penicillins (Unknown)  Vicodin (Unknown)        Marital Status:  (   )    (   ) Single    (   )    ( x )   Occupation:   Lives with: (  ) alone  (  ) children   (  ) spouse   (  ) parents  ( x ) other  Recent Travel:     Substance Use (street drugs): (  x) never used  (  ) other:  Tobacco Usage:  (   ) never smoked   ( x  ) former smoker   (   ) current smoker  (     ) pack year          Home prescriptions  allopurinol 300 mg oral tablet: 1 tab(s) orally once a day  folic acid 1 mg oral tablet: 1 tab(s) orally once a day  Imbruvica 140 mg oral capsule: 2 cap(s) orally once a day (at bedtime)  latanoprost 0.005% ophthalmic solution: 1 drop(s) to each affected eye once a day (in the evening)  multivitamin: 1 tab(s) orally once a day  Poly Iron (as elemental iron) 150 mg oral capsule: 1 cap(s) orally 2 times a day  potassium chloride 10 mEq oral capsule, extended release: 1 tab(s) orally once a day  predniSONE 2.5 mg oral tablet: 1 tab(s) orally once a day  Vitamin D3 1000 intl units (25 mcg) oral tablet: 1000 unit(s) orally once a day      MEDICATIONS  (STANDING):  allopurinol 300 milliGRAM(s) Oral daily  betamethasone valerate 0.1% Cream 1 Application(s) Topical two times a day  cefTRIAXone   IVPB 1000 milliGRAM(s) IV Intermittent every 24 hours  cholecalciferol 1000 Unit(s) Oral daily  ferrous    sulfate 325 milliGRAM(s) Oral two times a day  folic acid 1 milliGRAM(s) Oral daily  latanoprost 0.005% Ophthalmic Solution 1 Drop(s) Both EYES at bedtime  Mitocore Daily Supplement 1 Capsule(s) 1 Capsule(s) Oral daily  pantoprazole  Injectable 40 milliGRAM(s) IV Push two times a day  polyethylene glycol 3350 17 Gram(s) Oral daily  predniSONE   Tablet 2.5 milliGRAM(s) Oral daily  senna 2 Tablet(s) Oral at bedtime    MEDICATIONS  (PRN):  acetaminophen   Tablet .. 650 milliGRAM(s) Oral every 6 hours PRN Mild Pain (1 - 3)  albuterol/ipratropium for Nebulization 3 milliLiter(s) Nebulizer every 6 hours PRN Shortness of Breath and/or Wheezing  diphenhydrAMINE 25 milliGRAM(s) Oral every 8 hours PRN Rash and/or Itching  ondansetron Injectable 4 milliGRAM(s) IV Push every 6 hours PRN Nausea and/or Vomiting      sodium chloride 0.9%.: Solution, 1000 milliLiter(s) infuse at 50 mL/Hr  Provider's Contact #: [phone]  lactated ringers Bolus:   1000 milliLiter(s), IV Bolus, once, infuse over 60 Minute(s), Stop After 1 Doses  Provider's Contact #: [phone]      T(C): 36.2 (21 @ 09:29), Max: 38.4 (21 @ 05:20)  HR: 98 (21 @ 05:20) (96 - 99)  BP: 108/59 (21 @ 05:20) (108/59 - 138/69)  RR: 18 (21 @ 05:20) (18 - 18)  SpO2: 96% (21 @ 15:28) (96% - 96%)  ICU Vital Signs Last 24 Hrs  T(C): 36.2 (30 Aug 2021 09:29), Max: 38.4 (30 Aug 2021 05:20)  T(F): 97.2 (30 Aug 2021 09:29), Max: 101.2 (30 Aug 2021 05:20)  HR: 98 (30 Aug 2021 05:20) (96 - 99)  BP: 108/59 (30 Aug 2021 05:20) (108/59 - 138/69)    RR: 18 (30 Aug 2021 05:20) (18 - 18)  SpO2: 96% (29 Aug 2021 15:28) (96% - 96%)      I&O's Summary    29 Aug 2021 07:01  -  30 Aug 2021 07:00  --------------------------------------------------------  IN: 0 mL / OUT: 700 mL / NET: -700 mL        I&O's Detail    29 Aug 2021 07:01  -  30 Aug 2021 07:00  --------------------------------------------------------  IN:  Total IN: 0 mL    OUT:    Voided (mL): 700 mL  Total OUT: 700 mL    Total NET: -700 mL          Drug Dosing Weight  Height (cm): 162.6 (29 Aug 2021 05:50)  Weight (kg): 75.9 (29 Aug 2021 05:50)  BMI (kg/m2): 28.7 (29 Aug 2021 05:50)  BSA (m2): 1.81 (29 Aug 2021 05:50)    LABS:                          9.2    5.06  )-----------( 77       ( 30 Aug 2021 06:59 )             28.3       08-30    129<L>  |  93<L>  |  25<H>  ----------------------------<  110<H>  4.0   |  24  |  1.3    Ca    8.2<L>      30 Aug 2021 06:59    TPro  4.9<L>  /  Alb  3.0<L>  /  TBili  0.8  /  DBili  x   /  AST  21  /  ALT  17  /  AlkPhos  68  08-30      LIVER FUNCTIONS - ( 30 Aug 2021 06:59 )  Alb: 3.0 g/dL / Pro: 4.9 g/dL / ALK PHOS: 68 U/L / ALT: 17 U/L / AST: 21 U/L / GGT: x             CARDIAC MARKERS ( 29 Aug 2021 11:43 )  x     / 0.02 ng/mL / 156 U/L / x     / 5.3 ng/mL  CARDIAC MARKERS ( 28 Aug 2021 23:10 )  x     / 0.03 ng/mL / x     / x     / x                Serum Pro-Brain Natriuretic Peptide: 2907 pg/mL (21 @ 23:10)              COVID-19 PCR: Not Detec (28 Aug 2021 23:10)      Urinalysis Basic - ( 29 Aug 2021 03:10 )    Color: Yellow / Appearance: Clear / S.020 / pH: x  Gluc: x / Ketone: Negative  / Bili: Negative / Urobili: 0.2 mg/dL   Blood: x / Protein: 100 mg/dL / Nitrite: Negative   Leuk Esterase: Large / RBC: 3-5 /HPF / WBC >50 /HPF   Sq Epi: x / Non Sq Epi: Few /HPF / Bacteria: Moderate        cefTRIAXone   IVPB 1000 milliGRAM(s) IV Intermittent every 24 hours    RADIOLOGY:  I have personally reviewed all chest and other pertinent radiology films.      REVIEW OF SYSTEMS:     · CONSTITUTIONAL:   no fever   no chills.      · EYES:   no discharge,   no irritation,    no visual changes.    · ENMT:   Ears: no ear pain and no hearing problems.  Nose: no nasal congestion and no nasal drainage.      · CARDIOVASCULAR:   no chest pain,   no swelling  no palpitations      · RESPIRATORY:  +SOB,  no wheezing ,  +respiratory difficulty  no sputum production    · GASTROINTESTINAL:   no abdominal pain,    no nausea   no vomiting.    · GENITOURINARY:  no dysuria,   no frequency,       · MUSCULOSKELETAL:   no back pain,   no neck pain,   no weakness.    · SKIN:   no pruritis,   no rashes.    · NEURO:   no loss of consciousness,   no headache,   no weakness.          ALLERGIC/IMMUNOLOGIC:   No active allergic or immunologic issues    all other systems are negative      PHYSICAL EXAM:     · CONSTITUTIONAL:   not septic appearing,   well nourished,   NAD    · ENMT:   Airway patent,   Nasal mucosa clear.  Mouth with normal mucosa.   No thrush    · EYES:   Clear bilaterally,   pupils equal,   round and reactive to light.    · CARDIAC:   Normal rate,   regular rhythm.    Heart sounds S1, S2.   no thrills or bruits on palpitation  normal  cardiac impulse  No murmurs, no rubs or gallops on auscultation  no edema        CAROTID:   normal systolic impulse  no bruits    · RESPIRATORY:   no w/r/r/,   normal chest expansion  not tachypneic,  no retractions or use of accessory muscles  palpation of chest is normal with no fremitus  percussion of chest demonstrates no hyperresonance or dullness    · GASTROINTESTINAL:  Abdomen soft,   non-tender,   no guarding,   + BS  liver spleen not palpable      · MUSCULOSKELETAL:   range of motion is not limited,  no clubbing, cyanosis  no petechiae      · SKIN:   Skin normal color for race,   warm,   dry and intact.       · PSYCHIATRIC:   Alert and oriented to person,   place, time/situation.   normal mood and affect.   no apparent risk to self or others.

## 2021-08-31 NOTE — CONSULT NOTE ADULT - TIME BILLING
I have personally seen and examined this patient.    I have reviewed all pertinent clinical information and reviewed all relevant imaging and diagnostic studies personally.   I counseled the patient about diagnostic testing and treatment plan. All questions were answered.   I discussed recommendations with the primary team.
Coordination of care

## 2021-08-31 NOTE — CHART NOTE - NSCHARTNOTEFT_GEN_A_CORE
appreciate abdominal x-ray, x-ray in AM again if findings are persistent will obtain CT scan with IV and oral contrast tomorrow appreciate abdominal x-ray, x-ray in AM again if findings are persistent will obtain CT scan with IV and oral contrast tomorrow  -discussed with attending

## 2021-08-31 NOTE — PROGRESS NOTE ADULT - SUBJECTIVE AND OBJECTIVE BOX
89yFemale  Being followed for anemia, r/o GI bleed  Interval history: Patient denies nausea, vomiting, hematemesis, melena, blood in stool, diarrhea, constipation. Patient notes RLQ and LLQ pain.      PAST MEDICAL & SURGICAL HISTORY:   Diffuse large B cell lymphoma    Overactive bladder    History of ITP    S/P total knee arthroplasty  bilateral            Social History: No smoking. No alcohol. No illegal drug use.          MEDICATIONS  (STANDING):  allopurinol 300 milliGRAM(s) Oral daily  betamethasone valerate 0.1% Cream 1 Application(s) Topical two times a day  cefTRIAXone   IVPB 1000 milliGRAM(s) IV Intermittent every 24 hours  cholecalciferol 1000 Unit(s) Oral daily  ferrous    sulfate 325 milliGRAM(s) Oral two times a day  folic acid 1 milliGRAM(s) Oral daily  latanoprost 0.005% Ophthalmic Solution 1 Drop(s) Both EYES at bedtime  Mitocore Daily Supplement 1 Capsule(s) 1 Capsule(s) Oral daily  pantoprazole  Injectable 40 milliGRAM(s) IV Push two times a day  polyethylene glycol 3350 17 Gram(s) Oral daily  predniSONE   Tablet 2.5 milliGRAM(s) Oral daily  senna 2 Tablet(s) Oral at bedtime    MEDICATIONS  (PRN):  acetaminophen   Tablet .. 650 milliGRAM(s) Oral every 6 hours PRN Mild Pain (1 - 3)  albuterol/ipratropium for Nebulization 3 milliLiter(s) Nebulizer every 6 hours PRN Shortness of Breath and/or Wheezing  aluminum hydroxide/magnesium hydroxide/simethicone Suspension 30 milliLiter(s) Oral every 4 hours PRN Dyspepsia  diphenhydrAMINE 25 milliGRAM(s) Oral every 8 hours PRN Rash and/or Itching  ondansetron Injectable 4 milliGRAM(s) IV Push every 6 hours PRN Nausea and/or Vomiting      Allergies:  iodine (Unknown)  penicillins (Unknown)  Vicodin (Unknown)            REVIEW OF SYSTEMS:  General:  No fevers, or chills.  Eyes:  No reported pain or visual changes  ENT:  No sore throat or runny nose.  NECK: No stiffness   CV:  No chest pain or palpitations.  Resp:  No shortness of breath, cough  GI:  +RLQ and LLQ abdominal pain, No nausea, vomiting, dysphagia, diarrhea or constipation. No rectal bleeding, melena, or hematemesis.  Neuro:  No tingling, numbness           VITAL SIGNS:   T(F): 97.1 (08-31-21 @ 05:00), Max: 97.1 (08-31-21 @ 05:00)  HR: 89 (08-31-21 @ 05:00) (73 - 89)  BP: 145/77 (08-31-21 @ 05:00) (111/61 - 145/77)  RR: 18 (08-31-21 @ 05:00) (18 - 18)  SpO2: --    PHYSICAL EXAM:  GENERAL: AAOx3, no acute distress.  HEAD:  Atraumatic, Normocephalic  EYES: conjunctiva and sclera clear  NECK: Supple, no JVD or thyromegaly  CHEST/LUNG: Clear to auscultation bilaterally; No wheeze, rhonchi, or rales  HEART: Regular rate and rhythm; normal S1, S2, No murmurs.  ABDOMEN: Soft, nontender, +distended; Bowel sounds present  NEUROLOGY: No asterixis or tremor.   SKIN: Intact, no jaundice            LABS:                        9.9    5.65  )-----------( 77       ( 31 Aug 2021 06:57 )             29.8     08-31    127<L>  |  92<L>  |  29<H>  ----------------------------<  133<H>  4.1   |  24  |  1.2    Ca    8.6      31 Aug 2021 06:57    TPro  5.0<L>  /  Alb  3.0<L>  /  TBili  0.6  /  DBili  x   /  AST  18  /  ALT  17  /  AlkPhos  68  08-31    LIVER FUNCTIONS - ( 31 Aug 2021 06:57 )  Alb: 3.0 g/dL / Pro: 5.0 g/dL / ALK PHOS: 68 U/L / ALT: 17 U/L / AST: 18 U/L / GGT: x               IMAGING:    < from: Xray Chest 1 View-PORTABLE IMMEDIATE (Xray Chest 1 View-PORTABLE IMMEDIATE .) (08.29.21 @ 09:48) >    EXAM:  XR CHEST PORTABLE IMMED 1V            PROCEDURE DATE:  08/29/2021            INTERPRETATION:  Clinical History / Reason for exam: 89-year-old female with shortness of breath and anemia.    Comparison : Chest radiograph performed 9/19/2020.    Technique/Positioning: Upright, AP portable.    Findings:    Support devices: Precordial leads are present.    Cardiac/mediastinum/hilum: The cardiac silhouette is magnified.  There are thoracic aortic calcifications.    Lung parenchyma/Pleura: No focal pulmonary opacity, pleural effusion or pneumothorax is seen.    Skeleton/soft tissues: Grossly stable.    Impression:    No radiographic evidence of acute cardiopulmonary disease.        --- End of Report ---              ANABEL MOCTEZUMA MD; Attending Radiologist  This document has been electronically signed. Aug 29 2021 11:58AM    < end of copied text >

## 2021-08-31 NOTE — CONSULT NOTE ADULT - ASSESSMENT
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    IMPRESSION  #Fevers  - BLood Cx 8/28 NG  - Urine Cx 8/29 > 3 organisms    #NHL   #ITP on prednisone  #Iron deficiency anemia  #Obesity BMI (kg/m2): 28.7  #Abx allergy: iodine (Unknown)  Vicodin (Unknown)    RECOMMENDATIONS  This is a preliminary incomplete pended note, all final recommendations to follow after interview and examination of the patient.    Please call or message on [employer] Teams if with any questions.  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    IMPRESSION  #Fevers - possible UTI   - BLood Cx 8/28 NG  - Urine Cx 8/29 > 3 organisms    #NHL - reports being on ibrutinib   #ITP on prednisone  #Iron deficiency anemia  #Obesity BMI (kg/m2): 28.7  #Abx allergy: iodine (Unknown)  Vicodin (Unknown)    RECOMMENDATIONS  - repeat UA and Urine Cx  - continue ceftriaxone 1g daily for now  - if recurrent fevers, repeat blood cultures and would obtain CT Chest/Abd/Pelvis   -   This is a preliminary incomplete pended note, all final recommendations to follow after interview and examination of the patient.    Please call or message on [employer] Teams if with any questions.  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    IMPRESSION  #Fevers - possible UTI   - BLood Cx 8/28 NG  - Urine Cx 8/29 > 3 organisms    #NHL - reports being on ibrutinib   #ITP on prednisone  #Iron deficiency anemia  #Obesity BMI (kg/m2): 28.7  #Abx allergy: iodine (Unknown)  Vicodin (Unknown)    RECOMMENDATIONS  - repeat UA and Urine Cx  - continue ceftriaxone 1g daily for now  - if recurrent fevers, repeat blood cultures and would obtain CT Chest/Abd/Pelvis (as patient recently on ibrutinib) and can broaden to cefepime   - trend H/H    Please call or message on [employer] Teams if with any questions.  Spectra [phone]

## 2021-08-31 NOTE — PROGRESS NOTE ADULT - ASSESSMENT
Patient is 89 yo female with hx of Diffuse large B cell lymphoma, History of ITP, and Overactive bladder presenting with     1.   UTI  -Continue with current antibiotic  -UC shows multiple organism  -will repeat Urine culture   -ID to follow up     2. Anemia  -Unclear Etiology  -Blood per stool as per RN  -S/P 2 unit of RbC transfusion  -H/H stable   -Monitor H/H  -GI to follow up   -PPI IV   -If bleeding reoccured, will consider abdm CTA      3.   Hyponatremia  -Obtain urine, and serum osmolarity, and urine sodium  - 1liter fluid restriction  -Monitor Sodium level  -Improving     4.  HX of Diffuse large B cell lymphoma  -Hold off on the new medications, might be contributing to rash involving upper extremities  -steroid cream    5.  SOB  -Seems to be secondary to volume overload  -CXR shows pulmonary vascular congestion  -IV lasix  -TTE shows  Left ventricular ejection fraction, by visual estimation, is 40 to 45%.  moderate mitral valve regurgitation, mild PH.  The entire apex is hypokinetic  -Monitor I/O   -cardiology consult   -continue with po lasix   -resolved    #Progress Note Handoff  Pending (specify):  resolution of fever/cardiology consult/Gi recommendation  Family discussion:  plan of care was discussed with patient  and daughter  in details.  all questions were answered.  seems to understand, and in agreement  Disposition:  unknown.

## 2021-08-31 NOTE — PROGRESS NOTE ADULT - ASSESSMENT
89yFemale large B cell lymphoma on immunotherapy, JASBIR, ITP presents for weakness. GI consulted for anemia and possible GI bleed. Patient reports possible rectal bleeding on and off for a month as well as hematuria. Patient reports colonoscopy a long time ago and normal.      Problem 1-Abdominal pain and distention  Rec  -Abdominal x-ray two views obstructive series if normal Miralax BID and senna BID  -also start bentyl 20mg QID if no improvement in abdominal pain and x-ray is normal    Problem 2-Anemia possible rectal bleeding for a month, melena?  rectal exam-brown stool in vault  ddx hemorrhoids, r/o malignancy,   Rec  -No further bleeding h and h stable, will hold off on EGD/Colonoscopy unless active GI bleeding   -Maintain Hemodynamic Stability   -Monitor CBC  -CMP,Optimize Electrolytes  -Transfuse prn to hgb >8  -Two large bore IV lines  - PPI BID  -Monitor Vital Signs  -Monitor Stool For blood, frequency, consistency, melena  -Active Type and Screen

## 2021-08-31 NOTE — CONSULT NOTE ADULT - SUBJECTIVE AND OBJECTIVE BOX
NEYMAR WILLARD  89y, Female  Allergy: iodine (Unknown)  penicillins (Unknown)  Vicodin (Unknown)      CHIEF COMPLAINT: SOB (30 Aug 2021 15:52)      LOS  2d    HPI:  (Patient is very sleepy, gives minimal information. No family at bedside. 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 Fever noted on arrival, improved with motrin given in ER. There were no complaints of abdominal or chest pain, dizziness or shortness of breath. In ER found to have anemia along with evidence for  UTI (29 Aug 2021 04:15)      INFECTIOUS DISEASE HISTORY:  History as above.   ID consulted for fevers on morning of 8/30  Started on ceftriaxone 8/29 empirically for UTI as UA with many WBC  CXR without infiltrate      PAST MEDICAL & SURGICAL HISTORY:  Diffuse large B cell lymphoma    Overactive bladder    History of ITP    S/P total knee arthroplasty  bilateral        FAMILY HISTORY  Family history unknown        SOCIAL HISTORY  Social History:  Unable to obtain from patient at this time (but subsequently noted to not smoke or use alcohol) (29 Aug 2021 04:15)        ROS  General: Denies rigors, nightsweats  HEENT: Denies headache, rhinorrhea, sore throat, eye pain  CV: Denies CP, palpitations  PULM: Denies wheezing, hemoptysis  GI: Denies hematemesis, hematochezia, melena  : Denies discharge, hematuria  MSK: Denies arthralgias, myalgias  SKIN: Denies rash, lesions  NEURO: Denies paresthesias, weakness  PSYCH: Denies depression, anxiety    VITALS:  T(F): 97.1, Max: 97.2 (08-30-21 @ 09:29)  HR: 89  BP: 145/77  RR: 18Vital Signs Last 24 Hrs  T(C): 36.2 (31 Aug 2021 05:00), Max: 36.2 (30 Aug 2021 09:29)  T(F): 97.1 (31 Aug 2021 05:00), Max: 97.2 (30 Aug 2021 09:29)  HR: 89 (31 Aug 2021 05:00) (73 - 89)  BP: 145/77 (31 Aug 2021 05:00) (111/61 - 145/77)  BP(mean): --  RR: 18 (31 Aug 2021 05:00) (18 - 18)  SpO2: --    PHYSICAL EXAM:  Gen: NAD, resting in bed  HEENT: Normocephalic, atraumatic  Neck: supple, no lymphadenopathy  CV: Regular rate & regular rhythm  Lungs: decreased BS at bases, no fremitus  Abdomen: Soft, BS present  Ext: Warm, well perfused  Neuro: non focal, awake  Skin: no rash, no erythema  Lines: no phlebitis    TESTS & MEASUREMENTS:                        9.9    5.65  )-----------( 77       ( 31 Aug 2021 06:57 )             29.8     08-30    129<L>  |  93<L>  |  25<H>  ----------------------------<  110<H>  4.0   |  24  |  1.3    Ca    8.2<L>      30 Aug 2021 06:59    TPro  4.9<L>  /  Alb  3.0<L>  /  TBili  0.8  /  DBili  x   /  AST  21  /  ALT  17  /  AlkPhos  68  08-30      LIVER FUNCTIONS - ( 30 Aug 2021 06:59 )  Alb: 3.0 g/dL / Pro: 4.9 g/dL / ALK PHOS: 68 U/L / ALT: 17 U/L / AST: 21 U/L / GGT: x               Culture - Urine (collected 08-29-21 @ 03:10)  Source: Clean Catch Clean Catch (Midstream)  Final Report (08-30-21 @ 16:23):    >=3 organisms. Probable collection contamination.    Culture - Blood (collected 08-28-21 @ 23:40)  Source: .Blood Blood-Peripheral  Preliminary Report (08-30-21 @ 18:02):    No growth to date.    Culture - Blood (collected 08-28-21 @ 23:40)  Source: .Blood Blood-Peripheral  Preliminary Report (08-30-21 @ 18:02):    No growth to date.        Lactate, Blood: 1.7 mmol/L (08-28-21 @ 23:10)      INFECTIOUS DISEASES TESTING  COVID-19 PCR: NotDetec (08-28-21 @ 23:10)      RADIOLOGY & ADDITIONAL TESTS:  I have personally reviewed the last Chest xray  CXR      CT      CARDIOLOGY TESTING  12 Lead ECG:   Ventricular Rate 70 BPM    Atrial Rate 70 BPM    P-R Interval 162 ms    QRS Duration 116 ms    Q-T Interval 416 ms    QTC Calculation(Bazett) 449 ms    P Axis 77 degrees    R Axis -24 degrees    T Axis 61 degrees    Diagnosis Line Sinus rhythm withPremature atrial complexes  Otherwise normal ECG    Confirmed by SHON LEMONS MD ([id number]) on 8/30/2021 11:32:10 AM (08-29-21 @ 07:28)  12 Lead ECG:   Ventricular Rate 90 BPM    Atrial Rate 90 BPM    P-R Interval 142 ms    QRS Duration 112 ms    Q-T Interval 356 ms    QTC Calculation(Bazett) 435 ms    P Axis 73 degrees    R Axis -39 degrees    T Axis 66 degrees    Diagnosis Line Normal sinus rhythm  Left axis deviation  Abnormal ECG    Confirmed by SHON LEMONS MD ([id number]) on 8/29/2021 10:19:28 AM (08-28-21 @ 22:39)      MEDICATIONS  allopurinol 300 Oral daily  betamethasone valerate 0.1% Cream 1 Topical two times a day  cefTRIAXone   IVPB 1000 IV Intermittent every 24 hours  cholecalciferol 1000 Oral daily  ferrous    sulfate 325 Oral two times a day  folic acid 1 Oral daily  latanoprost 0.005% Ophthalmic Solution 1 Both EYES at bedtime  Mitocore Daily Supplement 1 Capsule(s) 1 Oral daily  pantoprazole  Injectable 40 IV Push two times a day  polyethylene glycol 3350 17 Oral daily  predniSONE   Tablet 2.5 Oral daily  senna 2 Oral at bedtime      Weight  Weight (kg): 75.9 (08-29-21 @ 05:50)    ANTIBIOTICS:  cefTRIAXone   IVPB 1000 milliGRAM(s) IV Intermittent every 24 hours      ALLERGIES:  iodine (Unknown)  penicillins (Unknown)  Vicodin (Unknown)       NEYMAR WILLARD  89y, Female  Allergy: iodine (Unknown)  penicillins (Unknown)  Vicodin (Unknown)      CHIEF COMPLAINT: SOB (30 Aug 2021 15:52)      LOS  2d    HPI:  (Patient is very sleepy, gives minimal information. No family at bedside. 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 Fever noted on arrival, improved with motrin given in ER. There were no complaints of abdominal or chest pain, dizziness or shortness of breath. In ER found to have anemia along with evidence for  UTI (29 Aug 2021 04:15)      INFECTIOUS DISEASE HISTORY:  History as above.   ID consulted for fevers on morning of 8/30  Started on ceftriaxone 8/29 empirically for UTI as UA with many WBC  CXR without infiltrate.  She has been having on and off dysuria. although may be chronic issue.   She does report new onset of urinary uregency.   Otherwise, denies any nausea, vomiting, abdominal pain.   Denies any flank pain.   Reports shortness of breath but no coughing.         PAST MEDICAL & SURGICAL HISTORY:  Diffuse large B cell lymphoma    Overactive bladder    History of ITP    S/P total knee arthroplasty  bilateral        FAMILY HISTORY  Family history unknown        SOCIAL HISTORY  Social History:  Unable to obtain from patient at this time (but subsequently noted to not smoke or use alcohol) (29 Aug 2021 04:15)        ROS  General: Denies rigors, nightsweats  HEENT: Denies headache, rhinorrhea, sore throat, eye pain  CV: Denies CP, palpitations  PULM: Denies wheezing, hemoptysis  GI: Denies hematemesis, hematochezia, melena  : Denies discharge, hematuria  MSK: Denies arthralgias, myalgias  SKIN: Denies rash, lesions  NEURO: Denies paresthesias, weakness  PSYCH: Denies depression, anxiety    VITALS:  T(F): 97.1, Max: 97.2 (08-30-21 @ 09:29)  HR: 89  BP: 145/77  RR: 18Vital Signs Last 24 Hrs  T(C): 36.2 (31 Aug 2021 05:00), Max: 36.2 (30 Aug 2021 09:29)  T(F): 97.1 (31 Aug 2021 05:00), Max: 97.2 (30 Aug 2021 09:29)  HR: 89 (31 Aug 2021 05:00) (73 - 89)  BP: 145/77 (31 Aug 2021 05:00) (111/61 - 145/77)  BP(mean): --  RR: 18 (31 Aug 2021 05:00) (18 - 18)  SpO2: --    PHYSICAL EXAM:  Gen: NAD, resting in bed  HEENT: Normocephalic, atraumatic  Neck: supple, no lymphadenopathy  CV: Regular rate & regular rhythm  Lungs: decreased BS at bases, no fremitus  Abdomen: Soft, BS present  Ext: Warm, well perfused  Neuro: non focal, awake  Skin: no rash, no erythema  Lines: no phlebitis    TESTS & MEASUREMENTS:                        9.9    5.65  )-----------( 77       ( 31 Aug 2021 06:57 )             29.8     08-30    129<L>  |  93<L>  |  25<H>  ----------------------------<  110<H>  4.0   |  24  |  1.3    Ca    8.2<L>      30 Aug 2021 06:59    TPro  4.9<L>  /  Alb  3.0<L>  /  TBili  0.8  /  DBili  x   /  AST  21  /  ALT  17  /  AlkPhos  68  08-30      LIVER FUNCTIONS - ( 30 Aug 2021 06:59 )  Alb: 3.0 g/dL / Pro: 4.9 g/dL / ALK PHOS: 68 U/L / ALT: 17 U/L / AST: 21 U/L / GGT: x               Culture - Urine (collected 08-29-21 @ 03:10)  Source: Clean Catch Clean Catch (Midstream)  Final Report (08-30-21 @ 16:23):    >=3 organisms. Probable collection contamination.    Culture - Blood (collected 08-28-21 @ 23:40)  Source: .Blood Blood-Peripheral  Preliminary Report (08-30-21 @ 18:02):    No growth to date.    Culture - Blood (collected 08-28-21 @ 23:40)  Source: .Blood Blood-Peripheral  Preliminary Report (08-30-21 @ 18:02):    No growth to date.        Lactate, Blood: 1.7 mmol/L (08-28-21 @ 23:10)      INFECTIOUS DISEASES TESTING  COVID-19 PCR: NotDetec (08-28-21 @ 23:10)      RADIOLOGY & ADDITIONAL TESTS:  I have personally reviewed the last Chest xray  CXR      CT      CARDIOLOGY TESTING  12 Lead ECG:   Ventricular Rate 70 BPM    Atrial Rate 70 BPM    P-R Interval 162 ms    QRS Duration 116 ms    Q-T Interval 416 ms    QTC Calculation(Bazett) 449 ms    P Axis 77 degrees    R Axis -24 degrees    T Axis 61 degrees    Diagnosis Line Sinus rhythm withPremature atrial complexes  Otherwise normal ECG    Confirmed by SHON LEMONS MD ([id number]) on 8/30/2021 11:32:10 AM (08-29-21 @ 07:28)  12 Lead ECG:   Ventricular Rate 90 BPM    Atrial Rate 90 BPM    P-R Interval 142 ms    QRS Duration 112 ms    Q-T Interval 356 ms    QTC Calculation(Bazett) 435 ms    P Axis 73 degrees    R Axis -39 degrees    T Axis 66 degrees    Diagnosis Line Normal sinus rhythm  Left axis deviation  Abnormal ECG    Confirmed by SHON LEMONS MD ([id number]) on 8/29/2021 10:19:28 AM (08-28-21 @ 22:39)      MEDICATIONS  allopurinol 300 Oral daily  betamethasone valerate 0.1% Cream 1 Topical two times a day  cefTRIAXone   IVPB 1000 IV Intermittent every 24 hours  cholecalciferol 1000 Oral daily  ferrous    sulfate 325 Oral two times a day  folic acid 1 Oral daily  latanoprost 0.005% Ophthalmic Solution 1 Both EYES at bedtime  Mitocore Daily Supplement 1 Capsule(s) 1 Oral daily  pantoprazole  Injectable 40 IV Push two times a day  polyethylene glycol 3350 17 Oral daily  predniSONE   Tablet 2.5 Oral daily  senna 2 Oral at bedtime      Weight  Weight (kg): 75.9 (08-29-21 @ 05:50)    ANTIBIOTICS:  cefTRIAXone   IVPB 1000 milliGRAM(s) IV Intermittent every 24 hours      ALLERGIES:  iodine (Unknown)  penicillins (Unknown)  Vicodin (Unknown)

## 2021-08-31 NOTE — PROGRESS NOTE ADULT - SUBJECTIVE AND OBJECTIVE BOX
CC  SOB  HPI.  Patient reports that she feels better.  SOB resolving.  Offers no other complaints      offers no other complaints              Constitutional: No fever, fatigue or weight loss.  Skin: No rash.  Eyes: No recent vision problems or eye pain.  ENT: No congestion, ear pain, or sore throat.  Endocrine: No thyroid problems.  Cardiovascular: No chest pain or palpation.  Respiratory: No cough,  congestion, or wheezing.  Gastrointestinal: No abdominal pain, nausea, vomiting, or diarrhea.  Genitourinary: No dysuria.  Musculoskeletal: No joint swelling.  Neurologic: No headache.      Vital Signs Last 24 Hrs  T(C): 35.9 (31 Aug 2021 17:49), Max: 36.8 (31 Aug 2021 13:40)  T(F): 96.7 (31 Aug 2021 17:49), Max: 98.3 (31 Aug 2021 13:40)  HR: 95 (31 Aug 2021 17:49) (89 - 100)  BP: 110/75 (31 Aug 2021 17:49) (110/75 - 145/77)  BP(mean): --  RR: 18 (31 Aug 2021 17:49) (18 - 18)  SpO2: --        PHYSICAL EXAM-  GENERAL: NAD,   HEAD:  Atraumatic, Normocephalic  EYES: EOMI, PERRLA, conjunctiva and sclera clear  NECK: Supple, No JVD, Normal thyroid  NERVOUS SYSTEM:  Alert  moving all extremities  CHEST/LUNG:  Min crackles with good air entry  HEART: Regular rate and rhythm; No murmurs, rubs, or gallops  ABDOMEN: Soft, Nontender, Nondistended; Bowel sounds present  EXTREMITIES:    No clubbing, cyanosis, or edema  SKIN: No rashes or lesions                              9.9    5.65  )-----------( 77       ( 31 Aug 2021 06:57 )             29.8     08-31    127<L>  |  92<L>  |  29<H>  ----------------------------<  133<H>  4.1   |  24  |  1.2    Ca    8.6      31 Aug 2021 06:57    TPro  5.0<L>  /  Alb  3.0<L>  /  TBili  0.6  /  DBili  x   /  AST  18  /  ALT  17  /  AlkPhos  68  08-31                                 MEDICATIONS  (STANDING):  allopurinol 300 milliGRAM(s) Oral daily  betamethasone valerate 0.1% Cream 1 Application(s) Topical two times a day  cefTRIAXone   IVPB 1000 milliGRAM(s) IV Intermittent every 24 hours  cholecalciferol 1000 Unit(s) Oral daily  ferrous    sulfate 325 milliGRAM(s) Oral two times a day  folic acid 1 milliGRAM(s) Oral daily  latanoprost 0.005% Ophthalmic Solution 1 Drop(s) Both EYES at bedtime  Mitocore Daily Supplement 1 Capsule(s) 1 Capsule(s) Oral daily  pantoprazole  Injectable 40 milliGRAM(s) IV Push two times a day  polyethylene glycol 3350 17 Gram(s) Oral daily  predniSONE   Tablet 2.5 milliGRAM(s) Oral daily  senna 2 Tablet(s) Oral at bedtime    MEDICATIONS  (PRN):  acetaminophen   Tablet .. 650 milliGRAM(s) Oral every 6 hours PRN Mild Pain (1 - 3)  albuterol/ipratropium for Nebulization 3 milliLiter(s) Nebulizer every 6 hours PRN Shortness of Breath and/or Wheezing  diphenhydrAMINE 25 milliGRAM(s) Oral every 8 hours PRN Rash and/or Itching  ondansetron Injectable 4 milliGRAM(s) IV Push every 6 hours PRN Nausea and/or Vomiting      Imaging Personally Reviewed:     [x ] YES  [ ] NO    Consultant(s) Notes Reviewed:  [x ] YES  [ ] NO    Care Discussed with Consultants/Other Providers [x ] YES  [ ] No medical contraindication for discharge

## 2021-09-01 NOTE — PHYSICAL THERAPY INITIAL EVALUATION ADULT - GENERAL OBSERVATIONS, REHAB EVAL
13:40-14:10 Chart reviewed. Pt encountered supine  in bed, may be seen by Physical Therapist as confirmed with Nurse. Patient denied pain at rest but c/o "neuropathy and unsteady gait"; +Primafit/heplock; ecchymoses on extremities

## 2021-09-01 NOTE — CONSULT NOTE ADULT - SUBJECTIVE AND OBJECTIVE BOX
CARDIOLOGY CONSULT NOTE     CHIEF COMPLAINT/REASON FOR CONSULT:    HPI:  (Patient is very sleepy, gives minimal information. No family at bedside. 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 Fever noted on arrival, improved with motrin given in ER. There were no complaints of abdominal or chest pain, dizziness or shortness of breath. In ER found to have anemia along with evidence for  UTI (29 Aug 2021 04:15)      PAST MEDICAL & SURGICAL HISTORY:  Diffuse large B cell lymphoma    Overactive bladder    History of ITP    S/P total knee arthroplasty  bilateral        Cardiac Risks:   [ ]HTN, [ ] DM, [ ] Smoking, [ ] FH,  [ ] Lipids      ECHO : < from: TTE Echo Complete w/o Contrast w/ Doppler (08.30.21 @ 08:51) >    Summary:   1. Left ventricular ejection fraction, by visual estimation, is 40 to 45%.   2. Entire apex is abnormal as described above.   3. Mild left ventricular hypertrophy.   4. Mildly enlarged left atrium.   5. Mild mitral annular calcification.   6. Moderate mitral valve regurgitation.   7. Mild tricuspid regurgitation.   8. Moderate aortic valve stenosis.   9. Estimated pulmonary artery systolic pressure is 40.7 mmHg assuming a right atrial pressure of 3 mmHg, which is consistent with mild pulmonary hypertension.  10. Mitral valve mean gradientis 64.4 mmHg consistent with severe mitral stenosis.  11. There is mild aortic root calcification.    PHYSICIAN INTERPRETATION:  Left Ventricle: There is mild left ventricular hypertrophy. Left ventricular ejection fraction, by visual estimation, is 40 to 45%.    < end of copied text >          MEDICATIONS:  MEDICATIONS  (STANDING):  allopurinol 300 milliGRAM(s) Oral daily  betamethasone valerate 0.1% Cream 1 Application(s) Topical two times a day  cholecalciferol 1000 Unit(s) Oral daily  ferrous    sulfate 325 milliGRAM(s) Oral two times a day  folic acid 1 milliGRAM(s) Oral daily  latanoprost 0.005% Ophthalmic Solution 1 Drop(s) Both EYES at bedtime  Mitocore Daily Supplement 1 Capsule(s) 1 Capsule(s) Oral daily  pantoprazole  Injectable 40 milliGRAM(s) IV Push two times a day  polyethylene glycol 3350 17 Gram(s) Oral daily  predniSONE   Tablet 2.5 milliGRAM(s) Oral daily  senna 2 Tablet(s) Oral at bedtime      FAMILY HISTORY:  Family history unknown        SOCIAL HISTORY:      [ ] Marital status    Allergies    iodine (Unknown)  penicillins (Unknown)  Vicodin (Unknown)        	    REVIEW OF SYSTEMS:  CONSTITUTIONAL: No fever, weight loss, or fatigue  EYES: No eye pain, visual disturbances, or discharge  ENMT:  No difficulty hearing, tinnitus, vertigo; No sinus or throat pain  NECK: No pain or stiffness  RESPIRATORY: No cough, wheezing, chills or hemoptysis; No Shortness of Breath  CARDIOVASCULAR: No chest pain, palpitations, passing out, dizziness, or leg swelling  GASTROINTESTINAL: No abdominal or epigastric pain. No nausea, vomiting, or hematemesis; No diarrhea or constipation. No melena or hematochezia.  GENITOURINARY: No dysuria, frequency, hematuria, or incontinence  NEUROLOGICAL: No headaches, memory loss, loss of strength, numbness, or tremors  SKIN: No itching, burning, rashes, or lesions   	        PHYSICAL EXAM:  T(C): 36.4 (09-01-21 @ 05:43), Max: 36.8 (08-31-21 @ 13:40)  HR: 91 (09-01-21 @ 05:43) (63 - 110)  BP: 109/65 (09-01-21 @ 05:43) (109/65 - 156/68)  RR: 16 (09-01-21 @ 05:43) (16 - 18)  SpO2: 98% (08-31-21 @ 20:25) (98% - 98%)  Wt(kg): --  I&O's Summary    31 Aug 2021 07:01  -  01 Sep 2021 07:00  --------------------------------------------------------  IN: 0 mL / OUT: 375 mL / NET: -375 mL        Appearance: Normal	  Psychiatry: A & O x 3, Mood & affect appropriate  HEENT:   Normal oral mucosa, PERRL, EOMI	  Lymphatic: No lymphadenopathy  Cardiovascular: Normal S1 S2,RRR, No JVD, No murmurs  Respiratory: Lungs clear to auscultation	  Gastrointestinal:  Soft, Non-tender, + BS	  Skin: No rashes, No ecchymoses, No cyanosis	  Neurologic: Non-focal  Extremities: Normal range of motion, No clubbing, cyanosis or edema  Vascular: Peripheral pulses palpable 2+ bilaterally      ECG:  	< from: 12 Lead ECG (08.29.21 @ 07:28) >    Diagnosis Line Sinus rhythm withPremature atrial complexes  Otherwise normal ECG    Confirmed by SHON LEMONS MD ([id number]) on 8/30/2021 11:32:10 AM    < end of copied text >      	  LABS:	 	    CARDIAC MARKERS:                                    10.0   8.61  )-----------( 92       ( 01 Sep 2021 06:55 )             31.0     09-01    125<L>  |  90<L>  |  37<H>  ----------------------------<  144<H>  4.7   |  22  |  1.8<H>    Ca    9.2      01 Sep 2021 06:55    TPro  5.0<L>  /  Alb  3.0<L>  /  TBili  0.6  /  DBili  x   /  AST  18  /  ALT  17  /  AlkPhos  68  08-31

## 2021-09-01 NOTE — PROGRESS NOTE ADULT - ASSESSMENT
Patient is 89 yo female with hx of Diffuse large B cell lymphoma, History of ITP, and Overactive bladder presenting with     # UTI  - UC shows multiple organism  - cont Rocephin for now  - f/u repeat Urine culture   - ID to follow up     # Anemia, Unclear Etiology; r/o acute blood loss anemia  # Bright red blood per rectum, possible hemorrhoidal, r/o malignancy, polyp, diverticular  # Xray concerning for ileus vs obstruction  - Blood per stool as per RN  - S/P 2 unit of RbC transfusion  - H/H stable   PLAN  - Monitor H/H  - GI on board  - possibly may need inpatient EGD/Colonoscopy after CT scan done and electrolytes are optimized sodium (> 130)  - will start Anusol cream  - PPI IV   - f/u CT abd with PO contrast to r/o SBO    #Hyponatremia, unclear etiology- worsening  #KATHIA  - will start gentle hydration NS 60cc/hr  - f/u repeat BMP  - f/u serum osm, urine osm, urine Na  - f/u Renal consult    #HX of Diffuse large B cell lymphoma  - Hold off on the new medications, might be contributing to rash involving upper extremities  - cont steroid cream    #SOB (improved)  #mild systolic CHF- does not appear to be in decompensated CHF at this time (on my evaluation on 9/1)  - TTE shows  Left ventricular ejection fraction, by visual estimation, is 40 to 45%.  moderate mitral valve regurgitation, mild PH.  The entire apex is hypokinetic  PLAN  - cardiology consult appreciated  - Recommended starting ASA if able to and low dose BB (metoprolol 12.5mg daily) and in future if renal agrees may consider losartan 12.5mg daily  - will need outpatient f/u with cardiology      #Progress Note Handoff  Pending (specify):  CT abd, GI f/u, Renal consult  Family discussion:  plan of care was discussed with patient  and daughter  in details.  all questions were answered.  seems to understand, and in agreement  Disposition:  unknown

## 2021-09-01 NOTE — PHYSICAL THERAPY INITIAL EVALUATION ADULT - PERTINENT HX OF CURRENT PROBLEM, REHAB EVAL
89 y/o female admitted with diagnoses of Acute UTI. low Hemoglobin with c/o vomiting at home; s/p blood transfusion, for possible EGD/Colonoscopy

## 2021-09-01 NOTE — PHYSICAL THERAPY INITIAL EVALUATION ADULT - ADDITIONAL COMMENTS
Per patient, she lives alone in a townhouse with 3 steps with bilateral handrails to enter home, then has 13 steps with (L) handrail to go up to bedroom and another 13 steps with (R) handrail to go down to basement

## 2021-09-01 NOTE — PHYSICAL THERAPY INITIAL EVALUATION ADULT - REHAB POTENTIAL, PT EVAL
good, to achieve stated therapy goals Niacinamide Pregnancy And Lactation Text: These medications are considered safe during pregnancy.

## 2021-09-01 NOTE — CONSULT NOTE ADULT - ASSESSMENT
Patient with above history. She sees cardiology Lisette. Not sure why . She gets NORMAN, No chest pain. Now lying flat. Patient lv mildly impaired. She has extensive medical history. Start asa 81 if able to use with her history . Low dose beta if tolerates . Can try metoprolol 12.5 once a day. In future if renal agrees losartan 12.5 a day. F/U cardiologist Lisette . Prognosis guarded

## 2021-09-01 NOTE — PROGRESS NOTE ADULT - ASSESSMENT
89yFemale large B cell lymphoma on immunotherapy, JABSIR, ITP presents for weakness. GI consulted for anemia and possible GI bleed. Patient reports possible rectal bleeding on and off for a month as well as hematuria. Patient reports colonoscopy a long time ago and normal.      Problem 1-Abdominal pain and distention  Rec  -appreciate x-ray please obtain CT scan abdomen pelvis IV and PO contrast, if renal function does not allow then just get oral contrast    Problem 2-Anemia possible rectal bleeding for a month, melena?  rectal exam-brown stool in vault  ddx hemorrhoids, r/o malignancy,   Rec  -to consider EGD/Colonoscopy after CT scan done   -start Anusol cream  -Maintain Hemodynamic Stability   -Monitor CBC  -CMP,Optimize Electrolytes  -Transfuse prn to hgb >8  -Two large bore IV lines  - PPI BID  -Monitor Vital Signs  -Monitor Stool For blood, frequency, consistency, melena  -Active Type and Screen   89yFemale large B cell lymphoma on immunotherapy, JASBIR, ITP presents for weakness. GI consulted for anemia and possible GI bleed. Patient reports possible rectal bleeding on and off for a month as well as hematuria. Patient reports colonoscopy a long time ago and normal.      Problem 1-Abdominal pain and distention  Rec  -appreciate x-ray please obtain CT scan abdomen pelvis IV and PO contrast, if renal function does not allow then just get oral contrast  -    Problem 2-Anemia possible rectal bleeding for a month, melena?  rectal exam-brown stool in vault  ddx hemorrhoids, r/o malignancy,   Rec  -to consider EGD/Colonoscopy after CT scan done and electrolytes are optimized sodium is 125 currently need it at least above 130 unless patient actively bleeding  -start Anusol cream  -Maintain Hemodynamic Stability   -Monitor CBC  -CMP,Optimize Electrolytes  -Transfuse prn to hgb >8  -Two large bore IV lines  - PPI BID  -Monitor Vital Signs  -Monitor Stool For blood, frequency, consistency, melena  -Active Type and Screen   89yFemale large B cell lymphoma on immunotherapy, JASBIR, ITP presents for weakness. GI consulted for anemia and possible GI bleed. Patient reports possible rectal bleeding on and off for a month as well as hematuria. Patient reports colonoscopy a long time ago and normal.    Problem 1-Abdominal pain and distention  Rec  -appreciate x-ray please obtain CT scan abdomen pelvis IV and PO contrast, if renal function does not allow then just get oral contrast    Problem 2-Anemia possible rectal bleeding for a month, melena?  rectal exam-brown stool in vault  ddx hemorrhoids, r/o malignancy,   h and h stable   Rec  -to consider EGD/Colonoscopy after CT scan done and electrolytes are optimized sodium is 125 currently need it at least above 130 unless patient actively bleeding  -start Anusol cream  -Maintain Hemodynamic Stability   -Monitor CBC  -CMP,Optimize Electrolytes  -Transfuse prn to hgb >8  -Two large bore IV lines  - PPI BID  -Monitor Vital Signs  -Monitor Stool For blood, frequency, consistency, melena  -Active Type and Screen

## 2021-09-01 NOTE — PROGRESS NOTE ADULT - SUBJECTIVE AND OBJECTIVE BOX
Patient seen and examined at bedside; she reports she had blood in the stool again overnight. Reports she doesnt feel good. Reports she is constipated and hasnt had a BM in 1 week; reports of abdominal discomfort. Per daughter, patient had 2 normal BM on Sunday.      Constitutional: No fever, fatigue or weight loss.  Skin: No rash.  Eyes: No recent vision problems or eye pain.  ENT: No congestion, ear pain, or sore throat.  Endocrine: No thyroid problems.  Cardiovascular: No chest pain or palpation.  Respiratory: No cough,  congestion, or wheezing.  Gastrointestinal:+abdominal pain, no nausea, vomiting, or diarrhea.; +constipation, +blood in stool  Genitourinary: No dysuria.  Musculoskeletal: No joint swelling.  Neurologic: No headache.      Vital Signs Last 24 Hrs  T(C): 36.8 (01 Sep 2021 13:13), Max: 36.8 (01 Sep 2021 13:13)  T(F): 98.2 (01 Sep 2021 13:13), Max: 98.2 (01 Sep 2021 13:13)  HR: 88 (01 Sep 2021 13:13) (63 - 110)  BP: 127/71 (01 Sep 2021 13:13) (109/65 - 156/68)  BP(mean): --  RR: 18 (01 Sep 2021 13:13) (16 - 18)  SpO2: 98% (01 Sep 2021 13:13) (98% - 98%)  PHYSICAL EXAM-  GENERAL: NAD,   HEAD:  Atraumatic, Normocephalic, obese  EYES: EOMI, PERRLA, conjunctiva and sclera clear  NECK: Supple, No JVD, Normal thyroid  NERVOUS SYSTEM:  Alert  moving all extremities  CHEST/LUNG:  Min crackles with good air entry  HEART: Regular rate and rhythm; No murmurs, rubs, or gallops  ABDOMEN: Soft, mild tenderness in left abd, Nondistended; no rebound tenderness Bowel sounds present  EXTREMITIES:    No clubbing, cyanosis, or edema  SKIN: No rashes or lesions                            10.0   8.61  )-----------( 92       ( 01 Sep 2021 06:55 )             31.0   09-01    125<L>  |  90<L>  |  37<H>  ----------------------------<  144<H>  4.7   |  22  |  1.8<H>    Ca    9.2      01 Sep 2021 06:55    TPro  5.0<L>  /  Alb  3.0<L>  /  TBili  0.6  /  DBili  x   /  AST  18  /  ALT  17  /  AlkPhos  68  08-31            MEDICATIONS  (STANDING):  allopurinol 300 milliGRAM(s) Oral daily  betamethasone valerate 0.1% Cream 1 Application(s) Topical two times a day  cholecalciferol 1000 Unit(s) Oral daily  ferrous    sulfate 325 milliGRAM(s) Oral two times a day  folic acid 1 milliGRAM(s) Oral daily  latanoprost 0.005% Ophthalmic Solution 1 Drop(s) Both EYES at bedtime  Mitocore Daily Supplement 1 Capsule(s) 1 Capsule(s) Oral daily  pantoprazole  Injectable 40 milliGRAM(s) IV Push two times a day  polyethylene glycol 3350 17 Gram(s) Oral daily  predniSONE   Tablet 2.5 milliGRAM(s) Oral daily  senna 2 Tablet(s) Oral at bedtime  sodium chloride 0.9%. 1000 milliLiter(s) (60 mL/Hr) IV Continuous <Continuous>    MEDICATIONS  (PRN):  acetaminophen   Tablet .. 650 milliGRAM(s) Oral every 6 hours PRN Mild Pain (1 - 3)  albuterol/ipratropium for Nebulization 3 milliLiter(s) Nebulizer every 6 hours PRN Shortness of Breath and/or Wheezing  aluminum hydroxide/magnesium hydroxide/simethicone Suspension 30 milliLiter(s) Oral every 4 hours PRN Dyspepsia  diphenhydrAMINE 25 milliGRAM(s) Oral every 8 hours PRN Rash and/or Itching  diphenhydrAMINE 50 milliGRAM(s) Oral once PRN Rash and/or Itching  ondansetron Injectable 4 milliGRAM(s) IV Push every 6 hours PRN Nausea and/or Vomiting       Imaging Personally Reviewed:     [x ] YES  [ ] NO    Consultant(s) Notes Reviewed:  [x ] YES  [ ] NO    Care Discussed with Consultants/Other Providers [x ] YES  [ ] No medical contraindication for discharge

## 2021-09-01 NOTE — PHYSICAL THERAPY INITIAL EVALUATION ADULT - GAIT DEVIATIONS NOTED, PT EVAL
stooped posture, dec heel strike/pushoff ,dec CLAUDIA/decreased paresh/decreased step length/decreased weight-shifting ability

## 2021-09-01 NOTE — PROGRESS NOTE ADULT - SUBJECTIVE AND OBJECTIVE BOX
89yFemale  Being followed for abdominal pain, rectal bleeding  Interval history: Patient denies nausea, vomiting, hematemesis, melena, blood in stool, diarrhea, constipation Patient passing flatus. Patient reports abdominal pain improved.      PAST MEDICAL & SURGICAL HISTORY:  Diffuse large B cell lymphoma    Overactive bladder    History of ITP    S/P total knee arthroplasty  bilateral            Social History: No smoking. No alcohol. No illegal drug use.            MEDICATIONS  (STANDING):  allopurinol 300 milliGRAM(s) Oral daily  betamethasone valerate 0.1% Cream 1 Application(s) Topical two times a day  cholecalciferol 1000 Unit(s) Oral daily  ferrous    sulfate 325 milliGRAM(s) Oral two times a day  folic acid 1 milliGRAM(s) Oral daily  latanoprost 0.005% Ophthalmic Solution 1 Drop(s) Both EYES at bedtime  Mitocore Daily Supplement 1 Capsule(s) 1 Capsule(s) Oral daily  pantoprazole  Injectable 40 milliGRAM(s) IV Push two times a day  polyethylene glycol 3350 17 Gram(s) Oral daily  predniSONE   Tablet 2.5 milliGRAM(s) Oral daily  senna 2 Tablet(s) Oral at bedtime    MEDICATIONS  (PRN):  acetaminophen   Tablet .. 650 milliGRAM(s) Oral every 6 hours PRN Mild Pain (1 - 3)  albuterol/ipratropium for Nebulization 3 milliLiter(s) Nebulizer every 6 hours PRN Shortness of Breath and/or Wheezing  aluminum hydroxide/magnesium hydroxide/simethicone Suspension 30 milliLiter(s) Oral every 4 hours PRN Dyspepsia  diphenhydrAMINE 25 milliGRAM(s) Oral every 8 hours PRN Rash and/or Itching  ondansetron Injectable 4 milliGRAM(s) IV Push every 6 hours PRN Nausea and/or Vomiting      Allergies:  iodine (Unknown)  penicillins (Unknown)  Vicodin (Unknown)          REVIEW OF SYSTEMS:  General:  No weight loss, fevers, or chills.  Eyes:  No reported pain or visual changes  ENT:  No sore throat or runny nose.  NECK: No stiffness   CV:  No chest pain or palpitations.  Resp:  No shortness of breath, cough  GI:  No abdominal pain, nausea, vomiting, dysphagia, diarrhea or constipation. No rectal bleeding, melena, or hematemesis.  Neuro:  No tingling, numbness           VITAL SIGNS:   T(F): 97.6 (09-01-21 @ 05:43), Max: 98.3 (08-31-21 @ 13:40)  HR: 91 (09-01-21 @ 05:43) (63 - 110)  BP: 109/65 (09-01-21 @ 05:43) (109/65 - 156/68)  RR: 16 (09-01-21 @ 05:43) (16 - 18)  SpO2: 98% (08-31-21 @ 20:25) (98% - 98%)    PHYSICAL EXAM:  GENERAL: AAOx3, no acute distress.  HEAD:  Atraumatic, Normocephalic  EYES: conjunctiva and sclera clear  NECK: Supple, no JVD or thyromegaly  CHEST/LUNG: Clear to auscultation bilaterally; No wheeze, rhonchi, or rales  HEART: Regular rate and rhythm; normal S1, S2, No murmurs.  ABDOMEN: Soft, nontender, nondistended; Bowel sounds present  NEUROLOGY: No asterixis or tremor.   SKIN: Intact, no jaundice            LABS:                        10.0   8.61  )-----------( 92       ( 01 Sep 2021 06:55 )             31.0     09-01    125<L>  |  90<L>  |  37<H>  ----------------------------<  144<H>  4.7   |  22  |  1.8<H>    Ca    9.2      01 Sep 2021 06:55    TPro  5.0<L>  /  Alb  3.0<L>  /  TBili  0.6  /  DBili  x   /  AST  18  /  ALT  17  /  AlkPhos  68  08-31    LIVER FUNCTIONS - ( 31 Aug 2021 06:57 )  Alb: 3.0 g/dL / Pro: 5.0 g/dL / ALK PHOS: 68 U/L / ALT: 17 U/L / AST: 18 U/L / GGT: x               IMAGING:    < from: Xray Abdomen 2 Views (08.31.21 @ 12:58) >    EXAM:  XR ABDOMEN 2V            PROCEDURE DATE:  08/31/2021            INTERPRETATION:  Abdomen 2 views    INDICATION: Abdominal distention not passing flatus.    COMPARISON: CT of the abdomen and pelvis on 6/8/2017.    FINDINGS: No free air. Prominent small bowel loops. There is gas seen in the region of the splenic flexure. Findings could represent a developing small bowel obstruction. Degenerative changes of the spine and bilateral hip joints.    IMPRESSION:    Findings could represent a developing small bowel obstruction versus ileus. Recommend continued follow-up.    --- End of Report ---              ASHLEY DSOUZA MD; Attending Radiologist  This document has been electronically signed. Aug 31 2021  2:27PM    < end of copied text >         89yFemale  Being followed for abdominal pain, rectal bleeding  Interval history: Patient denies nausea, vomiting, hematemesis, melena,  diarrhea, constipation Patient passing flatus. Patient reports abdominal pain improved. +rectal bleeding today one episode about half a cup.      PAST MEDICAL & SURGICAL HISTORY:  Diffuse large B cell lymphoma    Overactive bladder    History of ITP    S/P total knee arthroplasty  bilateral            Social History: No smoking. No alcohol. No illegal drug use.            MEDICATIONS  (STANDING):  allopurinol 300 milliGRAM(s) Oral daily  betamethasone valerate 0.1% Cream 1 Application(s) Topical two times a day  cholecalciferol 1000 Unit(s) Oral daily  ferrous    sulfate 325 milliGRAM(s) Oral two times a day  folic acid 1 milliGRAM(s) Oral daily  latanoprost 0.005% Ophthalmic Solution 1 Drop(s) Both EYES at bedtime  Mitocore Daily Supplement 1 Capsule(s) 1 Capsule(s) Oral daily  pantoprazole  Injectable 40 milliGRAM(s) IV Push two times a day  polyethylene glycol 3350 17 Gram(s) Oral daily  predniSONE   Tablet 2.5 milliGRAM(s) Oral daily  senna 2 Tablet(s) Oral at bedtime    MEDICATIONS  (PRN):  acetaminophen   Tablet .. 650 milliGRAM(s) Oral every 6 hours PRN Mild Pain (1 - 3)  albuterol/ipratropium for Nebulization 3 milliLiter(s) Nebulizer every 6 hours PRN Shortness of Breath and/or Wheezing  aluminum hydroxide/magnesium hydroxide/simethicone Suspension 30 milliLiter(s) Oral every 4 hours PRN Dyspepsia  diphenhydrAMINE 25 milliGRAM(s) Oral every 8 hours PRN Rash and/or Itching  ondansetron Injectable 4 milliGRAM(s) IV Push every 6 hours PRN Nausea and/or Vomiting      Allergies:  iodine (Unknown)  penicillins (Unknown)  Vicodin (Unknown)          REVIEW OF SYSTEMS:  General:  No weight loss, fevers, or chills.  Eyes:  No reported pain or visual changes  ENT:  No sore throat or runny nose.  NECK: No stiffness   CV:  No chest pain or palpitations.  Resp:  No shortness of breath, cough  GI:  No abdominal pain, nausea, vomiting, dysphagia, diarrhea or constipation. + rectal bleeding, No melena, or hematemesis.  Neuro:  No tingling, numbness           VITAL SIGNS:   T(F): 97.6 (09-01-21 @ 05:43), Max: 98.3 (08-31-21 @ 13:40)  HR: 91 (09-01-21 @ 05:43) (63 - 110)  BP: 109/65 (09-01-21 @ 05:43) (109/65 - 156/68)  RR: 16 (09-01-21 @ 05:43) (16 - 18)  SpO2: 98% (08-31-21 @ 20:25) (98% - 98%)    PHYSICAL EXAM:  GENERAL: AAOx3, no acute distress.  HEAD:  Atraumatic, Normocephalic  EYES: conjunctiva and sclera clear  NECK: Supple, no JVD or thyromegaly  CHEST/LUNG: Clear to auscultation bilaterally; No wheeze, rhonchi, or rales  HEART: Regular rate and rhythm; normal S1, S2, No murmurs.  ABDOMEN: Soft, nontender, nondistended; Bowel sounds present  NEUROLOGY: No asterixis or tremor.   SKIN: Intact, no jaundice            LABS:                        10.0   8.61  )-----------( 92       ( 01 Sep 2021 06:55 )             31.0     09-01    125<L>  |  90<L>  |  37<H>  ----------------------------<  144<H>  4.7   |  22  |  1.8<H>    Ca    9.2      01 Sep 2021 06:55    TPro  5.0<L>  /  Alb  3.0<L>  /  TBili  0.6  /  DBili  x   /  AST  18  /  ALT  17  /  AlkPhos  68  08-31    LIVER FUNCTIONS - ( 31 Aug 2021 06:57 )  Alb: 3.0 g/dL / Pro: 5.0 g/dL / ALK PHOS: 68 U/L / ALT: 17 U/L / AST: 18 U/L / GGT: x               IMAGING:    < from: Xray Abdomen 2 Views (08.31.21 @ 12:58) >    EXAM:  XR ABDOMEN 2V            PROCEDURE DATE:  08/31/2021            INTERPRETATION:  Abdomen 2 views    INDICATION: Abdominal distention not passing flatus.    COMPARISON: CT of the abdomen and pelvis on 6/8/2017.    FINDINGS: No free air. Prominent small bowel loops. There is gas seen in the region of the splenic flexure. Findings could represent a developing small bowel obstruction. Degenerative changes of the spine and bilateral hip joints.    IMPRESSION:    Findings could represent a developing small bowel obstruction versus ileus. Recommend continued follow-up.    --- End of Report ---              ASHLEY DSOUZA MD; Attending Radiologist  This document has been electronically signed. Aug 31 2021  2:27PM    < end of copied text >         89yFemale  Being followed for abdominal pain, rectal bleeding  Interval history: Patient denies nausea, vomiting, hematemesis, melena,  diarrhea, constipation Patient passing flatus. Patient reports abdominal pain improved. +rectal bleeding today one episode about half a cup in toilet bowel.       PAST MEDICAL & SURGICAL HISTORY:  Diffuse large B cell lymphoma    Overactive bladder    History of ITP    S/P total knee arthroplasty  bilateral            Social History: No smoking. No alcohol. No illegal drug use.            MEDICATIONS  (STANDING):  allopurinol 300 milliGRAM(s) Oral daily  betamethasone valerate 0.1% Cream 1 Application(s) Topical two times a day  cholecalciferol 1000 Unit(s) Oral daily  ferrous    sulfate 325 milliGRAM(s) Oral two times a day  folic acid 1 milliGRAM(s) Oral daily  latanoprost 0.005% Ophthalmic Solution 1 Drop(s) Both EYES at bedtime  Mitocore Daily Supplement 1 Capsule(s) 1 Capsule(s) Oral daily  pantoprazole  Injectable 40 milliGRAM(s) IV Push two times a day  polyethylene glycol 3350 17 Gram(s) Oral daily  predniSONE   Tablet 2.5 milliGRAM(s) Oral daily  senna 2 Tablet(s) Oral at bedtime    MEDICATIONS  (PRN):  acetaminophen   Tablet .. 650 milliGRAM(s) Oral every 6 hours PRN Mild Pain (1 - 3)  albuterol/ipratropium for Nebulization 3 milliLiter(s) Nebulizer every 6 hours PRN Shortness of Breath and/or Wheezing  aluminum hydroxide/magnesium hydroxide/simethicone Suspension 30 milliLiter(s) Oral every 4 hours PRN Dyspepsia  diphenhydrAMINE 25 milliGRAM(s) Oral every 8 hours PRN Rash and/or Itching  ondansetron Injectable 4 milliGRAM(s) IV Push every 6 hours PRN Nausea and/or Vomiting      Allergies:  iodine (Unknown)  penicillins (Unknown)  Vicodin (Unknown)          REVIEW OF SYSTEMS:  General:  No weight loss, fevers, or chills.  Eyes:  No reported pain or visual changes  ENT:  No sore throat or runny nose.  NECK: No stiffness   CV:  No chest pain or palpitations.  Resp:  No shortness of breath, cough  GI:  No abdominal pain, nausea, vomiting, dysphagia, diarrhea or constipation. + rectal bleeding, No melena, or hematemesis.  Neuro:  No tingling, numbness           VITAL SIGNS:   T(F): 97.6 (09-01-21 @ 05:43), Max: 98.3 (08-31-21 @ 13:40)  HR: 91 (09-01-21 @ 05:43) (63 - 110)  BP: 109/65 (09-01-21 @ 05:43) (109/65 - 156/68)  RR: 16 (09-01-21 @ 05:43) (16 - 18)  SpO2: 98% (08-31-21 @ 20:25) (98% - 98%)    PHYSICAL EXAM:  GENERAL: AAOx3, no acute distress.  HEAD:  Atraumatic, Normocephalic  EYES: conjunctiva and sclera clear  NECK: Supple, no JVD or thyromegaly  CHEST/LUNG: Clear to auscultation bilaterally; No wheeze, rhonchi, or rales  HEART: Regular rate and rhythm; normal S1, S2, No murmurs.  ABDOMEN: Soft, nontender, +distended; Bowel sounds present  NEUROLOGY: No asterixis or tremor.   SKIN: Intact, no jaundice            LABS:                        10.0   8.61  )-----------( 92       ( 01 Sep 2021 06:55 )             31.0     09-01    125<L>  |  90<L>  |  37<H>  ----------------------------<  144<H>  4.7   |  22  |  1.8<H>    Ca    9.2      01 Sep 2021 06:55    TPro  5.0<L>  /  Alb  3.0<L>  /  TBili  0.6  /  DBili  x   /  AST  18  /  ALT  17  /  AlkPhos  68  08-31    LIVER FUNCTIONS - ( 31 Aug 2021 06:57 )  Alb: 3.0 g/dL / Pro: 5.0 g/dL / ALK PHOS: 68 U/L / ALT: 17 U/L / AST: 18 U/L / GGT: x               IMAGING:    < from: Xray Abdomen 2 Views (08.31.21 @ 12:58) >    EXAM:  XR ABDOMEN 2V            PROCEDURE DATE:  08/31/2021            INTERPRETATION:  Abdomen 2 views    INDICATION: Abdominal distention not passing flatus.    COMPARISON: CT of the abdomen and pelvis on 6/8/2017.    FINDINGS: No free air. Prominent small bowel loops. There is gas seen in the region of the splenic flexure. Findings could represent a developing small bowel obstruction. Degenerative changes of the spine and bilateral hip joints.    IMPRESSION:    Findings could represent a developing small bowel obstruction versus ileus. Recommend continued follow-up.    --- End of Report ---              ASHLEY DSOUZA MD; Attending Radiologist  This document has been electronically signed. Aug 31 2021  2:27PM    < end of copied text >

## 2021-09-01 NOTE — PROVIDER CONTACT NOTE (OTHER) - REASON
pt confused  climbed out of bed and  non compliant with staff assisted back to bed but attempting to climb oob

## 2021-09-02 NOTE — PROGRESS NOTE ADULT - ATTENDING COMMENTS
If patient continues to have rectal bleeding can consider in-patient colonoscopy.
No further bleeding. Hb stable. Defer Endoscopy.
I edited the note

## 2021-09-02 NOTE — PROCEDURE NOTE - ADDITIONAL PROCEDURE DETAILS
no adherence to aseptic technique due to emergency situation, and Pt having measurable BP, hemodynamically unstable, and no IV access for ACLS medications.

## 2021-09-02 NOTE — PROGRESS NOTE ADULT - SUBJECTIVE AND OBJECTIVE BOX
89yFemale  Being followed for rectal bleeding  Interval history: Patient denies nausea, vomiting, hematemesis, melena, blood in stool, diarrhea, constipation, abdominal pain.      PAST MEDICAL & SURGICAL HISTORY:   Diffuse large B cell lymphoma    Overactive bladder    History of ITP    S/P total knee arthroplasty  bilateral            Social History: No smoking. No alcohol. No illegal drug use.            MEDICATIONS  (STANDING):  allopurinol 300 milliGRAM(s) Oral daily  betamethasone valerate 0.1% Cream 1 Application(s) Topical two times a day  cefTRIAXone   IVPB 1000 milliGRAM(s) IV Intermittent every 24 hours  cholecalciferol 1000 Unit(s) Oral daily  ferrous    sulfate 325 milliGRAM(s) Oral two times a day  folic acid 1 milliGRAM(s) Oral daily  hydrocortisone hemorrhoidal Suppository 1 Suppository(s) Rectal daily  latanoprost 0.005% Ophthalmic Solution 1 Drop(s) Both EYES at bedtime  Mitocore Daily Supplement 1 Capsule(s) 1 Capsule(s) Oral daily  pantoprazole  Injectable 40 milliGRAM(s) IV Push two times a day  polyethylene glycol 3350 17 Gram(s) Oral daily  predniSONE   Tablet 2.5 milliGRAM(s) Oral daily  senna 2 Tablet(s) Oral at bedtime  sodium chloride 1 Gram(s) Oral three times a day    MEDICATIONS  (PRN):  acetaminophen   Tablet .. 650 milliGRAM(s) Oral every 6 hours PRN Mild Pain (1 - 3)  albuterol/ipratropium for Nebulization 3 milliLiter(s) Nebulizer every 6 hours PRN Shortness of Breath and/or Wheezing  aluminum hydroxide/magnesium hydroxide/simethicone Suspension 30 milliLiter(s) Oral every 4 hours PRN Dyspepsia  diphenhydrAMINE 25 milliGRAM(s) Oral every 8 hours PRN Rash and/or Itching  diphenhydrAMINE 50 milliGRAM(s) Oral once PRN Rash and/or Itching  ondansetron Injectable 4 milliGRAM(s) IV Push every 6 hours PRN Nausea and/or Vomiting      Allergies:   iodine (Unknown)  penicillins (Unknown)  Vicodin (Unknown)            REVIEW OF SYSTEMS:  General:  No weight loss, fevers, or chills.  Eyes:  No reported pain or visual changes  ENT:  No sore throat or runny nose.  NECK: No stiffness   CV:  No chest pain or palpitations.  Resp:  No shortness of breath, cough  GI:  No abdominal pain, nausea, vomiting, dysphagia, diarrhea or constipation. No rectal bleeding, melena, or hematemesis.  Muscle:  No aches or weakness  Neuro:  No tingling, numbness           VITAL SIGNS:   T(F): 96.5 (09-02-21 @ 05:00), Max: 96.5 (09-02-21 @ 05:00)  HR: 77 (09-02-21 @ 05:00) (76 - 77)  BP: 105/54 (09-02-21 @ 05:00) (80/49 - 105/54)  RR: 16 (09-02-21 @ 05:00) (16 - 16)  SpO2: --    PHYSICAL EXAM:  GENERAL: AAOx2 to person and place not time, no acute distress.  HEAD:  Atraumatic, Normocephalic  EYES: conjunctiva and sclera clear  NECK: Supple, no JVD or thyromegaly  CHEST/LUNG: Clear to auscultation bilaterally; No wheeze, rhonchi, or rales  HEART: Regular rate and rhythm; normal S1, S2, No murmurs.  ABDOMEN: Soft, nontender, +slightly distended; Bowel sounds present  NEUROLOGY: No asterixis or tremor.   SKIN: Intact, no jaundice            LABS:                        9.5    8.67  )-----------( 103      ( 02 Sep 2021 06:21 )             29.5     09-02    125<L>  |  87<L>  |  53<H>  ----------------------------<  112<H>  5.1<H>   |  22  |  2.7<H>    Ca    9.0      02 Sep 2021 06:21    TPro  4.8<L>  /  Alb  2.9<L>  /  TBili  0.7  /  DBili  x   /  AST  28  /  ALT  26  /  AlkPhos  68  09-02    LIVER FUNCTIONS - ( 02 Sep 2021 06:21 )  Alb: 2.9 g/dL / Pro: 4.8 g/dL / ALK PHOS: 68 U/L / ALT: 26 U/L / AST: 28 U/L / GGT: x               IMAGING:    < from: CT Abdomen and Pelvis w/ Oral Cont (09.01.21 @ 17:58) >    EXAM:  CT ABDOMEN AND PELVIS OC            PROCEDURE DATE:  09/01/2021            INTERPRETATION:  CLINICAL STATEMENT: Small bowel obstruction    TECHNIQUE: Contiguous axial CT images were obtained from the lower chest to the pubic symphysis without intravenous contrast.  Oral contrast was administered.  Reformatted images in the coronal and sagittal planes were acquired.    COMPARISON CT: 6/6/2017    OTHER STUDIES USED FOR CORRELATION: Abdominal radiograph from earlier the same day.      FINDINGS: Limited evaluation secondary to lack of intravenous contrast.    LOWER CHEST: Small bilateral pleural effusions with subsegmental atelectasis at the lung bases.    HEPATOBILIARY: Ill-defined right hepatic lobe hypodensity measuring about 1.4 cm on series 4 image 79, new since prior. Postsurgical clips seen along the left hepatic lobe. Status post cholecystectomy.    SPLEEN: Interval decrease in size of the spleen now measuring 12.6 cm in craniocaudal dimension, previously 16.8 and 6/6/2017.    PANCREAS: Fatty atrophy as on prior.    ADRENAL GLANDS: Unremarkable.    KIDNEYS: 9 mm posterior right upper pole hemorrhagic cyst. A few other bilateral hypodensities probably represent several cysts. No stones or hydronephrosis.    ABDOMINOPELVIC NODES: Multiple prominent periportal and gastrohepatic lymph nodes previously measuring up to 1.6 cm in short axis now measure up to 1.1 cm (series 4 which 101)..    PELVIC ORGANS: Status post hysterectomy.    PERITONEUM/MESENTERY/BOWEL: Trace pelvic free fluid.Status post right hemicolectomy. Mild dilatation of the small bowel up to 3.2 cm. No discrete small bowel transition point visualized. Prominent gas-filled loops of the transverse colon. Moderate hiatal hernia. No free air.    BONES/SOFT TISSUES: Multilevel degenerative changes of the spine. No suspicious osseous lesions visualized.        IMPRESSION:    Findings suggestive of ileus.    No acute inflammatory process seen in the abdomen and pelvis.    Ill-defined right hepatic lobe hypodensity measuring about 1.4 cm on series 4 image 79, new since prior. Cannot exclude metastatic disease.    Interval decrease in size of the spleen now measuring 12.6 cm in craniocaudal dimension, previously 16.8 and 6/6/2017.  Multiple prominent periportal and gastrohepatic lymph nodes previously measuring up to 1.6 cm in short axis now measure up to 1.1 cm (series 4 which 101).    Trace pelvic free fluid.      --- End of Report ---              ASHLEY DSOUZA MD; Attending Radiologist  This document has been electronically signed. Sep  2 2021  9:51AM    < end of copied text >

## 2021-09-02 NOTE — PROGRESS NOTE ADULT - SUBJECTIVE AND OBJECTIVE BOX
Patient is seen and examined at the bed side, is afebrile.      REVIEW OF SYSTEMS: All other review systems are negative        ALLERGIES: iodine (Unknown), penicillins (Unknown)  Vicodin (Unknown)        Vital Signs Last 24 Hrs  T(C): 35.8 (02 Sep 2021 05:00), Max: 35.8 (02 Sep 2021 05:00)  T(F): 96.5 (02 Sep 2021 05:00), Max: 96.5 (02 Sep 2021 05:00)  HR: 77 (02 Sep 2021 05:00) (76 - 77)  BP: 105/54 (02 Sep 2021 05:00) (80/49 - 105/54)  BP(mean): --  RR: 16 (02 Sep 2021 05:00) (16 - 16)  SpO2: --      PHYSICAL EXAM:  GENERAL: Not in distress   CHEST/LUNG:  Not using accessory muscles  HEART: s1 and s2 present  ABDOMEN:  Nontender and  Nondistended  EXTREMITIES: No pedal  edema  CNS: Awake and Alert        LABS:                        9.5    8.67  )-----------( 103      ( 02 Sep 2021 06:21 )             29.5       09-02    123<L>  |  88<L>  |  58<H>  ----------------------------<  114<H>  4.8   |  21  |  2.8<H>    Ca    8.7      02 Sep 2021 15:32    TPro  4.8<L>  /  Alb  2.9<L>  /  TBili  0.7  /  DBili  x   /  AST  28  /  ALT  26  /  AlkPhos  68  09-02        MEDICATIONS  (STANDING):  allopurinol 300 milliGRAM(s) Oral daily  betamethasone valerate 0.1% Cream 1 Application(s) Topical two times a day  cefTRIAXone   IVPB 1000 milliGRAM(s) IV Intermittent every 24 hours  cholecalciferol 1000 Unit(s) Oral daily  ferrous    sulfate 325 milliGRAM(s) Oral two times a day  folic acid 1 milliGRAM(s) Oral daily  hydrocortisone hemorrhoidal Suppository 1 Suppository(s) Rectal daily  latanoprost 0.005% Ophthalmic Solution 1 Drop(s) Both EYES at bedtime  Mitocore Daily Supplement 1 Capsule(s) 1 Capsule(s) Oral daily  pantoprazole  Injectable 40 milliGRAM(s) IV Push two times a day  polyethylene glycol 3350 17 Gram(s) Oral daily  predniSONE   Tablet 2.5 milliGRAM(s) Oral daily  senna 2 Tablet(s) Oral at bedtime  sodium chloride 1 Gram(s) Oral three times a day    MEDICATIONS  (PRN):  acetaminophen   Tablet .. 650 milliGRAM(s) Oral every 6 hours PRN Mild Pain (1 - 3)  albuterol/ipratropium for Nebulization 3 milliLiter(s) Nebulizer every 6 hours PRN Shortness of Breath and/or Wheezing  aluminum hydroxide/magnesium hydroxide/simethicone Suspension 30 milliLiter(s) Oral every 4 hours PRN Dyspepsia  diphenhydrAMINE 25 milliGRAM(s) Oral every 8 hours PRN Rash and/or Itching  diphenhydrAMINE 50 milliGRAM(s) Oral once PRN Rash and/or Itching  ondansetron Injectable 4 milliGRAM(s) IV Push every 6 hours PRN Nausea and/or Vomiting        RADIOLOGY & ADDITIONAL TESTS:    < from: US Renal (09.02.21 @ 11:33) >    No hydronephrosis.    < end of copied text >      < from: CT Abdomen and Pelvis w/ Oral Cont (09.01.21 @ 17:58) >  Findings suggestive of ileus.    No acute inflammatory process seen in the abdomen and pelvis.    Ill-defined right hepatic lobe hypodensity measuring about 1.4 cm on series 4 image 79, new since prior. Cannot exclude metastatic disease.    Interval decrease in size of the spleen now measuring 12.6 cm in craniocaudal dimension, previously 16.8 and 6/6/2017.  Multiple prominent periportal and gastrohepatic lymph nodes previously measuring up to 1.6 cm in short axis now measure up to 1.1 cm (series 4 which 101).    < end of copied text >  < from: Xray Abdomen 2 Views (09.01.21 @ 11:25) >  Increased transverse colonic dilatation. No definite evidence of pneumatosis intestinalis, portal venous gas, or free air.          MICROBIOLOGY DATA:    COVID-19 Jarrod Domain Antibody (08.30.21 @ 06:59)   COVID-19 Jarrod Domain Antibody Result: 34.40    Culture - Urine (08.29.21 @ 03:10)   Specimen Source: Clean Catch Clean Catch (Midstream)   Culture Results: >=3 organisms. Probable collection contamination.     Culture - Blood (08.28.21 @ 23:40)   Specimen Source: .Blood Blood-Peripheral   Culture Results: No growth to date.     Culture - Blood (08.28.21 @ 23:40)   Specimen Source: .Blood Blood-Peripheral   Culture Results: No growth to date.                Patient is seen and examined at the bed side, is afebrile. The events noted, the creatinine is elevated.      REVIEW OF SYSTEMS: All other review systems are negative      ALLERGIES: iodine (Unknown), penicillins (Unknown)  Vicodin (Unknown)      Vital Signs Last 24 Hrs  T(C): 35.8 (02 Sep 2021 05:00), Max: 35.8 (02 Sep 2021 05:00)  T(F): 96.5 (02 Sep 2021 05:00), Max: 96.5 (02 Sep 2021 05:00)  HR: 77 (02 Sep 2021 05:00) (76 - 77)  BP: 105/54 (02 Sep 2021 05:00) (80/49 - 105/54)  BP(mean): --  RR: 16 (02 Sep 2021 05:00) (16 - 16)  SpO2: --      PHYSICAL EXAM:  GENERAL: Not in distress   CHEST/LUNG:  Not using accessory muscles  HEART: s1 and s2 present  ABDOMEN:  distended  EXTREMITIES: No pedal  edema  CNS: Awake and Alert      LABS:                        9.5    8.67  )-----------( 103      ( 02 Sep 2021 06:21 )             29.5       09-02    123<L>  |  88<L>  |  58<H>  ----------------------------<  114<H>  4.8   |  21  |  2.8<H>    Ca    8.7      02 Sep 2021 15:32    TPro  4.8<L>  /  Alb  2.9<L>  /  TBili  0.7  /  DBili  x   /  AST  28  /  ALT  26  /  AlkPhos  68  09-02        MEDICATIONS  (STANDING):  allopurinol 300 milliGRAM(s) Oral daily  betamethasone valerate 0.1% Cream 1 Application(s) Topical two times a day  cefTRIAXone   IVPB 1000 milliGRAM(s) IV Intermittent every 24 hours  cholecalciferol 1000 Unit(s) Oral daily  ferrous    sulfate 325 milliGRAM(s) Oral two times a day  folic acid 1 milliGRAM(s) Oral daily  hydrocortisone hemorrhoidal Suppository 1 Suppository(s) Rectal daily  latanoprost 0.005% Ophthalmic Solution 1 Drop(s) Both EYES at bedtime  Mitocore Daily Supplement 1 Capsule(s) 1 Capsule(s) Oral daily  pantoprazole  Injectable 40 milliGRAM(s) IV Push two times a day  polyethylene glycol 3350 17 Gram(s) Oral daily  predniSONE   Tablet 2.5 milliGRAM(s) Oral daily  senna 2 Tablet(s) Oral at bedtime  sodium chloride 1 Gram(s) Oral three times a day    MEDICATIONS  (PRN):  acetaminophen   Tablet .. 650 milliGRAM(s) Oral every 6 hours PRN Mild Pain (1 - 3)  albuterol/ipratropium for Nebulization 3 milliLiter(s) Nebulizer every 6 hours PRN Shortness of Breath and/or Wheezing  aluminum hydroxide/magnesium hydroxide/simethicone Suspension 30 milliLiter(s) Oral every 4 hours PRN Dyspepsia  diphenhydrAMINE 25 milliGRAM(s) Oral every 8 hours PRN Rash and/or Itching  diphenhydrAMINE 50 milliGRAM(s) Oral once PRN Rash and/or Itching  ondansetron Injectable 4 milliGRAM(s) IV Push every 6 hours PRN Nausea and/or Vomiting        RADIOLOGY & ADDITIONAL TESTS:    < from: US Renal (09.02.21 @ 11:33) >    No hydronephrosis.    < end of copied text >      < from: CT Abdomen and Pelvis w/ Oral Cont (09.01.21 @ 17:58) >  Findings suggestive of ileus.    No acute inflammatory process seen in the abdomen and pelvis.    Ill-defined right hepatic lobe hypodensity measuring about 1.4 cm on series 4 image 79, new since prior. Cannot exclude metastatic disease.    Interval decrease in size of the spleen now measuring 12.6 cm in craniocaudal dimension, previously 16.8 and 6/6/2017.  Multiple prominent periportal and gastrohepatic lymph nodes previously measuring up to 1.6 cm in short axis now measure up to 1.1 cm (series 4 which 101).    < end of copied text >  < from: Xray Abdomen 2 Views (09.01.21 @ 11:25) >  Increased transverse colonic dilatation. No definite evidence of pneumatosis intestinalis, portal venous gas, or free air.          MICROBIOLOGY DATA:    COVID-19 Jarrod Domain Antibody (08.30.21 @ 06:59)   COVID-19 Jarrod Domain Antibody Result: 34.40    Culture - Urine (08.29.21 @ 03:10)   Specimen Source: Clean Catch Clean Catch (Midstream)   Culture Results: >=3 organisms. Probable collection contamination.     Culture - Blood (08.28.21 @ 23:40)   Specimen Source: .Blood Blood-Peripheral   Culture Results: No growth to date.     Culture - Blood (08.28.21 @ 23:40)   Specimen Source: .Blood Blood-Peripheral   Culture Results: No growth to date.

## 2021-09-02 NOTE — PROGRESS NOTE ADULT - SUBJECTIVE AND OBJECTIVE BOX
Patient seen and examined at bedside. Reports she doesn't feel good; reports of abdominal discomfort and it feeling hard. Daughter at bedside this AM; she reports that her mother is confused.      Constitutional: No fever, fatigue or weight loss.  Skin: No rash.  Eyes: No recent vision problems or eye pain.  ENT: No congestion, ear pain, or sore throat.  Endocrine: No thyroid problems.  Cardiovascular: No chest pain or palpation.  Respiratory: No cough,  congestion, or wheezing.  Gastrointestinal: +abdominal pain, no nausea, vomiting, or diarrhea.; +constipation, +blood in stool  Genitourinary: No dysuria.  Musculoskeletal: No joint swelling.  Neurologic: No headache.      Vital Signs Last 24 Hrs  T(C): 35.8 (02 Sep 2021 05:00), Max: 35.8 (02 Sep 2021 05:00)  T(F): 96.5 (02 Sep 2021 05:00), Max: 96.5 (02 Sep 2021 05:00)  HR: 77 (02 Sep 2021 05:00) (76 - 77)  BP: 105/54 (02 Sep 2021 05:00) (80/49 - 105/54)  BP(mean): --  RR: 16 (02 Sep 2021 05:00) (16 - 16)  SpO2: --  PHYSICAL EXAM-  GENERAL: NAD,   HEAD:  Atraumatic, Normocephalic, obese  EYES: EOMI, PERRLA, conjunctiva and sclera clear  NECK: Supple, No JVD, Normal thyroid  NERVOUS SYSTEM:  Alert  moving all extremities  CHEST/LUNG:  Min crackles with good air entry  09-02    125<L>  |  87<L>  |  53<H>  ----------------------------<  112<H>  5.1<H>   |  22  |  2.7<H>    Ca    9.0      02 Sep 2021 06:21    TPro  4.8<L>  /  Alb  2.9<L>  /  TBili  0.7  /  DBili  x   /  AST  28  /  ALT  26  /  AlkPhos  68  09-02  HEART: Regular rate and rhythm; No murmurs, rubs, or gallops  ABDOMEN: Soft, mild tenderness in left abd, Nondistended; no rebound tenderness Bowel sounds present  EXTREMITIES:    No clubbing, cyanosis, or edema  SKIN: No rashes or lesions                            9.5    8.67  )-----------( 103      ( 02 Sep 2021 06:21 )             29.5             MEDICATIONS  (STANDING):  allopurinol 300 milliGRAM(s) Oral daily  betamethasone valerate 0.1% Cream 1 Application(s) Topical two times a day  cholecalciferol 1000 Unit(s) Oral daily  ferrous    sulfate 325 milliGRAM(s) Oral two times a day  folic acid 1 milliGRAM(s) Oral daily  latanoprost 0.005% Ophthalmic Solution 1 Drop(s) Both EYES at bedtime  Mitocore Daily Supplement 1 Capsule(s) 1 Capsule(s) Oral daily  pantoprazole  Injectable 40 milliGRAM(s) IV Push two times a day  polyethylene glycol 3350 17 Gram(s) Oral daily  predniSONE   Tablet 2.5 milliGRAM(s) Oral daily  senna 2 Tablet(s) Oral at bedtime  sodium chloride 0.9%. 1000 milliLiter(s) (60 mL/Hr) IV Continuous <Continuous>    MEDICATIONS  (PRN):  acetaminophen   Tablet .. 650 milliGRAM(s) Oral every 6 hours PRN Mild Pain (1 - 3)  albuterol/ipratropium for Nebulization 3 milliLiter(s) Nebulizer every 6 hours PRN Shortness of Breath and/or Wheezing  aluminum hydroxide/magnesium hydroxide/simethicone Suspension 30 milliLiter(s) Oral every 4 hours PRN Dyspepsia  diphenhydrAMINE 25 milliGRAM(s) Oral every 8 hours PRN Rash and/or Itching  diphenhydrAMINE 50 milliGRAM(s) Oral once PRN Rash and/or Itching  ondansetron Injectable 4 milliGRAM(s) IV Push every 6 hours PRN Nausea and/or Vomiting       Imaging Personally Reviewed:     [x ] YES  [ ] NO    Consultant(s) Notes Reviewed:  [x ] YES  [ ] NO    Care Discussed with Consultants/Other Providers [x ] YES  [ ] No medical contraindication for discharge

## 2021-09-02 NOTE — PROGRESS NOTE ADULT - PROVIDER SPECIALTY LIST ADULT
Gastroenterology
Hospitalist
Infectious Disease
Gastroenterology
Gastroenterology
Hospitalist

## 2021-09-02 NOTE — PROGRESS NOTE ADULT - ASSESSMENT
Patient is 89 yo female with hx of Diffuse large B cell lymphoma, History of ITP, and Overactive bladder presenting with     # UTI  - UC shows multiple organism  - cont Rocephin for now  - f/u repeat Urine culture   - ID to follow up     # Anemia, Unclear Etiology; r/o acute blood loss anemia  # Bright red blood per rectum, possible hemorrhoidal, r/o malignancy, polyp, diverticular  # Xray concerning for ileus vs obstruction  - Blood per stool as per RN  - S/P 2 unit of RbC transfusion  - H/H stable   - CT abd/pelvis: Findings suggestive of ileus. No acute inflammatory process seen in the abdomen and pelvis. Ill-defined right hepatic lobe hypodensity measuring about 1.4 cm on series 4 image 79, new since prior. Cannot exclude metastatic disease. Interval decrease in size of the spleen now measuring 12.6 cm in craniocaudal dimension, previously 16.8 and 6/6/2017.  Multiple prominent periportal and gastrohepatic lymph nodes previously measuring up to 1.6 cm in short axis now measure up to 1.1 cm (series 4 which 101). Trace pelvic free fluid.  PLAN  - Monitor H/H  - GI on board; possibly may need inpatient EGD/Colonoscopy after CT scan done and electrolytes are optimized sodium (> 130)  - will start Anusol cream  - PPI IV   - will start CLD    #Hyponatremia, unclear etiology- worsening  #KATHIA- worsening   - Na was worsening while on NS infusion  - Renal US: wnl  PLAN  - fluid restriction, started NaCl tabs  - f/u repeat BMP  - f/u serum osm, urine osm, urine Na  - f/u Renal consult    #HX of Diffuse large B cell lymphoma  - Hold off on the new medications, might be contributing to rash involving upper extremities  - cont steroid cream    #SOB (improved)  #mild systolic CHF- does not appear to be in decompensated CHF at this time (on my evaluation on 9/1)  - TTE shows  Left ventricular ejection fraction, by visual estimation, is 40 to 45%.  moderate mitral valve regurgitation, mild PH.  The entire apex is hypokinetic  PLAN  - cardiology consult appreciated  - Recommended starting ASA if able to and low dose BB (metoprolol 12.5mg daily) and in future if renal agrees may consider losartan 12.5mg daily  - will need outpatient f/u with cardiology      #Progress Note Handoff  Pending (specify): GI f/u, Renal consult  Family discussion:  plan of care was discussed with patient  and daughter  in details.  Disposition:  unknown

## 2021-09-02 NOTE — PROCEDURE NOTE - NSPROCSEDATIONNOT_GEN_ALL_CORE
No

## 2021-09-02 NOTE — CHART NOTE - NSCHARTNOTEFT_GEN_A_CORE
Code blue called as patient lost pulse as she was talking to her nurse. After prolonged resuscitation efforts (difficulty obtaining IV access and intubation due to body habitus), ROSC was achieved. Accepted to ICU Code blue called as patient lost pulse and went into cardiac arrest as she was talking to her nurse. After prolonged resuscitation efforts (difficulty obtaining IV access and intubation due to body habitus), ROSC was achieved. Accepted to ICU. Although patient with multiple comorbidities, cause of this event is unclear.

## 2021-09-02 NOTE — PROGRESS NOTE ADULT - ASSESSMENT
89 yo female whose PMH includes NHL on immunotherapy, JASBIR (but no prior transfusions?), ITP (on prednisone), glaucoma and overactive bladder, is brought to ER due to poor appetite with episodes of vomiting and episode of chills.    IMPRESSION  #Fevers - possible UTI   - BLood Cx 8/28 NG  - Urine Cx 8/29 > 3 organisms    #NHL - reports being on ibrutinib   #ITP on prednisone    would recommend:    1. Please repeat UA and Urine Cx  2. Continue ceftriaxone 1g daily for now  3. Management of Ileus as per Primary team       Attending Attestation:    Spent more than 45 minutes on total encounter, more than 50 % of the visit was spent counseling and/or coordinating care by the Attending physician.     89 yo female whose PMH includes NHL on immunotherapy, JASBIR (but no prior transfusions?), ITP (on prednisone), glaucoma and overactive bladder, is brought to ER due to poor appetite with episodes of vomiting and episode of chills.    IMPRESSION  #Fevers - possible UTI   - BLood Cx 8/28 NG  - Urine Cx 8/29 > 3 organisms    #NHL - reports being on ibrutinib   #ITP on prednisone    would recommend:    1. Please repeat UA and Urine Cx  2. Continue ceftriaxone 1g daily for now  3. Management of Ileus as per Primary team , NPO, IVF  4. Monitor kidney function     Attending Attestation:    Spent more than 35 minutes on total encounter, more than 50 % of the visit was spent counseling and/or coordinating care by the Attending physician.

## 2021-09-03 NOTE — CONSULT NOTE ADULT - SUBJECTIVE AND OBJECTIVE BOX
89 year old woman with non hodgkin's lymphoma s/p cardiac arrest abut 25 min DEBRA was obtained.     PE   unresponsive  chest: cta b/l  cvs: s1s2+, bradycardia  abd: soft, distended  ext: no edema     A/P: cardiac arrest   Pt to be admitted to MICU   - on pressors   - multiple ativan given for bradycardia   - labs drawn  - replete electrolytes       Overall poor prognosis       CCT 35 min

## 2021-09-03 NOTE — DISCHARGE NOTE FOR THE EXPIRED PATIENT - HOSPITAL COURSE
Pt was upgraded from the floor and was in cardiopulmonary arrest and ROSC was achieved. While in ICU Pt hemodynamically unstable and went into cardiopulmonary arrest x2 with ROSC being achieved. Pt received epi x7, atropine x4 and Sodium bicarb x8. At 0034-Was at bedside when Pt became pulseless and was asystole on monitor. ACLS was initiated, and Pt received epi x2 without ROSC. TOD was called at 0040 by Dr Neal at bedside.

## 2021-09-07 NOTE — CDI QUERY NOTE - NSCDIOTHERTXTBX_GEN_ALL_CORE_HH
Clinical Indicators:    8/29 History and Physical:  … Anemia (actually pancytopenia) with low MCV- history of NHL and JASBIR - suspect iron deficiency as cause in this case. 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 States she thought she has hemorrhoids. Now bleeds even on urination … Skin: diffuse ecchymosis and blood blisters on arms … Bleeding may be related to Imbruvica vs local pathology in colon … Hold Imbruvica for now.   Laboratory CBC Results  • 08-28 @ 23:10 Hct-21.6 / Hgb-6.6 / Plat-68 / RBC-2.68 / WBC-4.  • CBC's  08-29 @ 11:43 Hct-31.8 / Hgb-10.1 / Plat-75 / RBC-3.83 / WBC-4.71     Based on your professional judgment and the clinical indicators, please clarify if pancytopenia can be further specified as:  • Pancytopenia associated with Imbruvica   • Pancytopenia unrelated to medication   • Pancytopenia was evaluated and ruled out  • Other (please specify):  • Unable to clinically determine    Thank you,  Mariana Villalobos RN, CCDS  [phone]
Clinical Indicators    H&P: …  Heart Failure: Does this patient have a history of or has been diagnosed with heart failure? Unknown …    8/30 Hospitalist Progress Note: SOB: …-Seems to be secondary to volume overload. -CXR shows pulmonary vascular congestion. -IV Lasix…    8/30 Pulmonary Consult: …element of CHF now resolved…    8/31 Hospitalist Progress Note: …-TTE shows Left ventricular ejection fraction, by visual estimation, is 40 to 45%.  Moderate mitral valve regurgitation, mild PH.  The entire apex is hypokinetic.-cardiology consult. -continue with oral  Lasix …    Laboratory: 8/28 BNP 2907    Based on your professional judgment and the clinical indicators, please clarify if volume overload can be further specified as:     - Volume overload with acute HFrEF  - Volume overload associated with HFrEF exacerbation  - Other (please specify):   - Clinically unable to further specify volume overload      Thank you,  Mariana Villalobos RN, CCDS  [phone]

## 2022-05-16 NOTE — ED ADULT TRIAGE NOTE - WEIGHT IN LBS
SBAR report faxed to 4th floor, verified receipt with Suzy Hill.      Vicki Avitia RN  05/15/22 [phone]

## 2025-05-02 NOTE — ED PROVIDER NOTE - SECONDARY DIAGNOSIS.
Price (Do Not Change): 0.00
Detail Level: Simple
Instructions: This plan will send the code FBSE to the PM system.  DO NOT or CHANGE the price.
Low hemoglobin